# Patient Record
Sex: FEMALE | Race: WHITE | Employment: OTHER | URBAN - METROPOLITAN AREA
[De-identification: names, ages, dates, MRNs, and addresses within clinical notes are randomized per-mention and may not be internally consistent; named-entity substitution may affect disease eponyms.]

---

## 2017-04-21 ENCOUNTER — HOSPITAL ENCOUNTER (OUTPATIENT)
Dept: ENDOSCOPY | Age: 77
Discharge: HOME | End: 2017-04-21
Attending: INTERNAL MEDICINE | Admitting: INTERNAL MEDICINE

## 2017-11-27 ENCOUNTER — HOSPITAL ENCOUNTER (OUTPATIENT)
Dept: OTHER | Age: 77
Discharge: HOME | End: 2017-11-27
Attending: INTERNAL MEDICINE | Admitting: INTERNAL MEDICINE

## 2017-11-27 LAB
ALBUMIN SERPL-MCNC: 4 G/DL (ref 3.4–5)
ALP BLD-CCNC: 68 U/L (ref 40–129)
ALT SERPL-CCNC: 18 U/L (ref 10–40)
ANION GAP SERPL CALCULATED.3IONS-SCNC: 15 MMOL/L (ref 3–16)
AST SERPL-CCNC: 24 U/L (ref 15–37)
BILIRUB SERPL-MCNC: 0.4 MG/DL (ref 0–1)
BILIRUBIN DIRECT: <0.2 MG/DL (ref 0–0.3)
BILIRUBIN, INDIRECT: NORMAL MG/DL (ref 0–1)
BUN BLDV-MCNC: 14 MG/DL (ref 7–20)
CALCIUM SERPL-MCNC: 9.2 MG/DL (ref 8.3–10.6)
CHLORIDE BLD-SCNC: 103 MMOL/L (ref 99–110)
CHOLESTEROL, TOTAL: 212 MG/DL (ref 0–199)
CO2: 27 MMOL/L (ref 21–32)
CREAT SERPL-MCNC: 0.6 MG/DL (ref 0.6–1.2)
GFR AFRICAN AMERICAN: >60
GFR NON-AFRICAN AMERICAN: >60
GLUCOSE BLD-MCNC: 90 MG/DL (ref 70–99)
HCT VFR BLD CALC: 39.7 % (ref 36–48)
HDLC SERPL-MCNC: 51 MG/DL (ref 40–60)
HEMOGLOBIN: 13.4 G/DL (ref 12–16)
LDL CHOLESTEROL CALCULATED: 117 MG/DL
MCH RBC QN AUTO: 32 PG (ref 26–34)
MCHC RBC AUTO-ENTMCNC: 33.7 G/DL (ref 31–36)
MCV RBC AUTO: 94.8 FL (ref 80–100)
PDW BLD-RTO: 12.7 % (ref 12.4–15.4)
PLATELET # BLD: 190 K/UL (ref 135–450)
PMV BLD AUTO: 8.6 FL (ref 5–10.5)
POTASSIUM SERPL-SCNC: 4.1 MMOL/L (ref 3.5–5.1)
RBC # BLD: 4.19 M/UL (ref 4–5.2)
SODIUM BLD-SCNC: 145 MMOL/L (ref 136–145)
TOTAL PROTEIN: 7.1 G/DL (ref 6.4–8.2)
TRIGL SERPL-MCNC: 220 MG/DL (ref 0–150)
TSH SERPL DL<=0.05 MIU/L-ACNC: 3.63 UIU/ML (ref 0.27–4.2)
VLDLC SERPL CALC-MCNC: 44 MG/DL
WBC # BLD: 5.5 K/UL (ref 4–11)

## 2018-11-05 ENCOUNTER — OFFICE VISIT (OUTPATIENT)
Dept: ENDOCRINOLOGY | Age: 78
End: 2018-11-05
Payer: MEDICARE

## 2018-11-05 ENCOUNTER — TELEPHONE (OUTPATIENT)
Dept: ENDOCRINOLOGY | Age: 78
End: 2018-11-05

## 2018-11-05 VITALS
HEART RATE: 72 BPM | WEIGHT: 133 LBS | DIASTOLIC BLOOD PRESSURE: 82 MMHG | SYSTOLIC BLOOD PRESSURE: 130 MMHG | HEIGHT: 62 IN | BODY MASS INDEX: 24.48 KG/M2 | OXYGEN SATURATION: 98 %

## 2018-11-05 DIAGNOSIS — R79.89 ABNORMAL THYROID BLOOD TEST: ICD-10-CM

## 2018-11-05 DIAGNOSIS — M81.0 AGE-RELATED OSTEOPOROSIS WITHOUT CURRENT PATHOLOGICAL FRACTURE: Primary | ICD-10-CM

## 2018-11-05 DIAGNOSIS — R79.89 ABNORMAL TSH: ICD-10-CM

## 2018-11-05 DIAGNOSIS — E55.9 VITAMIN D DEFICIENCY: ICD-10-CM

## 2018-11-05 PROCEDURE — 99204 OFFICE O/P NEW MOD 45 MIN: CPT | Performed by: INTERNAL MEDICINE

## 2018-11-05 RX ORDER — ESTRADIOL 0.1 MG/G
2 CREAM VAGINAL
COMMUNITY

## 2018-11-05 NOTE — PATIENT INSTRUCTIONS
Patient Education        Preventing Osteoporosis: Care Instructions  Your Care Instructions    Osteoporosis means the bones are weak and thin enough that they can break easily. The older you are, the more likely you are to get osteoporosis. But with plenty of calcium, vitamin D, and exercise, you can help prevent osteoporosis. The preteen and teen years are a key time for bone building. With the help of calcium, vitamin D, and exercise in those early years and beyond, the bones reach their peak density and strength by age 27. After age 27, your bones naturally start to thin and weaken. The stronger your bones are at around age 27, the lower your risk for osteoporosis. But no matter what your age and risk are, your bones still need calcium, vitamin D, and exercise to stay strong. Also avoid smoking, and limit alcohol. Smoking and heavy alcohol use can make your bones thinner. Talk to your doctor about any special risks you might have, such as having a close relative with osteoporosis or taking a medicine that can weaken bones. Your doctor can tell you the best ways to protect your bones from thinning. Follow-up care is a key part of your treatment and safety. Be sure to make and go to all appointments, and call your doctor if you are having problems. It's also a good idea to know your test results and keep a list of the medicines you take. How can you care for yourself at home? · Get enough calcium and vitamin D. The [place] [employer] recommends adults younger than age 46 need 1,000 mg of calcium and 600 IU of vitamin D each day. Women ages 46 to 79 need 1,200 mg of calcium and 600 IU of vitamin D each day. Men ages 46 to 79 need 1,000 mg of calcium and 600 IU of vitamin D each day. Adults 71 and older need 1,200 mg of calcium and 800 IU of vitamin D each day. ¨ Eat foods rich in calcium, like yogurt, cheese, milk, and dark green vegetables.   ¨ Eat foods rich in vitamin D, like eggs, fatty fish, cereal, note the exact time of the final collection, even if it is not the same time as when collection began on day 1. STORAGE -- The bottle(s) may be kept at room temperature for a day or two, but should be kept cool or refrigerated for longer periods of time. WHERE TO GET MORE INFORMATION -- Your healthcare provider is the best source of information for questions and concerns related to your medical problem. Patient Education   Patient Education          denosumab (Prolia)  Pronunciation:  valente CHAVEZ natalya he  Brand:  Prolia  What is the most important information I should know about Prolia? This medication guide provides information about the Prolia brand of denosumab. [facility] is another brand of denosumab used to prevent bone fractures and other skeletal conditions in people with tumors that have spread to the bone. You should not receive denosumab if you have low levels of calcium in your blood (hypocalcemia). Prolia can harm an unborn baby or cause birth defects. Do not use if you are pregnant. What is denosumab (Prolia)? Denosumab is a monoclonal antibody. Monoclonal antibodies are made to target and destroy only certain cells in the body. This may help to protect healthy cells from damage. The Prolia brand of denosumab is used to treat osteoporosis in postmenopausal women who have high risk of bone fracture. Prolia is also used to increase bone mass in women and men with a high risk of bone fracture caused by receiving treatments for certain types of cancer. This medication guide provides information about the Prolia brand of denosumab. [facility] is another brand of denosumab used to prevent bone fractures and other skeletal conditions in people with tumors that have spread to the bone. Denosumab may also be used for purposes not listed in this medication guide. What should I discuss with my health care provider before receiving Prolia?   You should not receive Prolia if you are allergic to denosumab, or if you restrictions on food, beverages, or activity. What are the possible side effects of Prolia? Get emergency medical help if you have signs of an allergic reaction: hives, itching, rash; difficult breathing, feeling light-headed; swelling of your face, lips, tongue, or throat. Call your doctor at once if you have:  · new or unusual pain in your thigh, hip, or groin;  · severe pain in your joints, muscles, or bones;  · skin problems such as dryness, peeling, redness, itching, blisters, bumps, oozing, or crusting; or  · low levels of calcium in your blood (hypocalcemia) --numbness or tingly feeling around your mouth or in your fingers or toes, muscle tightness or contraction, overactive reflexes. Serious infections may occur during treatment with Prolia. Call your doctor right away if you have signs of infection such as:  · fever, chills, night sweats;  · swelling, pain, tenderness, warmth, or redness anywhere on your body;  · pain or burning when you urinate;  · increased or urgent need to urinate;  · severe stomach pain; or  · cough, feeling short of breath. Common side effects may include:  · bladder infection (painful or difficult urination);  · back pain, muscle pain; or  · pain in your arms or legs. This is not a complete list of side effects and others may occur. Call your doctor for medical advice about side effects. You may report side effects to FDA at 5-455-FDA-4706. What other drugs will affect Prolia? Other drugs may interact with denosumab, including prescription and over-the-counter medicines, vitamins, and herbal products. Tell each of your health care providers about all medicines you use now and any medicine you start or stop using. Where can I get more information? Your doctor or pharmacist can provide more information about denosumab (Prolia).     Remember, keep this and all other medicines out of the reach of children, never share your medicines with others, and use this medication only for begin taking alendronate);  · kidney disease; or  · any condition that makes it hard for your body to absorb nutrients from food (malabsorption). The effervescent tablet contains a lot of sodium. Tell your doctor if you are on a low-salt diet before using this form of alendronate. In rare cases, this medicine may cause bone loss (osteonecrosis) in the jaw. Symptoms include jaw pain or numbness, red or swollen gums, loose teeth, or slow healing after dental work. The longer you use alendronate, the more likely you are to develop this condition. Osteonecrosis of the jaw may be more likely if you have cancer or received chemotherapy, radiation, or steroids. Other risk factors include blood clotting disorders, anemia (low red blood cells), and a pre existing dental problem. Talk with your doctor about the risks and benefits of using this medication. It is not known whether this medicine will harm an unborn baby. Tell your doctor if you are pregnant or plan to become pregnant. It is not known whether alendronate passes into breast milk or if it could harm a nursing baby. Tell your doctor if you are breast-feeding a baby. How should I take alendronate? Alendronate is taken either once daily or once per week. Follow all directions on your prescription label. Do not take this medicine in larger or smaller amounts or for longer than recommended. Take alendronate first thing in the morning, at least 30 minutes before you eat or drink anything or take any other medicine. If you take alendronate only once per week, take it on the same day each week and always first thing in the morning. Take with a full glass (6 to 8 ounces) of plain water. Do not use coffee, tea, soda, juice, or mineral water. Do not eat or drink anything other than plain water. Measure liquid medicine  with the dosing syringe provided, or with a special dose-measuring spoon or medicine cup.  If you do not have a dose-measuring device, ask your

## 2018-11-12 ENCOUNTER — HOSPITAL ENCOUNTER (OUTPATIENT)
Age: 78
Discharge: HOME | End: 2018-11-12
Payer: MEDICARE

## 2018-11-12 DIAGNOSIS — M81.0 AGE-RELATED OSTEOPOROSIS WITHOUT CURRENT PATHOLOGICAL FRACTURE: ICD-10-CM

## 2018-11-12 DIAGNOSIS — E55.9 VITAMIN D DEFICIENCY: ICD-10-CM

## 2018-11-12 DIAGNOSIS — R79.89 ABNORMAL TSH: ICD-10-CM

## 2018-11-12 DIAGNOSIS — R79.89 ABNORMAL THYROID BLOOD TEST: ICD-10-CM

## 2018-11-12 LAB
24HR URINE VOLUME (ML): 1650 ML
A/G RATIO: 1.4 (ref 1.1–2.2)
ALBUMIN SERPL-MCNC: 4.3 G/DL (ref 3.4–5)
ALP BLD-CCNC: 71 U/L (ref 40–129)
ALT SERPL-CCNC: 18 U/L (ref 10–40)
ANION GAP SERPL CALCULATED.3IONS-SCNC: 12 MMOL/L (ref 3–16)
AST SERPL-CCNC: 25 U/L (ref 15–37)
BILIRUB SERPL-MCNC: <0.2 MG/DL (ref 0–1)
BUN BLDV-MCNC: 15 MG/DL (ref 7–20)
CALCIUM 24 HOUR URINE: 221 MG/24 HR (ref 42–353)
CALCIUM SERPL-MCNC: 9.7 MG/DL (ref 8.3–10.6)
CHLORIDE BLD-SCNC: 104 MMOL/L (ref 99–110)
CO2: 27 MMOL/L (ref 21–32)
CREAT SERPL-MCNC: 0.6 MG/DL (ref 0.6–1.2)
CREAT SERPL-MCNC: 0.6 MG/DL (ref 0.6–1.2)
CREATININE 24 HOUR URINE: 0.7 G/24HR (ref 0.6–1.5)
CREATININE CLEARANCE: 92 ML/MIN (ref 88–128)
GFR AFRICAN AMERICAN: >60
GFR NON-AFRICAN AMERICAN: >60
GLOBULIN: 3 G/DL
GLUCOSE BLD-MCNC: 97 MG/DL (ref 70–99)
Lab: 24 HR
PARATHYROID HORMONE INTACT: 28.5 PG/ML (ref 14–72)
POTASSIUM SERPL-SCNC: 4.1 MMOL/L (ref 3.5–5.1)
SODIUM BLD-SCNC: 143 MMOL/L (ref 136–145)
TOTAL PROTEIN: 7.3 G/DL (ref 6.4–8.2)
TSH SERPL DL<=0.05 MIU/L-ACNC: 2.31 UIU/ML (ref 0.27–4.2)
VITAMIN D 25-HYDROXY: 51.8 NG/ML

## 2018-11-12 PROCEDURE — 82523 COLLAGEN CROSSLINKS: CPT

## 2018-11-12 PROCEDURE — 83516 IMMUNOASSAY NONANTIBODY: CPT

## 2018-11-12 PROCEDURE — 82306 VITAMIN D 25 HYDROXY: CPT

## 2018-11-12 PROCEDURE — 84443 ASSAY THYROID STIM HORMONE: CPT

## 2018-11-12 PROCEDURE — 80053 COMPREHEN METABOLIC PANEL: CPT

## 2018-11-12 PROCEDURE — 82340 ASSAY OF CALCIUM IN URINE: CPT

## 2018-11-12 PROCEDURE — 82575 CREATININE CLEARANCE TEST: CPT

## 2018-11-12 PROCEDURE — 36415 COLL VENOUS BLD VENIPUNCTURE: CPT

## 2018-11-12 PROCEDURE — 83970 ASSAY OF PARATHORMONE: CPT

## 2018-11-13 ENCOUNTER — HOSPITAL ENCOUNTER (OUTPATIENT)
Age: 78
Discharge: HOME | End: 2018-11-13
Payer: MEDICARE

## 2018-11-13 PROCEDURE — 82523 COLLAGEN CROSSLINKS: CPT

## 2018-11-14 LAB
C TELOPEPTIDE: 168 PG/ML
CELIAC PANEL: 7 UNITS (ref 0–19)

## 2018-11-15 LAB
CREATININE URINE: 58 MG/DL
N-TELOPEPTIDE, CROSS-LINKED, URINE: 45

## 2018-12-26 ENCOUNTER — TELEPHONE (OUTPATIENT)
Dept: ENDOCRINOLOGY | Age: 78
End: 2018-12-26

## 2019-01-24 ENCOUNTER — TELEPHONE (OUTPATIENT)
Dept: ENDOCRINOLOGY | Age: 79
End: 2019-01-24

## 2019-02-05 ENCOUNTER — OFFICE VISIT (OUTPATIENT)
Dept: ENDOCRINOLOGY | Age: 79
End: 2019-02-05
Payer: MEDICARE

## 2019-02-05 VITALS
OXYGEN SATURATION: 99 % | HEART RATE: 78 BPM | HEIGHT: 62 IN | SYSTOLIC BLOOD PRESSURE: 146 MMHG | BODY MASS INDEX: 24.29 KG/M2 | DIASTOLIC BLOOD PRESSURE: 82 MMHG | WEIGHT: 132 LBS

## 2019-02-05 DIAGNOSIS — M81.0 AGE-RELATED OSTEOPOROSIS WITHOUT CURRENT PATHOLOGICAL FRACTURE: Primary | ICD-10-CM

## 2019-02-05 DIAGNOSIS — Z78.0 POSTMENOPAUSAL: ICD-10-CM

## 2019-02-05 PROCEDURE — 99214 OFFICE O/P EST MOD 30 MIN: CPT | Performed by: INTERNAL MEDICINE

## 2019-02-05 RX ORDER — ALENDRONATE SODIUM 70 MG/1
70 TABLET ORAL
Qty: 4 TABLET | Refills: 5 | Status: SHIPPED | OUTPATIENT
Start: 2019-02-05 | End: 2019-04-03 | Stop reason: ALTCHOICE

## 2019-03-19 ENCOUNTER — TELEPHONE (OUTPATIENT)
Dept: FAMILY MEDICINE CLINIC | Age: 79
End: 2019-03-19

## 2019-03-22 ENCOUNTER — TELEPHONE (OUTPATIENT)
Dept: FAMILY MEDICINE CLINIC | Age: 79
End: 2019-03-22

## 2019-04-03 ENCOUNTER — OFFICE VISIT (OUTPATIENT)
Dept: FAMILY MEDICINE CLINIC | Age: 79
End: 2019-04-03
Payer: MEDICARE

## 2019-04-03 VITALS
HEART RATE: 72 BPM | WEIGHT: 130 LBS | BODY MASS INDEX: 23.92 KG/M2 | OXYGEN SATURATION: 98 % | DIASTOLIC BLOOD PRESSURE: 88 MMHG | HEIGHT: 62 IN | SYSTOLIC BLOOD PRESSURE: 142 MMHG | TEMPERATURE: 98.4 F

## 2019-04-03 DIAGNOSIS — M81.0 AGE-RELATED OSTEOPOROSIS WITHOUT CURRENT PATHOLOGICAL FRACTURE: Primary | ICD-10-CM

## 2019-04-03 DIAGNOSIS — H61.22 IMPACTED CERUMEN, LEFT EAR: ICD-10-CM

## 2019-04-03 DIAGNOSIS — R03.0 ELEVATED BP WITHOUT DIAGNOSIS OF HYPERTENSION: ICD-10-CM

## 2019-04-03 PROCEDURE — 99202 OFFICE O/P NEW SF 15 MIN: CPT | Performed by: FAMILY MEDICINE

## 2019-04-03 RX ORDER — TURMERIC ROOT EXTRACT 500 MG
1 TABLET ORAL 2 TIMES DAILY
COMMUNITY
End: 2021-04-23

## 2019-04-03 ASSESSMENT — PATIENT HEALTH QUESTIONNAIRE - PHQ9
1. LITTLE INTEREST OR PLEASURE IN DOING THINGS: 0
SUM OF ALL RESPONSES TO PHQ QUESTIONS 1-9: 0
SUM OF ALL RESPONSES TO PHQ QUESTIONS 1-9: 0
2. FEELING DOWN, DEPRESSED OR HOPELESS: 0
SUM OF ALL RESPONSES TO PHQ9 QUESTIONS 1 & 2: 0

## 2019-04-03 NOTE — PATIENT INSTRUCTIONS
Patient Education        Deciding About Bisphosphonate Medicine for Osteoporosis  How can you decide about taking bisphosphonate medicine for osteoporosis? What is osteoporosis? Osteoporosis is a disease that affects your bones. It means you have bones that are thin and brittle and have lots of holes inside them like a sponge. This makes them easy to break. It also increases your risk for spine and hip fractures. These fractures may make it hard for you to live on your own. Bisphosphonates are the most common medicines used to prevent bone loss. Most of the time, you take them as pills. They slow the way bone dissolves and is absorbed by your body. They can increase bone thickness and strength. What are key points about this decision? · If you have osteoporosis, these medicines can increase bone thickness. And they can lower your risk of spine and hip fractures. You may also want to think about taking them if you have low bone density (sometimes called osteopenia) or risk factors for osteoporosis. · These medicines can have side effects, such as heartburn and belly pain. They also can cause headaches. Their long-term effects are not known. · Whether you take medicine or not, healthy habits can also help protect your bones. Talk to your doctor about taking calcium and vitamin D supplements. Get regular weight-bearing exercise. Cut back on alcohol. And if you smoke, quit. Why might you choose to take bisphosphonate medicines? · You have bone loss that is not normal for your age. · You have osteoporosis or low bone density. Or you have risk factors for osteoporosis. · You have been taking hormone therapy (HT) and stopped. Bone loss medicines can protect against rapid bone loss after you quit HT. · You do not mind taking pills. Or you do not mind getting medicines through a tube in your vein, called an IV. · You think the benefits of taking these medicines outweigh the side effects.   Why might you choose not to take these medicines? · You want to try other medicines that help prevent bone loss. These include calcitonin (Calcimar or Miacalcin), denosumab (Prolia or Xgeva), hormone therapy, raloxifene (Evista), and teriparatide (Forteo). · You want to try healthy habits to prevent bone fractures. · You do not like the idea of taking pills. Or you do not like getting medicines through a tube in your vein, called an IV. · You are worried about the side effects of these medicines. Your decision  Thinking about the facts and your feelings can help you make a decision that is right for you. Be sure you understand the benefits and risks of your options, and think about what else you need to do before you make the decision. Where can you learn more? Go to [email or web address] org and sign in to your LifeBook account. Enter Z205 in the Appear Here box to learn more about \"Deciding About Bisphosphonate Medicine for Osteoporosis. \"     If you do not have an account, please click on the \"Sign Up Now\" link. Current as of: March 15, 2018  Content Version: 11.9  © 8115-3026 [employer]. Care instructions adapted under license by [facility] ([facility]). If you have questions about a medical condition or this instruction, always ask your healthcare professional. [address] any warranty or liability for your use of this information.

## 2019-04-03 NOTE — PROGRESS NOTES
Maya Genao is a 77 y.o. female here to establish care. The patient has had a primary care physician in the recent past, Dr. Harini Leo .  is a patient here  Hx osteoporosis, did not tolerate fosamax, won't take prolia    HPI:  W[facility] to [facility]   comes here    postmenopausal osteoporosis- does not like treatment options, did not tolerate Fosamax and will not take Prolia      ROS:  Gen:  Denies fever, chills, unintentional weight loss. HEENT:  Denies cold symptoms, sore throat. CV:  Denies chest pain or tightness, palpitations. Pulm:  Denies shortness of breath, cough. Abd:  Denies abdominal pain, change in bowel habits, rectal bleeding, nausea and vomiting. I have reviewed the patient's medical history in detail and updated the computerized patient record. OBJECTIVE:  BP (!) 142/88 (Site: Left Upper Arm, Position: Sitting, Cuff Size: Medium Adult)   Pulse 72   Temp 98.4 °F (36.9 °C) (Tympanic)   Ht 5' 1.81\" (1.57 m)   Wt 130 lb (59 kg)   SpO2 98%   BMI 23.92 kg/m²   GEN:  in NAD, thin  white female  HEENT:  NCAT, TMs and THROAT: clear, PERRL, EOMI. NECK:  Supple without adenopathy. CV:  Regular rate and rhythm, S1 and S2 normal, no murmurs, clicks, gallops or rubs. No edema. PULM:  Chest is clear, no wheezing or rales. Normal symmetric air entry throughout both lung fields. ABD: Soft, NTND, normal bowel sounds, no masses. EXT: no rash or edema  NEURO: nonfocal    ASSESSMENT:  1. Age-related osteoporosis without current pathological fracture    2. Elevated BP without diagnosis of hypertension    3.  Impacted cerumen, left ear        PLAN:  Release of records from previous physician  Debrox drops and come back to irrigate left ear  Check  blood pressure at home and report back to us  Consider lower dose of Fosamax or Actonel perhaps she can tolerate that better   Reviewed office policies with the patient   Will review prior records when available

## 2019-04-10 ENCOUNTER — OFFICE VISIT (OUTPATIENT)
Dept: FAMILY MEDICINE CLINIC | Age: 79
End: 2019-04-10
Payer: MEDICARE

## 2019-04-10 VITALS
WEIGHT: 129 LBS | BODY MASS INDEX: 23.74 KG/M2 | DIASTOLIC BLOOD PRESSURE: 70 MMHG | HEART RATE: 75 BPM | OXYGEN SATURATION: 97 % | HEIGHT: 62 IN | SYSTOLIC BLOOD PRESSURE: 144 MMHG

## 2019-04-10 DIAGNOSIS — R03.0 ELEVATED SYSTOLIC BLOOD PRESSURE READING WITHOUT DIAGNOSIS OF HYPERTENSION: Primary | ICD-10-CM

## 2019-04-10 DIAGNOSIS — H61.22 IMPACTED CERUMEN OF LEFT EAR: ICD-10-CM

## 2019-04-10 PROCEDURE — 99213 OFFICE O/P EST LOW 20 MIN: CPT | Performed by: FAMILY MEDICINE

## 2019-04-10 RX ORDER — LOSARTAN POTASSIUM 25 MG/1
25 TABLET ORAL DAILY
Qty: 30 TABLET | Refills: 5 | Status: SHIPPED | OUTPATIENT
Start: 2019-04-10 | End: 2019-12-20

## 2019-04-10 NOTE — PATIENT INSTRUCTIONS
Patient Education        losartan  Pronunciation:  deion KAREN tan  Brand:  Cozaar  What is the most important information I should know about losartan? Do not use if you are pregnant. If you become pregnant, stop taking this medicine and tell your doctor right away. Losartan can cause injury or death to the unborn baby if you take the medicine during your second or third trimester. If you have diabetes, do not use losartan together with any medication that contains aliskiren (Amturnide, Tekturna, Tekamlo). What is losartan? Losartan is an angiotensin II receptor antagonist. Losartan keeps blood vessels from narrowing, which lowers blood pressure and improves blood flow. Losartan is used to treat high blood pressure (hypertension) in adults and children who are at least 10years old. It is also used to lower the risk of stroke in certain people with heart disease. Losartan is used to slow long-term kidney damage in people with type 2 diabetes who also have high blood pressure. Losartan may also be used for purposes not listed in this medication guide. What should I discuss with my healthcare provider before taking losartan? You should not use losartan if you are allergic to it. If you have diabetes, do not use losartan together with any medication that contains aliskiren (A[place]ide, Tekturna, Tekamlo). You may also need to avoid taking losartan with aliskiren if you have kidney disease. Do not use if you are pregnant. If you become pregnant, stop taking this medicine and tell your doctor right away. Losartan can cause injury or death to the unborn baby if you take the medicine during your second or third trimester. To make sure losartan is safe for you, tell your doctor if you have:  · kidney disease;  · liver disease;  · congestive heart failure;  · an electrolyte imbalance (such as high levels of potassium in your blood);  · if you are on a low-salt diet; or  · if you are dehydrated.   It is not known whether losartan passes into breast milk or if it could harm a nursing baby. You should not breast-feed while using this medicine. Losartan is not approved for use by anyone younger than 10years old. How should I take losartan? Follow all directions on your prescription label. Your doctor may occasionally change your dose to make sure you get the best results. Do not take this medicine in larger or smaller amounts or for longer than recommended. You may take losartan with or without food. Call your doctor if you have ongoing vomiting or diarrhea, or if you are sweating more than usual. You can easily become dehydrated while taking this medication, which can lead to severely low blood pressure or a serious electrolyte imbalance. Your blood pressure will need to be checked often. You may need other blood and urine tests at your doctor's office. It may take 3 to 6 weeks of using this medicine before your blood pressure is under control. For best results, keep using the medication as directed. Talk with your doctor if your condition does not improve after 3 weeks of treatment. If you are being treated for high blood pressure, keep using this medication even if you feel well. High blood pressure often has no symptoms. You may need to use blood pressure medication for the rest of your life. Store at room temperature away from moisture, heat, and light. What happens if I miss a dose? Take the missed dose as soon as you remember. Skip the missed dose if it is almost time for your next scheduled dose. Do not  take extra medicine to make up the missed dose. What happens if I overdose? Seek emergency medical attention or call the Poison Help line at 1-103.496.7965. What should I avoid while taking losartan? Drinking alcohol can further lower your blood pressure and may increase certain side effects of losartan.   Do not use potassium supplements or salt substitutes while you are taking losartan, unless your doctor has told you to. Avoid getting up too fast from a sitting or lying position, or you may feel dizzy. Get up slowly and steady yourself to prevent a fall. What are the possible side effects of losartan? Get emergency medical help if you have signs of an allergic reaction: hives; difficult breathing; swelling of your face, lips, tongue, or throat. Call your doctor at once if you have:  · a light-headed feeling, like you might pass out;  · pain or burning when you urinate;  · pale skin, feeling light-headed or short of breath, rapid heart rate, trouble concentrating;  · chest pain;  · high potassium --nausea, slow or unusual heart rate, weakness, loss of movement; or  · kidney problems --little or no urination, painful or difficult urination, swelling in your feet or ankles, feeling tired or short of breath. Common side effects may include:  · dizziness;  · dry cough;  · back pain; or  · cold symptoms such as stuffy nose, sneezing, sore throat. This is not a complete list of side effects and others may occur. Call your doctor for medical advice about side effects. You may report side effects to FDA at 3-734-FDA-5929. What other drugs will affect losartan? Tell your doctor about all your current medicines and any you start or stop using, especially:  · a diuretic or \"water pill\";  · other blood pressure medications;  · lithium;  · celecoxib; or  · aspirin or other NSAIDs (nonsteroidal anti-inflammatory drugs) such as ibuprofen (Advil, Motrin), naproxen (Aleve), celecoxib, diclofenac, indomethacin, meloxicam, and others. This list is not complete. Other drugs may interact with losartan, including prescription and over-the-counter medicines, vitamins, and herbal products. Not all possible interactions are listed in this medication guide. Where can I get more information? Your pharmacist can provide more information about losartan.   Remember, keep this and all other medicines out of the reach of children, never share your medicines with others, and use this medication only for the indication prescribed. Every effort has been made to ensure that the information provided by [address] is accurate, up-to-date, and complete, but no guarantee is made to that effect. Drug information contained herein may be time sensitive. [facility] information has been compiled for use by healthcare practitioners and consumers in the [place] and therefore [facility] does not warrant that uses outside of the [place] are appropriate, unless specifically indicated otherwise. [facility]'s drug information does not endorse drugs, diagnose patients or recommend therapy. [facility]'s drug information is an informational resource designed to assist licensed healthcare practitioners in caring for their patients and/or to serve consumers viewing this service as a supplement to, and not a substitute for, the expertise, skill, knowledge and judgment of healthcare practitioners. The absence of a warning for a given drug or drug combination in no way should be construed to indicate that the drug or drug combination is safe, effective or appropriate for any given patient. [facility] does not assume any responsibility for any aspect of healthcare administered with the aid of information [facility] provides. The information contained herein is not intended to cover all possible uses, directions, precautions, warnings, drug interactions, allergic reactions, or adverse effects. If you have questions about the drugs you are taking, check with your doctor, nurse or pharmacist.  Copyright 2028-3006 [address]. Version: 14.01. Revision date: 5/6/2016. Care instructions adapted under license by [facility] ([facility]). If you have questions about a medical condition or this instruction, always ask your healthcare professional. [place] [address] any warranty or liability for your use of this information.

## 2019-04-10 NOTE — PROGRESS NOTES
Xochitl Ramos is a 77 y.o. female. HPI:  For blood pressure check and left ear lavage/wax  Been using drip Debrox drops  Patient states blood pressure runs high at home occasionally, stomach runs in the 140s at times  Other times it is okay in the 130s, but never consistently normal  Discussed risk of hypertension and importance of controlling blood pressure  Meds, vitamins and allergies reviewed with pt  Wt Readings from Last 3 Encounters:   04/10/19 129 lb (58.5 kg)   04/03/19 130 lb (59 kg)   02/05/19 132 lb (59.9 kg)       REVIEW OF SYSTEMS:   CONSTITUTIONAL: See history of present illness,   Weight noted   HEENT: No new vision difficulties or ringing in the ears. RESPIRATORY: No new SOB, PND, orthopnea or cough. CARDIOVASCULAR: no CP, palpitations or SOB with exertion  GI: No nausea, vomiting, diarrhea, constipation, abdominal pain or changes in bowel habits. : No urinary frequency, urgency, incontinence hematuria or dysuria. SKIN: No cyanosis or skin lesions. MUSCULOSKELETAL: No new muscle or joint pain. NEUROLOGICAL: No syncope or TIA-like symptoms. PSYCHIATRIC: No anxiety, insomnia or depression     No Known Allergies    Prior to Visit Medications    Medication Sig Taking?  Authorizing Provider   losartan (COZAAR) 25 MG tablet Take 1 tablet by mouth daily Yes Michael Hester MD   Turmeric 500 MG TABS Take 1 tablet by mouth daily Yes Historical Provider, MD   carbamide peroxide (DEBROX) 6.5 % otic solution Place 5 drops into the left ear 2 times daily Yes Michael Hester MD   GLUCOSAMINE-CHONDROITIN PO Take 1 tablet by mouth 2 times daily  Yes Historical Provider, MD   Calcium Carb-Cholecalciferol (CALTRATE 600+D3 SOFT PO) Take by mouth 2 times daily  Yes Historical Provider, MD   estradiol (ESTRACE VAGINAL) 0.1 MG/GM vaginal cream Place 2 g vaginally Twice a Week Yes Historical Provider, MD       Past Medical History:   Diagnosis Date    Age-related osteoporosis without current pathological fracture 11/5/2018    Osteoporosis        Social History     Tobacco Use    Smoking status: Never Smoker    Smokeless tobacco: Never Used   Substance Use Topics    Alcohol use: No       Family History   Problem Relation Age of Onset    Cancer Mother     Lung Cancer Father        OBJECTIVE:  BP (!) 144/70   Pulse 75   Ht 5' 1.81\" (1.57 m)   Wt 129 lb (58.5 kg)   SpO2 97%   BMI 23.74 kg/m²   GEN:  in NAD  HEENT:  NCAT, TMs:normal and throat: clear  NECK:  Supple without adenopathy. CV:  Regular rate and rhythm, S1 and S2 normal, no murmurs, clicks  PULM:  Chest is clear, no wheezing ,  symmetric air entry throughout both lung fields. ABD: Soft, NT  EXT: No rash or edema  NEURO: Alert oriented ×3    ASSESSMENT/PLAN:  1. Elevated systolic blood pressure reading without diagnosis of hypertension  Recommend low-dose losartan, she will think about it, I have given her information about the medication to read  - losartan (COZAAR) 25 MG tablet; Take 1 tablet by mouth daily  Dispense: 30 tablet; Refill: 5    2.  Impacted cerumen of left ear  Lavaged easily by MA  - Ear wax removal    Healthy diet, regular exercise, recheck blood pressure one month if she decides to take the medication otherwise can follow-up every 4-6 months

## 2019-10-22 ENCOUNTER — APPOINTMENT (RX ONLY)
Dept: URBAN - METROPOLITAN AREA CLINIC 170 | Facility: CLINIC | Age: 79
Setting detail: DERMATOLOGY
End: 2019-10-22

## 2019-10-22 DIAGNOSIS — L81.4 OTHER MELANIN HYPERPIGMENTATION: ICD-10-CM

## 2019-10-22 DIAGNOSIS — D22 MELANOCYTIC NEVI: ICD-10-CM

## 2019-10-22 DIAGNOSIS — D18.0 HEMANGIOMA: ICD-10-CM

## 2019-10-22 DIAGNOSIS — L82.1 OTHER SEBORRHEIC KERATOSIS: ICD-10-CM

## 2019-10-22 DIAGNOSIS — Z85.820 PERSONAL HISTORY OF MALIGNANT MELANOMA OF SKIN: ICD-10-CM

## 2019-10-22 PROBLEM — D18.01 HEMANGIOMA OF SKIN AND SUBCUTANEOUS TISSUE: Status: ACTIVE | Noted: 2019-10-22

## 2019-10-22 PROBLEM — D22.5 MELANOCYTIC NEVI OF TRUNK: Status: ACTIVE | Noted: 2019-10-22

## 2019-10-22 PROBLEM — M12.9 ARTHROPATHY, UNSPECIFIED: Status: ACTIVE | Noted: 2019-10-22

## 2019-10-22 PROCEDURE — 99202 OFFICE O/P NEW SF 15 MIN: CPT

## 2019-10-22 PROCEDURE — ? COUNSELING

## 2019-10-22 PROCEDURE — ? SUNSCREEN RECOMMENDATIONS

## 2019-10-22 PROCEDURE — ? FULL BODY SKIN EXAM

## 2019-10-22 ASSESSMENT — LOCATION SIMPLE DESCRIPTION DERM
LOCATION SIMPLE: RIGHT LOWER BACK
LOCATION SIMPLE: ABDOMEN
LOCATION SIMPLE: RIGHT UPPER BACK

## 2019-10-22 ASSESSMENT — LOCATION ZONE DERM: LOCATION ZONE: TRUNK

## 2019-10-22 ASSESSMENT — LOCATION DETAILED DESCRIPTION DERM
LOCATION DETAILED: EPIGASTRIC SKIN
LOCATION DETAILED: RIGHT INFERIOR MEDIAL MIDBACK
LOCATION DETAILED: RIGHT INFERIOR MEDIAL UPPER BACK
LOCATION DETAILED: SUBXIPHOID

## 2019-10-22 NOTE — PROCEDURE: MIPS QUALITY
Quality 431: Preventive Care And Screening: Unhealthy Alcohol Use - Screening: Patient screened for unhealthy alcohol use using a single question and scores less than 2 times per year
Quality 110: Preventive Care And Screening: Influenza Immunization: Influenza Immunization Administered during Influenza season
Quality 474: Zoster Vaccination Status: Shingrix Vaccination Administered or Previously Received
Quality 111:Pneumonia Vaccination Status For Older Adults: Pneumococcal Vaccination Previously Received
Quality 226: Preventive Care And Screening: Tobacco Use: Screening And Cessation Intervention: Patient screened for tobacco use and is an ex/non-smoker
Detail Level: Detailed
Quality 130: Documentation Of Current Medications In The Medical Record: Current Medications with Name, Dosage, Frequency, or Route not Documented, Reason not Given

## 2019-10-22 NOTE — HPI: EVALUATION OF SKIN LESION(S)
What Type Of Note Output Would You Prefer (Optional)?: Bullet Format
Hpi Title: Evaluation of Skin Lesions
How Severe Are Your Spot(S)?: mild
Have Your Spot(S) Been Treated In The Past?: has not been treated
Family Member: Mom
Location: Right arm
Year Removed: 2006

## 2019-12-20 ENCOUNTER — OFFICE VISIT (OUTPATIENT)
Dept: FAMILY MEDICINE CLINIC | Age: 79
End: 2019-12-20
Payer: MEDICARE

## 2019-12-20 VITALS
DIASTOLIC BLOOD PRESSURE: 84 MMHG | HEART RATE: 81 BPM | BODY MASS INDEX: 23.41 KG/M2 | HEIGHT: 61 IN | SYSTOLIC BLOOD PRESSURE: 168 MMHG | OXYGEN SATURATION: 98 % | WEIGHT: 124 LBS

## 2019-12-20 DIAGNOSIS — R03.0 ELEVATED BP WITHOUT DIAGNOSIS OF HYPERTENSION: Primary | ICD-10-CM

## 2019-12-20 DIAGNOSIS — M81.0 AGE-RELATED OSTEOPOROSIS WITHOUT CURRENT PATHOLOGICAL FRACTURE: ICD-10-CM

## 2019-12-20 PROBLEM — I10 ESSENTIAL HYPERTENSION: Status: ACTIVE | Noted: 2019-12-20

## 2019-12-20 PROCEDURE — 99213 OFFICE O/P EST LOW 20 MIN: CPT | Performed by: FAMILY MEDICINE

## 2019-12-20 RX ORDER — ALENDRONATE SODIUM 10 MG/1
10 TABLET ORAL
COMMUNITY
Start: 2019-11-18 | End: 2021-10-26

## 2019-12-21 ENCOUNTER — TELEPHONE (OUTPATIENT)
Dept: FAMILY MEDICINE CLINIC | Age: 79
End: 2019-12-21

## 2019-12-30 ENCOUNTER — TELEPHONE (OUTPATIENT)
Dept: FAMILY MEDICINE CLINIC | Age: 79
End: 2019-12-30

## 2020-01-24 ENCOUNTER — TELEPHONE (OUTPATIENT)
Dept: ADMINISTRATIVE | Age: 80
End: 2020-01-24

## 2020-01-27 DIAGNOSIS — M81.0 AGE-RELATED OSTEOPOROSIS WITHOUT CURRENT PATHOLOGICAL FRACTURE: ICD-10-CM

## 2020-01-27 DIAGNOSIS — R03.0 ELEVATED BP WITHOUT DIAGNOSIS OF HYPERTENSION: ICD-10-CM

## 2020-01-27 DIAGNOSIS — R53.83 FATIGUE, UNSPECIFIED TYPE: ICD-10-CM

## 2020-01-27 LAB
A/G RATIO: 1.9 (ref 1.1–2.2)
ALBUMIN SERPL-MCNC: 4.2 G/DL (ref 3.4–5)
ALP BLD-CCNC: 67 U/L (ref 40–129)
ALT SERPL-CCNC: 14 U/L (ref 10–40)
ANION GAP SERPL CALCULATED.3IONS-SCNC: 14 MMOL/L (ref 3–16)
AST SERPL-CCNC: 24 U/L (ref 15–37)
BASOPHILS ABSOLUTE: 0 K/UL (ref 0–0.2)
BASOPHILS RELATIVE PERCENT: 0.8 %
BILIRUB SERPL-MCNC: 0.4 MG/DL (ref 0–1)
BUN BLDV-MCNC: 9 MG/DL (ref 7–20)
CALCIUM SERPL-MCNC: 9 MG/DL (ref 8.3–10.6)
CHLORIDE BLD-SCNC: 102 MMOL/L (ref 99–110)
CHOLESTEROL, TOTAL: 184 MG/DL (ref 0–199)
CO2: 24 MMOL/L (ref 21–32)
CREAT SERPL-MCNC: 0.6 MG/DL (ref 0.6–1.2)
EOSINOPHILS ABSOLUTE: 0.2 K/UL (ref 0–0.6)
EOSINOPHILS RELATIVE PERCENT: 2.6 %
ESTIMATED AVERAGE GLUCOSE: 102.5 MG/DL
GFR AFRICAN AMERICAN: >60
GFR NON-AFRICAN AMERICAN: >60
GLOBULIN: 2.2 G/DL
GLUCOSE BLD-MCNC: 86 MG/DL (ref 70–99)
HBA1C MFR BLD: 5.2 %
HCT VFR BLD CALC: 38.9 % (ref 36–48)
HDLC SERPL-MCNC: 51 MG/DL (ref 40–60)
HEMOGLOBIN: 13 G/DL (ref 12–16)
LDL CHOLESTEROL CALCULATED: 96 MG/DL
LYMPHOCYTES ABSOLUTE: 1.5 K/UL (ref 1–5.1)
LYMPHOCYTES RELATIVE PERCENT: 25.4 %
MCH RBC QN AUTO: 32.1 PG (ref 26–34)
MCHC RBC AUTO-ENTMCNC: 33.4 G/DL (ref 31–36)
MCV RBC AUTO: 96.2 FL (ref 80–100)
MONOCYTES ABSOLUTE: 0.4 K/UL (ref 0–1.3)
MONOCYTES RELATIVE PERCENT: 6.2 %
NEUTROPHILS ABSOLUTE: 3.9 K/UL (ref 1.7–7.7)
NEUTROPHILS RELATIVE PERCENT: 65 %
PDW BLD-RTO: 12.9 % (ref 12.4–15.4)
PLATELET # BLD: 222 K/UL (ref 135–450)
PMV BLD AUTO: 8.6 FL (ref 5–10.5)
POTASSIUM SERPL-SCNC: 4 MMOL/L (ref 3.5–5.1)
RBC # BLD: 4.05 M/UL (ref 4–5.2)
SODIUM BLD-SCNC: 140 MMOL/L (ref 136–145)
TOTAL PROTEIN: 6.4 G/DL (ref 6.4–8.2)
TRIGL SERPL-MCNC: 187 MG/DL (ref 0–150)
TSH REFLEX: 3.64 UIU/ML (ref 0.27–4.2)
VLDLC SERPL CALC-MCNC: 37 MG/DL
WBC # BLD: 6 K/UL (ref 4–11)

## 2020-01-28 ENCOUNTER — TELEPHONE (OUTPATIENT)
Dept: FAMILY MEDICINE CLINIC | Age: 80
End: 2020-01-28

## 2020-02-27 ENCOUNTER — TELEPHONE (OUTPATIENT)
Dept: FAMILY MEDICINE CLINIC | Age: 80
End: 2020-02-27

## 2020-03-13 ENCOUNTER — HOSPITAL ENCOUNTER (OUTPATIENT)
Dept: NON INVASIVE DIAGNOSTICS | Age: 80
Discharge: HOME | End: 2020-03-13
Payer: MEDICARE

## 2020-03-13 LAB
LV EF: 58 %
LVEF MODALITY: NORMAL

## 2020-03-13 PROCEDURE — 93306 TTE W/DOPPLER COMPLETE: CPT

## 2020-03-17 ENCOUNTER — TELEPHONE (OUTPATIENT)
Dept: FAMILY MEDICINE CLINIC | Age: 80
End: 2020-03-17

## 2020-03-17 NOTE — TELEPHONE ENCOUNTER
Called the patient with results of Doppler  Would a copy sent to her home  Printed copy given to [facility] to mail

## 2020-11-02 ENCOUNTER — TELEPHONE (OUTPATIENT)
Dept: FAMILY MEDICINE CLINIC | Age: 80
End: 2020-11-02

## 2020-11-02 ENCOUNTER — OFFICE VISIT (OUTPATIENT)
Dept: FAMILY MEDICINE CLINIC | Age: 80
End: 2020-11-02
Payer: MEDICARE

## 2020-11-02 ENCOUNTER — NURSE TRIAGE (OUTPATIENT)
Dept: OTHER | Facility: CLINIC | Age: 80
End: 2020-11-02

## 2020-11-02 VITALS
BODY MASS INDEX: 22.63 KG/M2 | OXYGEN SATURATION: 98 % | HEART RATE: 78 BPM | DIASTOLIC BLOOD PRESSURE: 76 MMHG | SYSTOLIC BLOOD PRESSURE: 116 MMHG | HEIGHT: 62 IN | WEIGHT: 123 LBS | TEMPERATURE: 98 F

## 2020-11-02 PROCEDURE — 69210 REMOVE IMPACTED EAR WAX UNI: CPT | Performed by: FAMILY MEDICINE

## 2020-11-02 PROCEDURE — 99213 OFFICE O/P EST LOW 20 MIN: CPT | Performed by: FAMILY MEDICINE

## 2020-11-02 PROCEDURE — 3288F FALL RISK ASSESSMENT DOCD: CPT | Performed by: FAMILY MEDICINE

## 2020-11-02 PROCEDURE — G8510 SCR DEP NEG, NO PLAN REQD: HCPCS | Performed by: FAMILY MEDICINE

## 2020-11-02 ASSESSMENT — PATIENT HEALTH QUESTIONNAIRE - PHQ9
2. FEELING DOWN, DEPRESSED OR HOPELESS: 0
SUM OF ALL RESPONSES TO PHQ9 QUESTIONS 1 & 2: 0
SUM OF ALL RESPONSES TO PHQ QUESTIONS 1-9: 0
1. LITTLE INTEREST OR PLEASURE IN DOING THINGS: 0
SUM OF ALL RESPONSES TO PHQ QUESTIONS 1-9: 0
SUM OF ALL RESPONSES TO PHQ QUESTIONS 1-9: 0

## 2020-11-02 NOTE — TELEPHONE ENCOUNTER
Pt was transferred from nurse triage. Muffled hearing in R ear. No openings with any provider for today.   Please find out when/where pt can be placed on today's schedule and call pt back

## 2020-11-02 NOTE — TELEPHONE ENCOUNTER
Reason for Disposition   Patient wants to be seen    Answer Assessment - Initial Assessment Questions  1. DESCRIPTION: \"What type of hearing problem are you having? Describe it for me. \" (e.g., complete hearing loss, partial loss)      Muffled hearing right ear    2. LOCATION: \"One or both ears? \" If one, ask: \"Which ear? \"      Right ear    3. SEVERITY: \"Can you hear anything? \" If so, ask: \"What can you hear? \" (e.g., ticking watch, whisper, talking)      Improving, slightly muffled    4. ONSET: \"When did this begin? \" \"Did it start suddenly or come on gradually? \"      Last week    5. PATTERN: \"Does this come and go, or has it been constant since it started? \"      Comes and goes    6. PAIN: \"Is there any pain in your ear(s)? \"  (Scale 1-10; or mild, moderate, severe)      Denies    7. CAUSE: \"What do you think is causing this hearing problem? \"      Wax buildup    8. OTHER SYMPTOMS: \"Do you have any other symptoms? \" (e.g., dizziness, ringing in ears)      Denies    9. PREGNANCY: \"Is there any chance you are pregnant? \" \"When was your last menstrual period? \"      n/a    Protocols used: HEARING LOSS OR CHANGE-ADULT-[place]

## 2020-11-02 NOTE — PROGRESS NOTES
Lukas Davey is a 79 y.o. female. HPI:  Here to have right ear checked/she tends to be a wax former  Has been using softening drops in her right ear canal only    Meds, vitamins and allergies reviewed with pt    Wt Readings from Last 3 Encounters:   11/02/20 123 lb (55.8 kg)   12/20/19 124 lb (56.2 kg)   04/10/19 129 lb (58.5 kg)       REVIEW OF SYSTEMS:   CONSTITUTIONAL: See history of present illness,   Weight noted   HEENT: No new vision difficulties , earwax right ear canal  RESPIRATORY: No new SOB, PND, orthopnea or cough. CARDIOVASCULAR: no CP, palpitations or SOB with exertion  GI: No nausea, vomiting, diarrhea, constipation, abdominal pain or changes in bowel habits. : No urinary frequency, urgency, incontinence hematuria or dysuria. SKIN: No cyanosis or skin lesions. MUSCULOSKELETAL: No new muscle or joint pain. NEUROLOGICAL: No syncope or TIA-like symptoms. PSYCHIATRIC: No anxiety, insomnia or depression     No Known Allergies    Prior to Visit Medications    Medication Sig Taking?  Authorizing Provider   alendronate (FOSAMAX) 10 MG tablet Take 10 mg by mouth Yes Historical Provider, MD   calcium citrate-vitamin D (CITRICAL + D) 315-250 MG-UNIT TABS per tablet Take 1 tablet by mouth 2 times daily (with meals) Yes Historical Provider, MD   Turmeric 500 MG TABS Take 1 tablet by mouth 2 times daily  Yes Historical Provider, MD   estradiol (ESTRACE VAGINAL) 0.1 MG/GM vaginal cream Place 2 g vaginally Twice a Week Yes Historical Provider, MD       Past Medical History:   Diagnosis Date    Age-related osteoporosis without current pathological fracture 11/5/2018    Osteoporosis        Social History     Tobacco Use    Smoking status: Never Smoker    Smokeless tobacco: Never Used   Substance Use Topics    Alcohol use: No       Family History   Problem Relation Age of Onset    Cancer Mother     Lung Cancer Father     Coronary Art Dis Brother        OBJECTIVE:  /76   Pulse 78   Temp 98 °F (36.7 °C)   Ht 5' 2\" (1.575 m)   Wt 123 lb (55.8 kg)   SpO2 98%   BMI 22.50 kg/m²   GEN:  in NAD  HEENT:  NCAT, TMs: Left TM is normal, right TM with wax obstructing and throat: Not examined due to Covid/mask on  Wax was easily removed with a curette Dr. Sanford Blank:  Supple without adenopathy. ABD: Soft, NT  EXT: No rash or edema  NEURO: Alert oriented ×3, nonfocal     ASSESSMENT/PLAN:  1. Impacted cerumen of right ear  Wax easily removed remove with curette  2.   Menopausal  Also due for DEXA scan/ordered    Pneumonia 23 in the future through her pharmacy

## 2020-11-04 ENCOUNTER — TELEPHONE (OUTPATIENT)
Dept: FAMILY MEDICINE CLINIC | Age: 80
End: 2020-11-04

## 2020-12-03 ENCOUNTER — TELEPHONE (OUTPATIENT)
Dept: FAMILY MEDICINE CLINIC | Age: 80
End: 2020-12-03

## 2020-12-03 NOTE — TELEPHONE ENCOUNTER
Have her call [address]     [phone] , they now have offices all over the [facility]

## 2020-12-29 ENCOUNTER — TELEPHONE (OUTPATIENT)
Dept: FAMILY MEDICINE CLINIC | Age: 80
End: 2020-12-29

## 2020-12-29 NOTE — TELEPHONE ENCOUNTER
Pt would like to know if she can take 3 400mg tablets of citracal per day instead of the prior plan    Please call pt back and advise

## 2020-12-29 NOTE — TELEPHONE ENCOUNTER
Called patient  She is taking 10 mg of Fosamax daily  Taking  2 of her Citracal daily, try to get calcium through her diet also    Repeat DEXA 2 years    Close this call

## 2021-01-12 ENCOUNTER — OFFICE VISIT (OUTPATIENT)
Dept: FAMILY MEDICINE CLINIC | Age: 81
End: 2021-01-12
Payer: COMMERCIAL

## 2021-01-12 VITALS
OXYGEN SATURATION: 98 % | HEIGHT: 61 IN | HEART RATE: 72 BPM | DIASTOLIC BLOOD PRESSURE: 76 MMHG | SYSTOLIC BLOOD PRESSURE: 118 MMHG | WEIGHT: 122 LBS | TEMPERATURE: 97.5 F | BODY MASS INDEX: 23.03 KG/M2

## 2021-01-12 DIAGNOSIS — M81.0 AGE-RELATED OSTEOPOROSIS WITHOUT CURRENT PATHOLOGICAL FRACTURE: ICD-10-CM

## 2021-01-12 DIAGNOSIS — R53.83 FATIGUE, UNSPECIFIED TYPE: ICD-10-CM

## 2021-01-12 DIAGNOSIS — R03.0 ELEVATED BP WITHOUT DIAGNOSIS OF HYPERTENSION: ICD-10-CM

## 2021-01-12 DIAGNOSIS — Z00.00 MEDICARE ANNUAL WELLNESS VISIT, SUBSEQUENT: Primary | ICD-10-CM

## 2021-01-12 DIAGNOSIS — L82.1 SEBORRHEIC KERATOSES: ICD-10-CM

## 2021-01-12 DIAGNOSIS — R03.0 ELEVATED BP WITHOUT DIAGNOSIS OF HYPERTENSION: Primary | ICD-10-CM

## 2021-01-12 PROCEDURE — G0439 PPPS, SUBSEQ VISIT: HCPCS | Performed by: FAMILY MEDICINE

## 2021-01-12 ASSESSMENT — PATIENT HEALTH QUESTIONNAIRE - PHQ9
SUM OF ALL RESPONSES TO PHQ QUESTIONS 1-9: 0
2. FEELING DOWN, DEPRESSED OR HOPELESS: 0
SUM OF ALL RESPONSES TO PHQ QUESTIONS 1-9: 0

## 2021-01-12 NOTE — PROGRESS NOTES
Medicare Annual Wellness Visit  Name: Wilfredo Rodriguez Date: 2021   MRN: [id number] Sex: Female   Age: 79 y.o. Ethnicity: Non-/Non    : 1940 Race: Rhoda Lai is here for Medicare AWV    Screenings for behavioral, psychosocial and functional/safety risks, and cognitive dysfunction are all negative except as indicated below. These results, as well as other patient data from the [address] form, are documented in Flowsheets linked to this Encounter. Taking 10 mg of Fosamax daily given to her by an endocrinologist  Sent DEXA scan reviewed with patient  Immunizations up-to-date  She  is contemplating whether to get coronavirus vaccine    No Known Allergies      Prior to Visit Medications    Medication Sig Taking?  Authorizing Provider   alendronate (FOSAMAX) 10 MG tablet Take 10 mg by mouth Yes Historical Provider, MD   calcium citrate-vitamin D (CITRICAL + D) 315-250 MG-UNIT TABS per tablet Take 1 tablet by mouth 2 times daily (with meals) Yes Historical Provider, MD   Turmeric 500 MG TABS Take 1 tablet by mouth 2 times daily  Yes Historical Provider, MD   estradiol (ESTRACE VAGINAL) 0.1 MG/GM vaginal cream Place 2 g vaginally Twice a Week Yes Historical Provider, MD         Past Medical History:   Diagnosis Date    Age-related osteoporosis without current pathological fracture 2018    Osteoporosis        Past Surgical History:   Procedure Laterality Date    HYSTERECTOMY      OTHER SURGICAL HISTORY  2006    melanoma removed    Samuel Dillon Right     Dr. Joao Hood         Family History   Problem Relation Age of Onset    Cancer Mother     Lung Cancer Father     Coronary Art Dis Brother        CareTeam (Including outside providers/suppliers regularly involved in providing care):   Patient Care Team:  Traci Patiño MD as PCP - General (Family Medicine)  Traci Patiño MD as PCP - REHABILITATION [facility] Empaneled Provider    Wt Readings from Last 3 Encounters: 01/12/21 122 lb (55.3 kg)   11/02/20 123 lb (55.8 kg)   12/20/19 124 lb (56.2 kg)     Vitals:    01/12/21 1130   BP: 118/76   Pulse: 72   Temp: 97.5 °F (36.4 °C)   SpO2: 98%   Weight: 122 lb (55.3 kg)   Height: 5' 1\" (1.549 m)     Body mass index is 23.05 kg/m². Based upon direct observation of the patient, evaluation of cognition reveals recent and remote memory intact. General Appearance: Thin white female, alert and oriented to person, place and time, well developed and well- nourished, in no acute distress  Skin: warm and dry, no rash or erythema  Head: normocephalic and atraumatic  Eyes: pupils equal, round, and reactive to light, extraocular eye movements intact, conjunctivae normal  ENT: tympanic membrane, external ear and ear canal normal bilaterally, nose without deformity, nasal mucosa and turbinates normal without polyps  Neck: supple and non-tender without mass, no thyromegaly or thyroid nodules, no cervical lymphadenopathy  Pulmonary/Chest: clear to auscultation bilaterally- no wheezes, rales or rhonchi, normal air movement, no respiratory distress  Cardiovascular: normal rate, regular rhythm, normal S1 and S2, no murmurs, rubs, clicks, or gallops, distal pulses intact, no carotid bruits  Abdomen: soft, non-tender, non-distended, normal bowel sounds, no masses or organomegaly  Breast: appear normal, no suspicious masses, no skin or nipple changes or axillary nodes  Extremities: no cyanosis, clubbing or edema  Musculoskeletal: normal range of motion, no joint swelling, deformity or tenderness  Neurologic: reflexes normal and symmetric, no cranial nerve deficit, gait, coordination and speech normal    Patient's complete Health Risk Assessment and screening values have been reviewed and are found in Flowsheets. The following problems were reviewed today and where indicated follow up appointments were made and/or referrals ordered.     Positive Risk Factor Screenings with Interventions: General Health and ACP:  General  In general, how would you say your health is?: Excellent  In the past 7 days, have you experienced any of the following?  New or Increased Pain, New or Increased Fatigue, Loneliness, Social Isolation, Stress or Anger?: None of These  Do you get the social and emotional support that you need?: Yes  Do you have a Living Will?: Yes  Advance Directives     Power of  Living Will ACP-Advance Directive ACP-Power of     Not on File Not on File Not on File Not on File      General Health Risk Interventions:  · recommend copy of living will     Health Habits/Nutrition:  Health Habits/Nutrition  Do you exercise for at least 20 minutes 2-3 times per week?: (!) No  Have you lost any weight without trying in the past 3 months?: No  Do you eat fewer than 2 meals per day?: No  Have you seen a dentist within the past year?: Yes  Body mass index: 23.05  Health Habits/Nutrition Interventions:  · eating healthy and exercise    · Sees dentist every 6 months     Hearing/Vision:  No exam data present  Hearing/Vision  Do you or your family notice any trouble with your hearing?: No  Do you have difficulty driving, watching TV, or doing any of your daily activities because of your eyesight?: No  Have you had an eye exam within the past year?: (!) No  Hearing/Vision Interventions:  · Sees eye specialist       Personalized Preventive Plan   Current Health Maintenance Status  Immunization History   Administered Date(s) Administered    Influenza Vaccine, unspecified formulation 11/06/2008, 09/16/2009, 09/22/2010, 10/04/2013, 10/06/2014, 10/23/2015, 09/19/2016, 10/12/2017    Influenza, High Dose (Fluzone 65 yrs and older) 10/09/2018    Influenza, High-dose, Quadv, 65 yrs +, IM (Fluzone) 09/08/2020    Influenza, Triv, inactivated, subunit, adjuvanted, IM (Fluad 65 yrs and older) 09/24/2019    Pneumococcal Conjugate 13-valent ([id number]) 11/03/2008, 10/23/2015    Pneumococcal Polysaccharide ([id number]) 11/03/2008    Td, unspecified formulation 01/01/2003, 06/28/2013    Tdap (Boostrix, Adacel) 01/08/2019    Zoster Recombinant (Shingrix) 02/27/2019, 07/08/2019        Health Maintenance   Topic Date Due    Pneumococcal 65+ years Vaccine (2 of 2 - PPSV23) 10/23/2016    Breast cancer screen  12/28/2022    DTaP/Tdap/Td vaccine (2 - Td) 01/08/2029    DEXA (modify frequency per FRAX score)  Completed    Flu vaccine  Completed    Shingles Vaccine  Completed    Hepatitis A vaccine  Aged Out    Hepatitis B vaccine  Aged Out    Hib vaccine  Aged Out    Meningococcal (ACWY) vaccine  Aged Out     Recommendations for AUM Cardiovascular Due: see orders and patient instructions/AVS.  . Recommended screening schedule for the next 5-10 years is provided to the patient in written form: see Patient Efra Perez was seen today for medicare awv.     Diagnoses and all orders for this visit:    Medicare annual wellness visit, subsequent    Healthy diet, stay active  Try to get as much calcium through her diet as she can  Taking Citracal supplement also    Age-related osteoporosis without current pathological fracture  Stable, continue Fosamax daily 10 mg    Elevated BP without diagnosis of hypertension  Monitor    Seborrheic keratoses  Reassured    Fasting labs in the future

## 2021-01-12 NOTE — PATIENT INSTRUCTIONS
Personalized Preventive Plan for Ricka Kaylee - 1/12/2021  Medicare offers a range of preventive health benefits. Some of the tests and screenings are paid in full while other may be subject to a deductible, co-insurance, and/or copay. Some of these benefits include a comprehensive review of your medical history including lifestyle, illnesses that may run in your family, and various assessments and screenings as appropriate. After reviewing your medical record and screening and assessments performed today your provider may have ordered immunizations, labs, imaging, and/or referrals for you. A list of these orders (if applicable) as well as your Preventive Care list are included within your After Visit Summary for your review. Other Preventive Recommendations:    · A preventive eye exam performed by an eye specialist is recommended every 1-2 years to screen for glaucoma; cataracts, macular degeneration, and other eye disorders. · A preventive dental visit is recommended every 6 months. · Try to get at least 150 minutes of exercise per week or 10,000 steps per day on a pedometer . · Order or download the FREE \"Exercise & Physical Activity: Your Everyday Guide\" from [employer]. Call 2-982.790.2169 or search [employer] online. · You need 9450-1600 mg of calcium and 9024-2400 IU of vitamin D per day. It is possible to meet your calcium requirement with diet alone, but a vitamin D supplement is usually necessary to meet this goal.  · When exposed to the sun, use a sunscreen that protects against both UVA and UVB radiation with an SPF of 30 or greater. Reapply every 2 to 3 hours or after sweating, drying off with a towel, or swimming. · Always wear a seat belt when traveling in a car. Always wear a helmet when riding a bicycle or motorcycle.

## 2021-02-01 NOTE — TELEPHONE ENCOUNTER
Patient wants to know what Dr. Ioana Paulino thinks about her discontinuing Tumeric and taking Naturemade D3 instead.

## 2021-02-01 NOTE — TELEPHONE ENCOUNTER
Yes ,agree with that change    No clear Evidence that turmeric is helpful for arthritis    Study showed that vitamin D3 is protective against Covid and other viruses, helps immune cells work better  Recommend 2000 IUs daily

## 2021-02-04 DIAGNOSIS — R53.83 FATIGUE, UNSPECIFIED TYPE: ICD-10-CM

## 2021-02-04 DIAGNOSIS — M81.0 AGE-RELATED OSTEOPOROSIS WITHOUT CURRENT PATHOLOGICAL FRACTURE: ICD-10-CM

## 2021-02-04 DIAGNOSIS — R03.0 ELEVATED BP WITHOUT DIAGNOSIS OF HYPERTENSION: ICD-10-CM

## 2021-02-04 LAB
A/G RATIO: 1.6 (ref 1.1–2.2)
ALBUMIN SERPL-MCNC: 4.2 G/DL (ref 3.4–5)
ALP BLD-CCNC: 73 U/L (ref 40–129)
ALT SERPL-CCNC: 16 U/L (ref 10–40)
ANION GAP SERPL CALCULATED.3IONS-SCNC: 9 MMOL/L (ref 3–16)
AST SERPL-CCNC: 27 U/L (ref 15–37)
BASOPHILS ABSOLUTE: 0 K/UL (ref 0–0.2)
BASOPHILS RELATIVE PERCENT: 0.7 %
BILIRUB SERPL-MCNC: 0.4 MG/DL (ref 0–1)
BUN BLDV-MCNC: 12 MG/DL (ref 7–20)
CALCIUM SERPL-MCNC: 9.6 MG/DL (ref 8.3–10.6)
CHLORIDE BLD-SCNC: 103 MMOL/L (ref 99–110)
CHOLESTEROL, TOTAL: 221 MG/DL (ref 0–199)
CO2: 28 MMOL/L (ref 21–32)
CREAT SERPL-MCNC: 0.6 MG/DL (ref 0.6–1.2)
EOSINOPHILS ABSOLUTE: 0.1 K/UL (ref 0–0.6)
EOSINOPHILS RELATIVE PERCENT: 2.4 %
GFR AFRICAN AMERICAN: >60
GFR NON-AFRICAN AMERICAN: >60
GLOBULIN: 2.7 G/DL
GLUCOSE FASTING: 89 MG/DL (ref 70–99)
HCT VFR BLD CALC: 39.5 % (ref 36–48)
HDLC SERPL-MCNC: 60 MG/DL (ref 40–60)
HEMOGLOBIN: 13 G/DL (ref 12–16)
LDL CHOLESTEROL CALCULATED: 134 MG/DL
LYMPHOCYTES ABSOLUTE: 1.7 K/UL (ref 1–5.1)
LYMPHOCYTES RELATIVE PERCENT: 30.3 %
MCH RBC QN AUTO: 31.5 PG (ref 26–34)
MCHC RBC AUTO-ENTMCNC: 32.9 G/DL (ref 31–36)
MCV RBC AUTO: 95.7 FL (ref 80–100)
MONOCYTES ABSOLUTE: 0.4 K/UL (ref 0–1.3)
MONOCYTES RELATIVE PERCENT: 7.6 %
NEUTROPHILS ABSOLUTE: 3.3 K/UL (ref 1.7–7.7)
NEUTROPHILS RELATIVE PERCENT: 59 %
PDW BLD-RTO: 12.9 % (ref 12.4–15.4)
PLATELET # BLD: 227 K/UL (ref 135–450)
PMV BLD AUTO: 8.4 FL (ref 5–10.5)
POTASSIUM SERPL-SCNC: 3.9 MMOL/L (ref 3.5–5.1)
RBC # BLD: 4.13 M/UL (ref 4–5.2)
SODIUM BLD-SCNC: 140 MMOL/L (ref 136–145)
TOTAL PROTEIN: 6.9 G/DL (ref 6.4–8.2)
TRIGL SERPL-MCNC: 137 MG/DL (ref 0–150)
TSH REFLEX: 3.46 UIU/ML (ref 0.27–4.2)
VITAMIN D 25-HYDROXY: 39.7 NG/ML
VLDLC SERPL CALC-MCNC: 27 MG/DL
WBC # BLD: 5.6 K/UL (ref 4–11)

## 2021-02-16 ENCOUNTER — TELEPHONE (OUTPATIENT)
Dept: FAMILY MEDICINE CLINIC | Age: 81
End: 2021-02-16

## 2021-02-16 NOTE — TELEPHONE ENCOUNTER
Called patient with results  Elevated  total and LDL cholesterol, torn about starting statin medication  Offered her CT calcium score, she will think about it      Close this call

## 2021-02-17 ENCOUNTER — TELEPHONE (OUTPATIENT)
Dept: FAMILY MEDICINE CLINIC | Age: 81
End: 2021-02-17

## 2021-04-23 ENCOUNTER — NURSE TRIAGE (OUTPATIENT)
Dept: OTHER | Facility: CLINIC | Age: 81
End: 2021-04-23

## 2021-04-23 ENCOUNTER — TELEPHONE (OUTPATIENT)
Dept: FAMILY MEDICINE CLINIC | Age: 81
End: 2021-04-23

## 2021-04-23 ENCOUNTER — OFFICE VISIT (OUTPATIENT)
Dept: FAMILY MEDICINE CLINIC | Age: 81
End: 2021-04-23
Payer: COMMERCIAL

## 2021-04-23 VITALS
WEIGHT: 120.8 LBS | OXYGEN SATURATION: 98 % | SYSTOLIC BLOOD PRESSURE: 143 MMHG | HEART RATE: 68 BPM | HEIGHT: 61 IN | DIASTOLIC BLOOD PRESSURE: 65 MMHG | TEMPERATURE: 98.2 F | BODY MASS INDEX: 22.81 KG/M2

## 2021-04-23 DIAGNOSIS — R22.0 SWELLING OF UPPER LIP: Primary | ICD-10-CM

## 2021-04-23 DIAGNOSIS — R03.0 ELEVATED BP WITHOUT DIAGNOSIS OF HYPERTENSION: ICD-10-CM

## 2021-04-23 DIAGNOSIS — M81.0 AGE-RELATED OSTEOPOROSIS WITHOUT CURRENT PATHOLOGICAL FRACTURE: ICD-10-CM

## 2021-04-23 PROCEDURE — 99213 OFFICE O/P EST LOW 20 MIN: CPT | Performed by: FAMILY MEDICINE

## 2021-04-23 NOTE — PROGRESS NOTES
Dacia Andino is a 79 y.o. female. HPI:  Here to discuss lip swelling, upper right lip, very minimal very localized    Discussed that I do not think this is from her Fosamax so she should not stop it    Pressure at home is normal she gets anxious when she comes to the doctor's office    Meds, vitamins and allergies reviewed with pt    Wt Readings from Last 3 Encounters:   04/23/21 120 lb 12.8 oz (54.8 kg)   01/12/21 122 lb (55.3 kg)   11/02/20 123 lb (55.8 kg)       REVIEW OF SYSTEMS:   CONSTITUTIONAL: See history of present illness,   Weight noted   HEENT: No new vision difficulties or ringing in the ears. RESPIRATORY: No new SOB, PND, orthopnea or cough. CARDIOVASCULAR: no CP, palpitations or SOB with exertion  GI: No nausea, vomiting, diarrhea, constipation, abdominal pain or changes in bowel habits. : No urinary frequency, urgency, incontinence hematuria or dysuria. SKIN: No cyanosis or skin lesions. MUSCULOSKELETAL: No new muscle or joint pain. NEUROLOGICAL: No syncope or TIA-like symptoms. PSYCHIATRIC: No anxiety, insomnia or depression     No Known Allergies    Prior to Visit Medications    Medication Sig Taking?  Authorizing Provider   Cholecalciferol (VITAMIN D3 PO) Take by mouth 2 times daily Yes Historical Provider, MD   alendronate (FOSAMAX) 10 MG tablet Take 10 mg by mouth Yes Historical Provider, MD   CALCIUM CITRATE-VITAMIN D PO Take 1 tablet by mouth 2 times daily (with meals)  Yes Historical Provider, MD   estradiol (ESTRACE VAGINAL) 0.1 MG/GM vaginal cream Place 2 g vaginally Twice a Week Yes Historical Provider, MD       Past Medical History:   Diagnosis Date    Age-related osteoporosis without current pathological fracture 11/5/2018    Osteoporosis        Social History     Tobacco Use    Smoking status: Never Smoker    Smokeless tobacco: Never Used   Substance Use Topics    Alcohol use: No       Family History   Problem Relation Age of Onset    Cancer Mother     Lung

## 2021-04-23 NOTE — TELEPHONE ENCOUNTER
Reason for Disposition   Patient wants to be seen    Answer Assessment - Initial Assessment Questions  1. ONSET: \"When did the swelling start? \" (e.g., minutes, hours, days)     One week ago    2. LOCATION: \"What part of the face is swollen? \"      Upper lip    3. SEVERITY: \"How swollen is it? \"      Upper lip just \"feels puffed up\"    4. ITCHING: \"Is there any itching? \" If so, ask: \"How much? \"   (Scale 1-10; mild, moderate or severe)      Denies    5. PAIN: \"Is the swelling painful to touch? \" If so, ask: \"How painful is it? \"   (Scale 1-10; mild, moderate or severe)      Denies    6. FEVER: \"Do you have a fever? \" If so, ask: \"What is it, how was it measured, and when did it start? \"       Denies    7. CAUSE: \"What do you think is causing the face swelling? \"      Patient spoke with endocrinologist.  Zoe Rowell that upper lip swelling is caused from medication [id number] mg. Patient did not take medication today. Endocrinologist recommended appointment with PCP due to symptoms. 8. RECURRENT SYMPTOM: \"Have you had face swelling before? \" If so, ask: \"When was the last time? \" \"What happened that time? \"      One year ago per patient    9. OTHER SYMPTOMS: \"Do you have any other symptoms? \" (e.g., toothache, leg swelling)      Denies other symptoms    10. PREGNANCY: \"Is there any chance you are pregnant? \" \"When was your last menstrual period? \"        N/A    Protocols used: FACE SWELLING-ADULT-[place]    Received call from Jaquelin Henderson at pre-service center [facility]) [place] with Red Flag Complaint. Brief description of triage: See above. Triage indicates for patient to be seen today, recommended by patient's endocrinologist.    Care advice provided, patient verbalizes understanding; denies any other questions or concerns; instructed to call back for any new or worsening symptoms. Writer provided warm transfer to Nona Hammans at [facility] for appointment scheduling. Attention Provider:   Thank you for allowing me to participate in the care of your patient. The patient was connected to triage in response to information provided to the ECC. Please do not respond through this encounter as the response is not directed to a shared pool.

## 2021-04-23 NOTE — TELEPHONE ENCOUNTER
Pt top lip swollen, started a week ago, off and on, happened last year, where can we work her in? Cant do VV.  Please advise

## 2021-08-31 ENCOUNTER — NURSE TRIAGE (OUTPATIENT)
Dept: OTHER | Facility: CLINIC | Age: 81
End: 2021-08-31

## 2021-08-31 NOTE — TELEPHONE ENCOUNTER
Offer patient appointment with Dr. Logan Poole on Wednesday okay to use same-day or video visit slot

## 2021-08-31 NOTE — TELEPHONE ENCOUNTER
Reason for Disposition   Numbness or tingling on both sides of body and is a new symptom lasting > 24 hours    Answer Assessment - Initial Assessment Questions  1. SYMPTOM: \"What is the main symptom you are concerned about? \" (e.g., weakness, numbness)      Numbness in feet at night    2. ONSET: \"When did this start? \" (minutes, hours, days; while sleeping)  About a month    3. LAST NORMAL: \"When was the last time you were normal (no symptoms)? \"  July 2021    4. PATTERN \"Does this come and go, or has it been constant since it started? \"  \"Is it present now? \"   comes on at night and stays all night and is there for a bit when she is up in the morning and then goes away. Has been roughly every night for the past month    5. CARDIAC SYMPTOMS: \"Have you had any of the following symptoms: chest pain, difficulty breathing, palpitations? \"      None    6. NEUROLOGIC SYMPTOMS: \"Have you had any of the following symptoms: headache, dizziness, vision loss, double vision, changes in speech, unsteady on your feet? \"  None    7. OTHER SYMPTOMS: \"Do you have any other symptoms? \"   none    8. PREGNANCY: \"Is there any chance you are pregnant? \" \"When was your last menstrual period? \"  no    Protocols used: NEUROLOGIC DEFICIT-ADULT-OH    Received call from [facility] with Red Flag Complaint. Brief description of triage: numbness in feet when she sleeps for the past month. Triage indicates for patient to be seen within 3 days. Care advice provided, patient verbalizes understanding; denies any other questions or concerns; instructed to call back for any new or worsening symptoms. Writer provided warm transfer to [facility] for appointment scheduling. Attention Provider: Thank you for allowing me to participate in the care of your patient. The patient was connected to triage in response to information provided to the [facility].   Please do not respond through this encounter as the response

## 2021-09-01 ENCOUNTER — OFFICE VISIT (OUTPATIENT)
Dept: FAMILY MEDICINE CLINIC | Age: 81
End: 2021-09-01
Payer: COMMERCIAL

## 2021-09-01 VITALS
HEART RATE: 65 BPM | HEIGHT: 61 IN | BODY MASS INDEX: 22.69 KG/M2 | SYSTOLIC BLOOD PRESSURE: 138 MMHG | DIASTOLIC BLOOD PRESSURE: 62 MMHG | OXYGEN SATURATION: 98 % | WEIGHT: 120.2 LBS

## 2021-09-01 DIAGNOSIS — R20.2 NUMBNESS AND TINGLING OF BOTH FEET: Primary | ICD-10-CM

## 2021-09-01 DIAGNOSIS — R20.0 NUMBNESS AND TINGLING OF BOTH FEET: Primary | ICD-10-CM

## 2021-09-01 PROCEDURE — 99213 OFFICE O/P EST LOW 20 MIN: CPT | Performed by: NURSE PRACTITIONER

## 2021-09-01 SDOH — ECONOMIC STABILITY: FOOD INSECURITY: WITHIN THE PAST 12 MONTHS, YOU WORRIED THAT YOUR FOOD WOULD RUN OUT BEFORE YOU GOT MONEY TO BUY MORE.: NEVER TRUE

## 2021-09-01 SDOH — ECONOMIC STABILITY: FOOD INSECURITY: WITHIN THE PAST 12 MONTHS, THE FOOD YOU BOUGHT JUST DIDN'T LAST AND YOU DIDN'T HAVE MONEY TO GET MORE.: NEVER TRUE

## 2021-09-01 ASSESSMENT — PATIENT HEALTH QUESTIONNAIRE - PHQ9
2. FEELING DOWN, DEPRESSED OR HOPELESS: 0
SUM OF ALL RESPONSES TO PHQ QUESTIONS 1-9: 0
SUM OF ALL RESPONSES TO PHQ9 QUESTIONS 1 & 2: 0
1. LITTLE INTEREST OR PLEASURE IN DOING THINGS: 0
SUM OF ALL RESPONSES TO PHQ QUESTIONS 1-9: 0
SUM OF ALL RESPONSES TO PHQ QUESTIONS 1-9: 0

## 2021-09-01 ASSESSMENT — ENCOUNTER SYMPTOMS
GASTROINTESTINAL NEGATIVE: 1
RESPIRATORY NEGATIVE: 1

## 2021-09-01 ASSESSMENT — SOCIAL DETERMINANTS OF HEALTH (SDOH): HOW HARD IS IT FOR YOU TO PAY FOR THE VERY BASICS LIKE FOOD, HOUSING, MEDICAL CARE, AND HEATING?: NOT HARD AT ALL

## 2021-09-01 NOTE — PATIENT INSTRUCTIONS
Patient Education        Numbness and Tingling: Care Instructions  Your Care Instructions     Many things can cause numbness or tingling. Swelling may put pressure on a nerve. This could cause you to lose feeling or have a pins-and-needles sensation on part of your body. Nerves may be damaged from trauma, toxins, or diseases, such as diabetes or multiple sclerosis (MS). Sometimes, though, the cause is not clear. If there is no clear reason for your symptoms, and you are not having any other symptoms, your doctor may suggest watching and waiting for a while to see if the numbness or tingling goes away on its own. Your doctor may want you to have blood or nerve tests to find the cause of your symptoms. Follow-up care is a key part of your treatment and safety. Be sure to make and go to all appointments, and call your doctor if you are having problems. It's also a good idea to know your test results and keep a list of the medicines you take. How can you care for yourself at home? · If your doctor prescribes medicine, take it exactly as directed. Call your doctor if you think you are having a problem with your medicine. · If you have any swelling, put ice or a cold pack on the area for 10 to 20 minutes at a time. Put a thin cloth between the ice and your skin. When should you call for help? Call 911 anytime you think you may need emergency care. For example, call if:    · You have weakness, numbness, or tingling in both legs.     · You lose bowel or bladder control.     · You have symptoms of a stroke. These may include:  ? Sudden numbness, tingling, weakness, or loss of movement in your face, arm, or leg, especially on only one side of your body. ? Sudden vision changes. ? Sudden trouble speaking. ? Sudden confusion or trouble understanding simple statements. ? Sudden problems with walking or balance. ? A sudden, severe headache that is different from past headaches.    Watch closely for changes in your [employer], and be sure to contact your doctor if you have any problems, or if:    · You do not get better as expected. Where can you learn more? Go to [email or web address] and sign in to your [employer] account. Enter E710 in the [employer] box to learn more about \"Numbness and Tingling: Care Instructions. \"     If you do not have an account, please click on the \"Sign Up Now\" link. Current as of: August 4, 2020               Content Version: 12.9  © 8721-7651 [employer]wise, Incorporated. Care instructions adapted under license by [facility] ([facility]). If you have questions about a medical condition or this instruction, always ask your healthcare professional. [address] any warranty or liability for your use of this information.

## 2021-09-01 NOTE — PROGRESS NOTES
2021     Lamont Robins (:  1940) is a 79 y.o. female, here for evaluation of the following medical concerns:    Chief Complaint   Patient presents with    Numbness     numbness and tingling in feet only at night in bed, x1 month     Numbness and tingling in bilateral feet at night when laying flat. Onset around 2021. Denies recent illness, fever, rash or injury. She is not diabetic. She describes the symptoms of \"pins and needles\". Happens nightly. Review of Systems   Constitutional: Negative. Negative for fatigue and fever. Respiratory: Negative. Cardiovascular: Negative. Gastrointestinal: Negative. Genitourinary: Negative. Musculoskeletal:        Numbness/tingling bilateral feet   Skin: Negative for rash. Neurological: Positive for numbness. Negative for weakness. Psychiatric/Behavioral: The patient is not nervous/anxious. Prior to Visit Medications    Medication Sig Taking? Authorizing Provider   Cholecalciferol (VITAMIN D3 PO) Take by mouth 2 times daily Yes Historical Provider, MD   alendronate (FOSAMAX) 10 MG tablet Take 10 mg by mouth Yes Historical Provider, MD   CALCIUM CITRATE-VITAMIN D PO Take 1 tablet by mouth 2 times daily (with meals)  Yes Historical Provider, MD   estradiol (ESTRACE VAGINAL) 0.1 MG/GM vaginal cream Place 2 g vaginally Twice a Week Yes Historical Provider, MD        Social History     Tobacco Use    Smoking status: Never Smoker    Smokeless tobacco: Never Used   Vaping Use    Vaping Use: Never used   Substance Use Topics    Alcohol use: No    Drug use: Not on file        Vitals:    21 1055 21 1100   BP: (!) 140/62 138/62   Pulse: 65    SpO2: 98%    Weight: 120 lb 3.2 oz (54.5 kg)    Height: 5' 1\" (1.549 m)      Estimated body mass index is 22.71 kg/m² as calculated from the following:    Height as of this encounter: 5' 1\" (1.549 m). Weight as of this encounter: 120 lb 3.2 oz (54.5 kg).     Physical Exam  Vitals and nursing note reviewed. Constitutional:       General: She is awake. She is not in acute distress. Appearance: Normal appearance. She is well-developed, well-groomed and normal weight. She is not ill-appearing. Cardiovascular:      Rate and Rhythm: Normal rate and regular rhythm. Pulses:           Carotid pulses are 2+ on the right side and 2+ on the left side. Radial pulses are 2+ on the right side and 2+ on the left side. Femoral pulses are 2+ on the right side and 2+ on the left side. Popliteal pulses are 1+ on the right side and 1+ on the left side. Dorsalis pedis pulses are 1+ on the right side and 1+ on the left side. Posterior tibial pulses are 1+ on the right side and 1+ on the left side. Heart sounds: Normal heart sounds, S1 normal and S2 normal. No murmur heard. Pulmonary:      Effort: Pulmonary effort is normal.      Breath sounds: Normal breath sounds and air entry. Musculoskeletal:      Right lower leg: No edema. Left lower leg: No edema. Feet:      Right foot:      Skin integrity: Skin integrity normal.      Toenail Condition: Right toenails are normal.      Left foot:      Skin integrity: Skin integrity normal.      Toenail Condition: Left toenails are normal.   Skin:     General: Skin is warm and dry. Capillary Refill: Capillary refill takes less than 2 seconds. Neurological:      General: No focal deficit present. Mental Status: She is alert. Deep Tendon Reflexes:      Reflex Scores:       Patellar reflexes are 2+ on the right side and 2+ on the left side. Achilles reflexes are 2+ on the right side and 2+ on the left side. Psychiatric:         Mood and Affect: Mood normal.         Behavior: Behavior is cooperative. ASSESSMENT/PLAN:  1. Numbness and tingling of both feet  ? ? Etiology  Discussed with Dr. Huang Patrick  - Diabetic Lanny Milton;  Future  - C-REACTIVE PROTEIN; Future    Return in about 2 weeks (around 9/15/2021) for with Dr. Beltran Wing.

## 2021-10-26 ENCOUNTER — OFFICE VISIT (OUTPATIENT)
Dept: FAMILY MEDICINE CLINIC | Age: 81
End: 2021-10-26
Payer: COMMERCIAL

## 2021-10-26 VITALS
BODY MASS INDEX: 22.86 KG/M2 | WEIGHT: 121 LBS | TEMPERATURE: 96.8 F | OXYGEN SATURATION: 98 % | HEART RATE: 70 BPM | SYSTOLIC BLOOD PRESSURE: 138 MMHG | DIASTOLIC BLOOD PRESSURE: 74 MMHG

## 2021-10-26 DIAGNOSIS — I73.00 RAYNAUD'S PHENOMENON WITHOUT GANGRENE: Primary | ICD-10-CM

## 2021-10-26 DIAGNOSIS — Z23 NEED FOR COVID-19 VACCINE: ICD-10-CM

## 2021-10-26 PROCEDURE — 99213 OFFICE O/P EST LOW 20 MIN: CPT | Performed by: FAMILY MEDICINE

## 2021-10-26 RX ORDER — ALENDRONATE SODIUM 10 MG/1
TABLET ORAL
COMMUNITY
Start: 2021-01-21

## 2021-10-26 NOTE — PROGRESS NOTES
Gloria Boudreaux is a 79 y.o. female. HPI:  Here for recheck blood pressure and med check, will offer flu vaccine  Recommend Covid booster in the near future    Main complaint today is her toes feel cold at night  No pain with walking or during the day just at night  Recent lab work was unremarkable    Meds, vitamins and allergies reviewed with pt    Wt Readings from Last 3 Encounters:   10/26/21 121 lb (54.9 kg)   09/01/21 120 lb 3.2 oz (54.5 kg)   04/23/21 120 lb 12.8 oz (54.8 kg)       REVIEW OF SYSTEMS:   CONSTITUTIONAL: See history of present illness,   Weight noted   HEENT: No new vision difficulties or ringing in the ears. RESPIRATORY: No new SOB, PND, orthopnea or cough. CARDIOVASCULAR: no CP, palpitations or SOB with exertion  GI: No nausea, vomiting, diarrhea, constipation, abdominal pain or changes in bowel habits. : No urinary frequency, urgency, incontinence hematuria or dysuria. SKIN: No cyanosis or skin lesions. MUSCULOSKELETAL: No new muscle or joint pain. NEUROLOGICAL: No syncope or TIA-like symptoms. PSYCHIATRIC: No anxiety, insomnia or depression     No Known Allergies    Prior to Visit Medications    Medication Sig Taking?  Authorizing Provider   alendronate (FOSAMAX) 10 MG tablet TAKE 1 TABLET BY MOUTH EVERY DAY Yes Historical Provider, MD   Cholecalciferol (VITAMIN D3 PO) Take by mouth 2 times daily Yes Historical Provider, MD   CALCIUM CITRATE-VITAMIN D PO Take 1 tablet by mouth 2 times daily (with meals)  Yes Historical Provider, MD   estradiol (ESTRACE VAGINAL) 0.1 MG/GM vaginal cream Place 2 g vaginally Twice a Week Yes Historical Provider, MD       Past Medical History:   Diagnosis Date    Age-related osteoporosis without current pathological fracture 11/5/2018    Osteoporosis        Social History     Tobacco Use    Smoking status: Never Smoker    Smokeless tobacco: Never Used   Substance Use Topics    Alcohol use: No       Family History   Problem Relation Age of Onset    Cancer Mother     Lung Cancer Father     Coronary Art Dis Brother        OBJECTIVE:  /74   Pulse 70   Temp 96.8 °F (36 °C)   Wt 121 lb (54.9 kg)   SpO2 98%   BMI 22.86 kg/m²   GEN:  in NAD  HEENT:  NCAT, TMs:normal and throat: Not examined due to Covid/mask  NECK:  Supple without adenopathy. CV:  Regular rate and rhythm, S1 and S2 normal, no murmurs, clicks  PULM:  Chest is clear, no wheezing ,  symmetric air entry throughout both lung fields. ABD: Soft, NT no masses appreciable  EXT: No rash or edema  Feet: Good pulses bilaterally, positive Raynaud's in her toes  NEURO: Alert oriented ×3, nonfocal     ASSESSMENT/PLAN:  1. Raynaud's phenomenon without gangrene  Trial of wearing socks at bedtime  Otherwise can live with it     2.   Recommend Covid vaccine  Booster in future    20 Total Minutes spent pre charting (reviewing problem list, meds, any test results, consultant and hospital notes, health maintenance reviewed with patient) and  obtaining present visit history, performing appropriate medical exam/evaluation, counseling and educating the patient (and family), ordering medications ,tests, and procedures as needed, refilling medication(s), placing referral(s) when needed in addition to coordinating care for this patient and documenting in electronic health record

## 2021-11-12 ENCOUNTER — TELEPHONE (OUTPATIENT)
Dept: FAMILY MEDICINE CLINIC | Age: 81
End: 2021-11-12

## 2021-11-12 DIAGNOSIS — L91.8 SKIN TAG: Primary | ICD-10-CM

## 2021-11-12 NOTE — TELEPHONE ENCOUNTER
----- Message from Evans Duggan sent at 11/12/2021  9:01 AM EST -----  Subject: Referral Request    QUESTIONS   Reason for referral request? Would like a referral to nearby dermatologist     Has the physician seen you for this condition before? No   Preferred Specialist (if applicable)? Do you already have an appointment scheduled? No  Additional Information for Provider?   ---------------------------------------------------------------------------  --------------  CALL BACK INFO  What is the best way for the office to contact you? OK to leave message on   voicemail  Preferred Call Back Phone Number?  [phone]

## 2021-11-22 ENCOUNTER — TELEPHONE (OUTPATIENT)
Dept: FAMILY MEDICINE CLINIC | Age: 81
End: 2021-11-22

## 2021-11-23 ENCOUNTER — OFFICE VISIT (OUTPATIENT)
Dept: FAMILY MEDICINE CLINIC | Age: 81
End: 2021-11-23
Payer: COMMERCIAL

## 2021-11-23 VITALS
TEMPERATURE: 97 F | HEART RATE: 67 BPM | OXYGEN SATURATION: 98 % | WEIGHT: 121 LBS | DIASTOLIC BLOOD PRESSURE: 70 MMHG | SYSTOLIC BLOOD PRESSURE: 130 MMHG | BODY MASS INDEX: 22.86 KG/M2

## 2021-11-23 DIAGNOSIS — H91.90 DECREASED HEARING, UNSPECIFIED LATERALITY: Primary | ICD-10-CM

## 2021-11-23 DIAGNOSIS — H61.23 EXCESSIVE EAR WAX, BILATERAL: ICD-10-CM

## 2021-11-23 PROCEDURE — 99213 OFFICE O/P EST LOW 20 MIN: CPT | Performed by: FAMILY MEDICINE

## 2021-11-23 PROCEDURE — 69210 REMOVE IMPACTED EAR WAX UNI: CPT | Performed by: FAMILY MEDICINE

## 2021-11-23 NOTE — TELEPHONE ENCOUNTER
Patient is requesting to come in today rather than tomorrow for her clogged ear. No appointments available today.

## 2021-11-23 NOTE — PROGRESS NOTES
Cayden Pendleton is a 79 y.o. female. HPI:  Here For ear check and possible lavage  Has been using Debrox drops, ears still feel clogged, feels like she cannot hear very well mainly out of her right    No illness, no discharge from her ear    Meds, vitamins and allergies reviewed with pt    Wt Readings from Last 3 Encounters:   11/23/21 121 lb (54.9 kg)   10/26/21 121 lb (54.9 kg)   09/01/21 120 lb 3.2 oz (54.5 kg)       REVIEW OF SYSTEMS:   CONSTITUTIONAL: See history of present illness,   Weight noted   HEENT: No new vision difficulties, see HPI  RESPIRATORY: No new SOB, PND, orthopnea or cough. CARDIOVASCULAR: no CP, palpitations or SOB with exertion  GI: No nausea, vomiting, diarrhea, constipation, abdominal pain or changes in bowel habits. : No urinary frequency, urgency, incontinence hematuria or dysuria. SKIN: No cyanosis or skin lesions. MUSCULOSKELETAL: No new muscle or joint pain. NEUROLOGICAL: No syncope or TIA-like symptoms. PSYCHIATRIC: No anxiety, insomnia or depression     No Known Allergies    Prior to Visit Medications    Medication Sig Taking?  Authorizing Provider   alendronate (FOSAMAX) 10 MG tablet TAKE 1 TABLET BY MOUTH EVERY DAY Yes Historical Provider, MD   Cholecalciferol (VITAMIN D3 PO) Take by mouth 2 times daily Yes Historical Provider, MD   CALCIUM CITRATE-VITAMIN D PO Take 1 tablet by mouth 2 times daily (with meals)  Yes Historical Provider, MD   estradiol (ESTRACE VAGINAL) 0.1 MG/GM vaginal cream Place 2 g vaginally Twice a Week Yes Historical Provider, MD       Past Medical History:   Diagnosis Date    Age-related osteoporosis without current pathological fracture 11/5/2018    Osteoporosis        Social History     Tobacco Use    Smoking status: Never Smoker    Smokeless tobacco: Never Used   Substance Use Topics    Alcohol use: No       Family History   Problem Relation Age of Onset    Cancer Mother     Lung Cancer Father     Coronary Art Dis Brother OBJECTIVE:  /70   Pulse 67   Temp 97 °F (36.1 °C)   Wt 121 lb (54.9 kg)   SpO2 98%   BMI 22.86 kg/m²   GEN:  in NAD  HEENT:  NCAT, TMs: Some wax in both canals, curettaged out, TMs look normal and throat: Not examined due to Covid/mask  NECK:  Supple without adenopathy. CV:  Regular rate and rhythm, S1 and S2 normal, no murmurs, clicks  PULM:  Chest is clear, no wheezing ,  symmetric air entry throughout both lung fields. ABD: Soft, NT, no masses  EXT: No rash or edema  NEURO: Alert oriented ×3, nonfocal no assistive device    ASSESSMENT/PLAN:  1. Decreased hearing, unspecified laterality  Refer  - [address], [facility] [place], Audiology, [facility]    2.  Excessive ear wax, bilateral  Excessive wax extracted with curett      20 Total Minutes spent pre charting (reviewing problem list, meds, any test results, consultant and hospital notes, health maintenance up-to-date) and  obtaining present visit history, performing appropriate medical exam/evaluation, counseling and educating the patient (and family), ordering medications ,tests, and procedures as needed, refilling medication(s), placing referral(s) when needed in addition to coordinating care for this patient and documenting in electronic health record

## 2021-11-26 ENCOUNTER — TELEPHONE (OUTPATIENT)
Dept: FAMILY MEDICINE CLINIC | Age: 81
End: 2021-11-26

## 2021-11-26 NOTE — TELEPHONE ENCOUNTER
You referred her to dr Darlene Louis for audiogram, she called their office and they said she should see an ENT , not the audiologist.    What should she do? Has apt 12-6 with audiologist  But should  she see ENT instead.

## 2021-11-29 ENCOUNTER — TELEPHONE (OUTPATIENT)
Dept: FAMILY MEDICINE CLINIC | Age: 81
End: 2021-11-29

## 2021-11-29 DIAGNOSIS — L91.8 SKIN TAG: Primary | ICD-10-CM

## 2021-11-29 NOTE — TELEPHONE ENCOUNTER
Patient tried to make appt. w/Derm. that Dr. Darcy Hargrove recommended, but they are practicing in [facility]  Patient found a Derm. close to her home,  Dr. Libia Arreguin, [place]  Patient is asking if this is ok w/  Contact patient

## 2021-11-30 DIAGNOSIS — R23.9 ABNORMAL APPEARANCE OF SKIN: Primary | ICD-10-CM

## 2021-11-30 NOTE — TELEPHONE ENCOUNTER
Placed referral to Dr. Dione Prader in [facility], please have her try the prescription    Call her with the number

## 2021-11-30 NOTE — TELEPHONE ENCOUNTER
Let patient know that is okay to see the [facility] dermatology  [address] dermatologist are not taking new patient

## 2021-11-30 NOTE — TELEPHONE ENCOUNTER
Patient called back and stated that the dermatologist that she was referred to  Is only affiliated with [address] [place] [address]. . She is asking for another referral to a different provider.

## 2021-11-30 NOTE — TELEPHONE ENCOUNTER
Pt says they wont see her at [facility]. Who else can she see?  Would prefer a female

## 2021-12-01 ENCOUNTER — TELEPHONE (OUTPATIENT)
Dept: FAMILY MEDICINE CLINIC | Age: 81
End: 2021-12-01

## 2021-12-01 NOTE — TELEPHONE ENCOUNTER
----- Message from Brianna Mobile Roadie sent at 12/1/2021  8:14 AM EST -----  Subject: Message to Provider    QUESTIONS  Information for Provider? Pt called to inform their pcp that they   scheduled an appt with Dermatology based upon a referral but was unable to   be scheduled until January. Please advise.   ---------------------------------------------------------------------------  --------------  CALL BACK INFO  What is the best way for the office to contact you? OK to leave message on   voicemail  Preferred Call Back Phone Number? [phone]  ---------------------------------------------------------------------------  --------------  SCRIPT ANSWERS  Relationship to Patient?  Self

## 2021-12-01 NOTE — TELEPHONE ENCOUNTER
Called the patient and recommend she asked to be put on cancellation list  Okay to wait    Close this call

## 2021-12-01 NOTE — TELEPHONE ENCOUNTER
----- Message from Tammie Cervantes sent at 12/1/2021  4:18 PM EST -----  Subject: Message to Provider    QUESTIONS  Information for Provider? patient calling regarding looking at her back (   not a rash-hard to explain ) to see if she needs to be referred to a   dermatologist. please advise   ---------------------------------------------------------------------------  --------------  [address]  What is the best way for the office to contact you? OK to leave message on   voicemail  Preferred Call Back Phone Number? [phone]  ---------------------------------------------------------------------------  --------------  SCRIPT ANSWERS  Relationship to Patient?  Self

## 2021-12-02 NOTE — PROGRESS NOTES
Joselin Tan is a 79 y.o. female. HPI:  Here to have her left ear looked at, concerned about wax  Also concerned about lesions on her chest under her breast and on her back/seborrheic keratoses itching and bothering her. .. Has Derm appointment to see if she can have these removed    Meds, vitamins and allergies reviewed with pt    Wt Readings from Last 3 Encounters:   12/03/21 119 lb (54 kg)   11/23/21 121 lb (54.9 kg)   10/26/21 121 lb (54.9 kg)       REVIEW OF SYSTEMS:   CONSTITUTIONAL: See history of present illness,   Weight noted   HEENT: No new vision difficulties or ringing in the ears. RESPIRATORY: No new SOB, PND, orthopnea or cough. CARDIOVASCULAR: no CP, palpitations or SOB with exertion  GI: No nausea, vomiting, diarrhea, constipation, abdominal pain or changes in bowel habits. : No urinary frequency, urgency, incontinence hematuria or dysuria. SKIN: No cyanosis or skin lesions. MUSCULOSKELETAL: No new muscle or joint pain. NEUROLOGICAL: No syncope or TIA-like symptoms. PSYCHIATRIC: No anxiety, insomnia or depression     No Known Allergies    Prior to Visit Medications    Medication Sig Taking?  Authorizing Provider   carbamide peroxide (DEBROX) 6.5 % otic solution Place 3 drops into the left ear 2 times daily Yes Lily Velazquez MD   alendronate (FOSAMAX) 10 MG tablet TAKE 1 TABLET BY MOUTH EVERY DAY Yes Historical Provider, MD   Cholecalciferol (VITAMIN D3 PO) Take by mouth 2 times daily Yes Historical Provider, MD   CALCIUM CITRATE-VITAMIN D PO Take 1 tablet by mouth 3 times daily  Yes Historical Provider, MD   estradiol (ESTRACE VAGINAL) 0.1 MG/GM vaginal cream Place 2 g vaginally Twice a Week Yes Historical Provider, MD       Past Medical History:   Diagnosis Date    Age-related osteoporosis without current pathological fracture 11/5/2018    Osteoporosis        Social History     Tobacco Use    Smoking status: Never Smoker    Smokeless tobacco: Never Used   Substance Use Topics  Alcohol use: No       Family History   Problem Relation Age of Onset    Cancer Mother     Lung Cancer Father     Coronary Art Dis Brother        OBJECTIVE:  /70   Pulse 67   Temp 96.8 °F (36 °C)   Wt 119 lb (54 kg)   SpO2 98%   BMI 22.48 kg/m²   GEN:  in NAD  HEENT:  NCAT, TMs: Right TM and canal clear, left TM clear, left canal with some wax  and throat: Not examined due to Covid/mask  NECK:  Supple without adenopathy. CV:  Regular rate and rhythm, S1 and S2 normal, no murmurs, clicks  PULM:  Chest is clear, no wheezing ,  symmetric air entry throughout both lung fields. ABD: Soft, NT, no masses appreciable  EXT: Multiple SKs chest under breast, abdomen and back, some of them are large and itchy  NEURO: Alert oriented ×3, nonfocal no assistive device    ASSESSMENT/PLAN:  1. Excessive ear wax, left  Debrox drops to left ear  Seeing audiologist as she is concerned about her hearing  - carbamide peroxide (DEBROX) 6.5 % otic solution; Place 3 drops into the left ear 2 times daily  Dispense: 15 mL; Refill: 0    2.  Seborrheic keratosis, inflamed  Reassured  Use hydrocortisone cream or A&E ointment to help lubricate these lesions      20 Total Minutes spent pre charting (reviewing problem list, meds, any test results, consultant and hospital notes, of maintenance up-to-date) and  obtaining present visit history, performing appropriate medical exam/evaluation, counseling and educating the patient (and family), ordering medications ,tests, and procedures as needed, refilling medication(s), placing referral(s) when needed in addition to coordinating care for this patient and documenting in electronic health record

## 2021-12-03 ENCOUNTER — OFFICE VISIT (OUTPATIENT)
Dept: FAMILY MEDICINE CLINIC | Age: 81
End: 2021-12-03
Payer: COMMERCIAL

## 2021-12-03 VITALS
BODY MASS INDEX: 22.48 KG/M2 | OXYGEN SATURATION: 98 % | TEMPERATURE: 96.8 F | DIASTOLIC BLOOD PRESSURE: 70 MMHG | WEIGHT: 119 LBS | SYSTOLIC BLOOD PRESSURE: 138 MMHG | HEART RATE: 67 BPM

## 2021-12-03 DIAGNOSIS — H61.22 EXCESSIVE EAR WAX, LEFT: Primary | ICD-10-CM

## 2021-12-03 DIAGNOSIS — L82.0 SEBORRHEIC KERATOSIS, INFLAMED: ICD-10-CM

## 2021-12-03 PROCEDURE — 99213 OFFICE O/P EST LOW 20 MIN: CPT | Performed by: FAMILY MEDICINE

## 2021-12-06 ENCOUNTER — PROCEDURE VISIT (OUTPATIENT)
Dept: AUDIOLOGY | Age: 81
End: 2021-12-06
Payer: COMMERCIAL

## 2021-12-06 DIAGNOSIS — H90.6 MIXED CONDUCTIVE AND SENSORINEURAL HEARING LOSS OF BOTH EARS: ICD-10-CM

## 2021-12-06 DIAGNOSIS — H69.93 TYPE C TYMPANOGRAM OF BOTH EARS: Primary | ICD-10-CM

## 2021-12-06 PROCEDURE — 92567 TYMPANOMETRY: CPT | Performed by: AUDIOLOGIST

## 2021-12-06 PROCEDURE — 92557 COMPREHENSIVE HEARING TEST: CPT | Performed by: AUDIOLOGIST

## 2021-12-06 NOTE — PROGRESS NOTES
[facility]  Division of Audiology/Otolaryngology    12/6/2021     PCP: Amy Cheng MD   MRN: [id number]     AUDIOLOGIC AND OTHER PERTINENT MEDICAL HISTORY:      Alexis De Los Santos was seen for hearing and middle ear evaluation. Anca Kinney MD is referral source of today's appointment. Patient presents with hearing loss which has progressed over time. Reports no debilitating effects or concerns with hearing status. Denies tinnitus. ASSESSMENT AND FINDINGS:     Otoscopy revealed: Visible tympanic membrane bilaterally    RIGHT EAR:  Hearing Sensitivity: Moderate mixed loss   Word Recognition: Excellent (%), based on NU-6   Tympanometry: Negative peak pressure with normal compliance, Type C tympanogram, consistent with ETD/history of otitis media. Acoustic Reflexes: Ipsilateral: Absent. LEFT EAR:  Hearing Sensitivity: Moderate mixed loss   Word Recognition: Excellent (%), based on NU-6   Tympanometry: Negative peak pressure with normal compliance, Type C tympanogram, consistent with ETD/history of otitis media. Acoustic Reflexes: Ipsilateral: Present at normal sensation levels and Present at elevated sensation levels. COUNSELING:        Reviewed purpose of testing completed today, general auditory system function, and results obtained today. Degree of   Hearing Sensitivity dB Range   Within Normal Limits (WNL) 0 - 20   Mild 20 - 40   Moderate 40 - 55   Moderately-Severe 55 - 70   Severe 70 - 90   Profound 90 +          RECOMMENDATIONS:          Type C tympanogram and hearing status warrants ENT referral. Patient informed this. She, however, denied pursuing ENT recommendation at this time. she is in agreement with monitoring hearing levels yearly.       Electronically signed by Annamaria Burger on 12/6/21 at 1:32 PM.

## 2022-03-07 ENCOUNTER — TELEPHONE (OUTPATIENT)
Dept: FAMILY MEDICINE CLINIC | Age: 82
End: 2022-03-07

## 2022-03-07 NOTE — TELEPHONE ENCOUNTER
Patient contacted office to try and get an appointment asap with Dr. Janice Badillo or another Physician. Patient is complaining of onset leg pain. Please contact Patient.

## 2022-03-08 NOTE — PROGRESS NOTES
Medicare Annual Wellness Visit    Rao Irvin is here for Leg Pain (right leg)    Assessment & Plan   Shawn Guillen was seen today for leg pain. Diagnoses and all orders for this visit:    Medicare annual wellness visit, subsequent  Healthy diet, stay active, updated with immunization    Elevated BP without diagnosis of hypertension  Pressure at home 120/70-80  Normal echo in recent past    Age-related osteoporosis without current pathological fracture  Continue Fosamax    Chronic right hip pain  Ice, Voltaren  X-ray  Alternate 650 mg tylenol with  400 mg advil  Every 6-8 hrs as needed for  pain  -     XR HIP RIGHT (2-3 VIEWS); Future         Recommendations for Preventive Services Due: see orders and patient instructions/AVS.  Recommended screening schedule for the next 5-10 years is provided to the patient in written form: see Patient Instructions/AVS.     Return for Medicare Annual Wellness Visit in 1 year. Subjective   The following acute and/or chronic problems were also addressed today:  Blood pressure in office    Patient's complete Health Risk Assessment and screening values have been reviewed and are found in Flowsheets. The following problems were reviewed today and where indicated follow up appointments were made and/or referrals ordered.     Positive Risk Factor Screenings with Interventions:               General Health and ACP:  General  In general, how would you say your health is?: Excellent  In the past 7 days, have you experienced any of the following: New or Increased Pain, New or Increased Fatigue, Loneliness, Social Isolation, Stress or Anger?: (!) Yes  Select all that apply: (!) New or Increased Pain  Do you get the social and emotional support that you need?: Yes  Do you have a Living Will?: Yes    Advance Directives     Power of  Living Will ACP-Advance Directive ACP-Power of     Not on File Not on File Not on File Not on [address] Interventions:  · copy Tal to us              Objective   Vitals:    03/09/22 1151 03/09/22 1210   BP: (!) 187/85 138/80   Pulse: 69    SpO2: 99%    Weight: 120 lb 6.4 oz (54.6 kg)       Body mass index is 22.75 kg/m². General Appearance: alert and oriented to person, place and time, well developed and well- nourished, in no acute distress, complaint of right perihip pain  Skin: warm and dry, no rash or erythema  Head: normocephalic and atraumatic  Eyes: pupils equal, round, and reactive to light, extraocular eye movements intact, conjunctivae normal  ENT: tympanic membrane, external ear and ear canal normal bilaterally, nose without deformity, nasal mucosa and turbinates normal without polyps  Neck: supple and non-tender without mass, no thyromegaly or thyroid nodules, no cervical lymphadenopathy  Pulmonary/Chest: clear to auscultation bilaterally- no wheezes, rales or rhonchi, normal air movement, no respiratory distress  Cardiovascular: normal rate, regular rhythm, normal S1 and S2, no murmurs, rubs, clicks, or gallops, distal pulses intact, no carotid bruits  Abdomen: soft, non-tender, non-distended, normal bowel sounds, no masses or organomegaly  Breast: appear normal, no suspicious masses, no skin or nipple changes or axillary nodes  Extremities: no cyanosis, clubbing or edema  Musculoskeletal:+ Right hip discomfort  Pain right hip with internal and external rotation and with straight leg raise  Neurologic: reflexes normal and symmetric, no cranial nerve deficit, gait, coordination and speech normal       No Known Allergies  Prior to Visit Medications    Medication Sig Taking?  Authorizing Provider   alendronate (FOSAMAX) 10 MG tablet TAKE 1 TABLET BY MOUTH EVERY DAY Yes Historical Provider, MD   Cholecalciferol (VITAMIN D3 PO) Take by mouth 2 times daily Yes Historical Provider, MD   estradiol (ESTRACE VAGINAL) 0.1 MG/GM vaginal cream Place 2 g vaginally Twice a Week Yes Historical Provider, MD CALCIUM CITRATE-VITAMIN D PO Take 2 capsules by mouth 2 times daily 800 mg a day  Historical Provider, MD Forrester (Including outside providers/suppliers regularly involved in providing care):   Patient Care Team:  Patricia Garces MD as PCP - General (Family Medicine)  Patricia Garces MD as PCP - [facility] Empaneled Provider    Reviewed and updated this visit:  Tobacco  Allergies  Meds  Problems  Med Hx  Surg Hx  Soc Hx  Fam Hx

## 2022-03-09 ENCOUNTER — HOSPITAL ENCOUNTER (OUTPATIENT)
Dept: GENERAL RADIOLOGY | Age: 82
Discharge: HOME | End: 2022-03-09
Payer: COMMERCIAL

## 2022-03-09 ENCOUNTER — OFFICE VISIT (OUTPATIENT)
Dept: FAMILY MEDICINE CLINIC | Age: 82
End: 2022-03-09
Payer: COMMERCIAL

## 2022-03-09 ENCOUNTER — HOSPITAL ENCOUNTER (OUTPATIENT)
Age: 82
Discharge: HOME | End: 2022-03-09
Payer: COMMERCIAL

## 2022-03-09 ENCOUNTER — TELEPHONE (OUTPATIENT)
Dept: FAMILY MEDICINE CLINIC | Age: 82
End: 2022-03-09

## 2022-03-09 VITALS
BODY MASS INDEX: 22.75 KG/M2 | HEART RATE: 69 BPM | SYSTOLIC BLOOD PRESSURE: 138 MMHG | OXYGEN SATURATION: 99 % | WEIGHT: 120.4 LBS | DIASTOLIC BLOOD PRESSURE: 80 MMHG

## 2022-03-09 DIAGNOSIS — Z00.00 MEDICARE ANNUAL WELLNESS VISIT, SUBSEQUENT: Primary | ICD-10-CM

## 2022-03-09 DIAGNOSIS — G89.29 CHRONIC RIGHT HIP PAIN: ICD-10-CM

## 2022-03-09 DIAGNOSIS — M25.551 CHRONIC RIGHT HIP PAIN: ICD-10-CM

## 2022-03-09 DIAGNOSIS — R03.0 ELEVATED BP WITHOUT DIAGNOSIS OF HYPERTENSION: ICD-10-CM

## 2022-03-09 DIAGNOSIS — M81.0 AGE-RELATED OSTEOPOROSIS WITHOUT CURRENT PATHOLOGICAL FRACTURE: ICD-10-CM

## 2022-03-09 PROCEDURE — 73502 X-RAY EXAM HIP UNI 2-3 VIEWS: CPT

## 2022-03-09 PROCEDURE — 99213 OFFICE O/P EST LOW 20 MIN: CPT | Performed by: FAMILY MEDICINE

## 2022-03-09 PROCEDURE — G0439 PPPS, SUBSEQ VISIT: HCPCS | Performed by: FAMILY MEDICINE

## 2022-03-09 ASSESSMENT — PATIENT HEALTH QUESTIONNAIRE - PHQ9
SUM OF ALL RESPONSES TO PHQ9 QUESTIONS 1 & 2: 0
1. LITTLE INTEREST OR PLEASURE IN DOING THINGS: 0
SUM OF ALL RESPONSES TO PHQ QUESTIONS 1-9: 0
SUM OF ALL RESPONSES TO PHQ QUESTIONS 1-9: 0
2. FEELING DOWN, DEPRESSED OR HOPELESS: 0
SUM OF ALL RESPONSES TO PHQ QUESTIONS 1-9: 0
SUM OF ALL RESPONSES TO PHQ QUESTIONS 1-9: 0

## 2022-03-09 ASSESSMENT — LIFESTYLE VARIABLES: HOW OFTEN DO YOU HAVE A DRINK CONTAINING ALCOHOL: NEVER

## 2022-03-09 NOTE — TELEPHONE ENCOUNTER
Patient informed.   Patient states that she was reading up on the medication and it said 3 to 5, patient states that she will take it for a full 3 years and see how she is feeling

## 2022-03-09 NOTE — PATIENT INSTRUCTIONS
Personalized Preventive Plan for [facility] - 3/9/2022  Medicare offers a range of preventive health benefits. Some of the tests and screenings are paid in full while other may be subject to a deductible, co-insurance, and/or copay. Some of these benefits include a comprehensive review of your medical history including lifestyle, illnesses that may run in your family, and various assessments and screenings as appropriate. After reviewing your medical record and screening and assessments performed today your provider may have ordered immunizations, labs, imaging, and/or referrals for you. A list of these orders (if applicable) as well as your Preventive Care list are included within your After Visit Summary for your review. Other Preventive Recommendations:    · A preventive eye exam performed by an eye specialist is recommended every 1-2 years to screen for glaucoma; cataracts, macular degeneration, and other eye disorders. · A preventive dental visit is recommended every 6 months. · Try to get at least 150 minutes of exercise per week or 10,000 steps per day on a pedometer . · Order or download the FREE \"Exercise & Physical Activity: Your Everyday Guide\" from [employer]. Call 8-789.205.8051 or search [employer] online. · You need 0774-9473 mg of calcium and 9818-7650 IU of vitamin D per day. It is possible to meet your calcium requirement with diet alone, but a vitamin D supplement is usually necessary to meet this goal.  · When exposed to the sun, use a sunscreen that protects against both UVA and UVB radiation with an SPF of 30 or greater. Reapply every 2 to 3 hours or after sweating, drying off with a towel, or swimming. · Always wear a seat belt when traveling in a car. Always wear a helmet when riding a bicycle or motorcycle.

## 2022-03-09 NOTE — TELEPHONE ENCOUNTER
The patient calls in and states she was seen in office today with Dr. Leigh Schwab. During the visit the patient forgot to ask   Dr Leigh Schwab a question. The patient wants to stop taking her Alendronate. The patient would like to have Dr. Flakito Thompson opinion on stopping Alendronate. Please advise.

## 2022-03-11 ENCOUNTER — TELEPHONE (OUTPATIENT)
Dept: FAMILY MEDICINE CLINIC | Age: 82
End: 2022-03-11

## 2022-03-11 DIAGNOSIS — G89.29 CHRONIC BILATERAL LOW BACK PAIN WITH RIGHT-SIDED SCIATICA: Primary | ICD-10-CM

## 2022-03-11 DIAGNOSIS — M54.41 CHRONIC BILATERAL LOW BACK PAIN WITH RIGHT-SIDED SCIATICA: Primary | ICD-10-CM

## 2022-03-11 NOTE — TELEPHONE ENCOUNTER
----- Message from Aviva Trent sent at 3/11/2022  2:47 PM EST -----  Subject: Message to Provider    QUESTIONS  Information for Provider? pt has a referral for PT but she is unsure of   which one to choose she is looking in the [facility] area and   would like a recommendation on who to make an appt with.   ---------------------------------------------------------------------------  --------------  CALL BACK INFO  What is the best way for the office to contact you? OK to leave message on   voicemail  Preferred Call Back Phone Number? [phone]  ---------------------------------------------------------------------------  --------------  SCRIPT ANSWERS  Relationship to Patient?  Self

## 2022-03-17 ENCOUNTER — TELEPHONE (OUTPATIENT)
Dept: FAMILY MEDICINE CLINIC | Age: 82
End: 2022-03-17

## 2022-03-17 NOTE — TELEPHONE ENCOUNTER
Referral placed to [facility] physical therapy on 3/11/2022    Call [phone]

## 2022-03-17 NOTE — TELEPHONE ENCOUNTER
Pt said at her recent appt with Dr Mira Kowalski they discussed PT for her right hip pain. Pt is asking if she needs a referral and where to go?

## 2022-03-22 ENCOUNTER — HOSPITAL ENCOUNTER (OUTPATIENT)
Dept: PHYSICAL THERAPY | Age: 82
Setting detail: THERAPIES SERIES
Discharge: HOME | End: 2022-03-22
Payer: COMMERCIAL

## 2022-03-22 PROCEDURE — 97140 MANUAL THERAPY 1/> REGIONS: CPT

## 2022-03-22 PROCEDURE — 97110 THERAPEUTIC EXERCISES: CPT

## 2022-03-22 PROCEDURE — 97161 PT EVAL LOW COMPLEX 20 MIN: CPT

## 2022-03-22 NOTE — FLOWSHEET NOTE
[facility]  Phone: [phone]   Fax: [phone]    Physical Therapy Daily Treatment Note  Date:  3/22/2022    Patient Name:  Dyana Cervantes    :  1940  MRN: [id number]  Referring Physician:  Dr. Lyric Patterson                                 Evaluation Date: 3/22/2022                                         Medical Diagnosis Information:        M54.41, G89.29 (ICD-10-CM) - Chronic bilateral low back pain with right-sided sciatica         Treatment diagnosis: pain, decreased lumbar spine AROM, decreased LE AROM, decreased flexibility, decreased endurance limiting functional mobility                               Insurance information:  [employer] $40 copay, visits based on [place], no auth required        Physician Information:   Dr. Zaina Velasco of care signed (Y/N): N    Date of Patient follow up with Physician: ? Functional scale:LEFS: raw score =  55; dysfunction = 31%    Progress Report: []  Yes  [x]  No     Date Range for reporting period:  Beginning 3/22/22  Ending    Progress report due (10 Rx/or 30 days whichever is less): visit #10 or 21     Recertification due (POC duration/ or 90 days whichever is less): visit #20 or 22     Visit # Insurance Allowable Auth required? Date Range    [place] []  Yes  [x]  No         Units approved Units used Date Range          Latex Allergy:  [x]NO      []YES  Preferred Language for Healthcare:   [x]English       []other:      Pain level:  2/10     SUBJECTIVE:  Patient stated complaint: Pt states that she went to her PCP a couple weeks ago for her annual visit and mentioned intermittent R hip/buttock pain. The pain comes and goes. When it is bad, it makes her limp. 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 Pt had some N/T R ankle, \"but that is practically gone away. \"       OBJECTIVE: See eval      RESTRICTIONS/PRECAUTIONS: h/o melanoma, osteoporosis    Exercises/Interventions:     Therapeutic Exercises (28139) Resistance / level Sets/sec Reps Notes   NuStep add      HSS on step  30sec 2xR/L    IB calf str/HR  30sec/1set 2x/10x           Mat ex: hip abd in hooklying  SLR lime 1  1 10x  10x           Piriformis str add      bridges add             Therapeutic Activities (39540)       Discussion of POC, HEP 5min                                  Neuromuscular Re-ed (19542)                                                 Manual Intervention (77090)              R ITB STM with roller 8min   Pt in L SLying, painful during STM but felt better immediately afterwards                                   Modalities: None this date     Pt. Education:  -patient educated on diagnosis, prognosis and expectations for rehab  -all patient questions were answered    HEP instruction: Written HEP instructions provided and reviewed   Access Code: [email or web address]  URL: [email or web address]  Date: 03/22/2022  Prepared by: Margarito Agustin     Exercises  Small Range Straight Leg Raise - 2 x daily - 7 x weekly - 1 sets - 10 reps  Hooklying Clamshell with Resistance - 2 x daily - 7 x weekly - 1 sets - 10 reps  Standing Hamstring Stretch with Step - 2 x daily - 7 x weekly - 1 sets - 2 reps - 30sec hold  Standing Gastroc Stretch on Step - 2 x daily - 7 x weekly - 1 sets - 2 reps - 30sec hold       Therapeutic Exercise and NMR EXR  [x] (50767) Provided verbal/tactile cueing for activities related to strengthening, flexibility, endurance, ROM for improvements in  [x] LE / Lumbar: LE, proximal hip, and core control with self care, mobility, lifting, ambulation. [] UE / Cervical: cervical, postural, scapular, scapulothoracic and UE control with self care, reaching, carrying, lifting, house/yardwork, driving, computer work.   [x] (94507) Provided verbal/tactile cueing for activities related to improving balance, coordination, kinesthetic sense, posture, motor skill, proprioception to assist with   [x] LE / lumbar: LE, proximal hip, and core control in self care, mobility, lifting, ambulation and eccentric single leg control. [] UE / cervical: cervical, scapular, scapulothoracic and UE control with self care, reaching, carrying, lifting, house/yardwork, driving, computer work.   [] (51353) Therapist is in constant attendance of 2 or more patients providing skilled therapy interventions, but not providing any significant amount of measurable one-on-one time to either patient, for improvements in  [] LE / lumbar: LE, proximal hip, and core control in self care, mobility, lifting, ambulation and eccentric single leg control. [] UE / cervical: cervical, scapular, scapulothoracic and UE control with self care, reaching, carrying, lifting, house/yardwork, driving, computer work. NMR and Therapeutic Activities:    [x] (24568 or 85795) Provided verbal/tactile cueing for activities related to improving balance, coordination, kinesthetic sense, posture, motor skill, proprioception and motor activation to allow for proper function of   [x] LE: / Lumbar core, proximal hip and LE with self care and ADLs  [] UE / Cervical: cervical, postural, scapular, scapulothoracic and UE control with self care, carrying, lifting, driving, computer work.   [] (64022) Gait Re-education- Provided training and instruction to the patient for proper LE, core and proximal hip recruitment and positioning and eccentric body weight control with ambulation re-education including up and down stairs     Home Management Training / Self Care:  [] (66224) Provided self-care/home management training related to activities of daily living and compensatory training, and/or use of adaptive equipment for improvement with: ADLs and compensatory training, meal preparation, safety procedures and instruction in use of adaptive equipment, including bathing, grooming, dressing, personal hygiene, basic household cleaning and chores.      Home Exercise Program:    [x] (34114) Reviewed/Progressed HEP activities related to strengthening, flexibility, endurance, ROM of   [x] LE / Lumbar: core, proximal hip and LE for functional self-care, mobility, lifting and ambulation/stair navigation   [] UE / Cervical: cervical, postural, scapular, scapulothoracic and UE control with self care, reaching, carrying, lifting, house/yardwork, driving, computer work  [] (98267)Reviewed/Progressed HEP activities related to improving balance, coordination, kinesthetic sense, posture, motor skill, proprioception of   [] LE: core, proximal hip and LE for self care, mobility, lifting, and ambulation/stair navigation    [] UE / Cervical: cervical, postural,  scapular, scapulothoracic and UE control with self care, reaching, carrying, lifting, house/yardwork, driving, computer work    Manual Treatments:  PROM / STM / Oscillations-Mobs:  G-I, II, III, IV (PA's, Inf., Post.)  [x] (73358) Provided manual therapy to mobilize LE, proximal hip and/or LS spine soft tissue/joints for the purpose of modulating pain, promoting relaxation,  increasing ROM, reducing/eliminating soft tissue swelling/inflammation/restriction, improving soft tissue extensibility and allowing for proper ROM for normal function with   [x] LE / lumbar: self care, mobility, lifting and ambulation. [] UE / Cervical: self care, reaching, carrying, lifting, house/yardwork, driving, computer work. Modalities:  [] (12647) Vasopneumatic compression: Utilized vasopneumatic compression to decrease edema / swelling for the purpose of improving mobility and quad tone / recruitment which will allow for increased overall function including but not limited to self-care, transfers, ambulation, and ascending / descending stairs.        Charges:  Timed Code Treatment Minutes: 30   Total Treatment Minutes: 50     [x] EVAL - LOW (76934)   [] EVAL - MOD (58981)  [] EVAL - HIGH (32057)  [] RE-EVAL (00699)  [x] EW(69573) x  1 [] Ionto  [] NMR (83697) x       [] Vaso  [x] Manual (57695) x  1     [] Ultrasound  [] TA x        [] Mech Traction (06613)  [] Aquatic Therapy x     [] ES (un) (04505):   [] Home Management Training x  [] ES(attended) (54027)   [] Dry Needling 1-2 muscles (55463):  [] Dry Needling 3+ muscles (300352  [] Group:      [] Other:     GOALS:  Patient stated goal: no pain  []? Progressing: []? Met: []? Not Met: []? Adjusted     Therapist goals for Patient:   Short Term Goals: To be achieved in: 2 weeks  1. Independent in HEP and progression per patient tolerance, in order to prevent re-injury. []? Progressing: []? Met: []? Not Met: []? Adjusted  2. Patient will have a decrease in pain to facilitate improvement in movement, function, and ADLs as indicated by Functional Deficits. []? Progressing: []? Met: []? Not Met: []? Adjusted     Long Term Goals: To be achieved in: 6 weeks  1. Disability index score of 20% or less for the SPIKE to assist with reaching prior level of function. []? Progressing: []? Met: []? Not Met: []? Adjusted  2. Patient will demonstrate increased AROM to WNL, good LS mobility, good hip ROM to allow for proper joint functioning as indicated by patients Functional Deficits. []? Progressing: []? Met: []? Not Met: []? Adjusted  3. Patient will demonstrate an increase in Strength to 4/5 B LE and good core activation to allow for proper functional mobility as indicated by patients Functional Deficits. []? Progressing: []? Met: []? Not Met: []? Adjusted  4. Patient will return to functional activities including going grocery shopping without increased symptoms or restriction. []? Progressing: []? Met: []? Not Met: []? Adjusted  5. Pt will be able to walk for exercise without significant LBP. []? Progressing: []? Met: []? Not Met: []?  Adjusted            Overall Progression Towards Functional goals/ Treatment Progress Update:  [] Patient is progressing as expected towards functional goals listed. [] Progression is slowed due to complexities/Impairments listed. [] Progression has been slowed due to co-morbidities. [x] Plan just implemented, too soon to assess goals progression <30days   [] Goals require adjustment due to lack of progress  [] Patient is not progressing as expected and requires additional follow up with physician  [] Other    Persisting Functional Limitations/Impairments:  [x]Sleeping []Sitting               []Standing []Transfers        [x]Walking []Kneeling               []Stairs []Squatting / bending   []ADLs []Reaching  []Lifting  []Housework  []Driving []Job related tasks  []Sports/Recreation []Other:        ASSESSMENT:  See eval  Treatment/Activity Tolerance:  [x] Patient able to complete tx [] Patient limited by fatigue  [x] Patient limited by pain  [] Patient limited by other medical complications  [] Other:     Prognosis: [x] Good [] Fair  [] Poor    Patient Requires Follow-up: [x] Yes  [] No    PLAN: Begin PT focusing on: proximal hip mobilizations, LB mobs, LB core activation, proximal hip activation, and HEP     Frequency/Duration:  2 days per week for 3-6 Weeks:  Interventions:  [x]? Therapeutic exercise including: strength training, ROM, for LE, Glutes and core   [x]? NMR activation and proprioception for glutes , LE and Core   [x]? Manual therapy as indicated for Hip complex, LE and spine to include: Dry Needling/IASTM, STM, PROM, Gr I-IV mobilizations, manipulation. [x]? Modalities as needed that may include: thermal agents, E-stim, Biofeedback, US, iontophoresis as indicated  [x]?   Patient education on joint protection, postural re-education, activity modification, progression of HEP.     [] Continue per plan of care [] Alter current plan (see comments)  [x] Plan of care initiated [] Hold pending MD visit [] Discharge    Electronically signed by: Sylvain Guevara, PT , MPT [id number]  Note: If patient does not return for scheduled/ recommended follow up visits, this note will serve as a discharge from care along with most recent update on progress.

## 2022-03-22 NOTE — PLAN OF CARE
[address] [address]  Phone: [phone]   Fax: [phone]     Shayne Myers    Dear Dr. Mona Elkins  ,    We had the pleasure of evaluating the following patient for physical therapy services at [address] [place] [address]. A summary of our findings can be found in the initial assessment below. This includes our plan of care. If you have any questions or concerns regarding these findings, please do not hesitate to contact me at the office phone number checked above. Thank you for the referral.       Physician Signature:_______________________________Date:__________________  By signing above (or electronic signature), therapists plan is approved by physician      Patient: Brenda Sotomayor   : 1940   MRN: [id number]  Referring Physician:  Dr. Mona Elkins     Evaluation Date: 3/22/2022      Medical Diagnosis Information:    M54.41, G89.29 (ICD-10-CM) - Chronic bilateral low back pain with right-sided sciatica         Treatment diagnosis: pain, decreased lumbar spine AROM, decreased LE AROM, decreased flexibility, decreased endurance limiting functional mobility                               Insurance information:  [employer] $40 copay, visits based on MN, no auth required     Precautions/ Contra-indications: h/o melanoma, osteoporosis  Latex Allergy:  [x]NO      []YES  Preferred Language for Healthcare:   [x]English       []Other:    C-SSRS Triggered by Intake questionnaire (Past 2 wk assessment ):   [x] No, Questionnaire did not trigger screening.   [] Yes, Patient intake triggered C-SSRS Screening     [] Completed, no further action required. [] Completed, PCP notified via Epic    SUBJECTIVE: Patient stated complaint: Pt states that she went to her PCP a couple weeks ago for her annual visit and mentioned intermittent R hip/buttock pain. The pain comes and goes. When it is bad, it makes her limp.   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 Pt had some N/T R ankle, \"but that is practically gone away. 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    Living Status/Prior Level of Function: Prior to this injury / incident, pt was independent with ADLs and IADLs, indep, no difficulty per pt    OBJECTIVE:   Palpation: TTP R greater trochanter    Functional Mobility/Transfers: indep    Posture: wfl, slightly forward flexed posture    Gait: (include devices/WB status) wfl    Bandages/Dressings/Incisions: NA    Dermatomes Normal Abnormal Comments   inguinal area (L1)  x     anterior mid-thigh (L2) x     distal ant thigh/med knee (L3) x     medial lower leg and foot (L4) x     lateral lower leg and foot (L5) x     posterior calf (S1) x     medial calcaneus (S2) x         ROM  Comments   Lumbar Flex 0-50    Lumbar Ext 0-5      ROM LEFT RIGHT Comments   Lumbar Side Bend 0 0    Lumbar Rotation 0-15 0-20    Hip Flexion 0-120* 0-120* *pain B   Hip Abd      Hip ER      Hip IR 0-40 0-40* *pain R   Hip Extension      Knee Ext      Knee Flex      Hamstring Flex -50 -40* *pain R   Piriformis                      Joint mobility: Lspine, hamstring flexibility   []Normal    [x]Hypo   []Hyper      Strength / Myotomes LEFT RIGHT Comments   Multifidus      Transverse Ab      Hip Flexors (L1-2) 5 3    Hip Abductors 5 3    Hip Extensors      Hip Internal Rotators      Hip External Rotators      Quads (L2-4) 3* 4 *pain   Hamstrings  5 3    Ankle Plantarflexion (S1-2)      Ankle Dorsiflexion (L4-5)      Ankle Inversion      Ankle Eversion (S1-2)      Great Toe Extension (L5)          Orthopedic Special Tests:    Normal Abnormal N/A Comments   Toe walk   x      Heel Walk x      Fwd Bend-aberrant or innominate mvmt) x      Trendelenburg x      Kemps/Quadrant       Stork       ARABELLA/Fabiano  x  B   Hip scour       SLR       Crossed SLR       Supine to sit       Hip thrust       SI distraction/compression       PA/Spring       Prone Instability test       Prone knee bend       Sacral Spring/thrust                  [x] Patient history, allergies, meds reviewed. Medical chart reviewed. See intake form. Review Of Systems (ROS):  [x]Performed Review of systems (Integumentary, CardioPulmonary, Neurological) by intake and observation. Intake form has been scanned into medical record. Patient has been instructed to contact their primary care physician regarding ROS issues if not already being addressed at this time.       Co-morbidities/Complexities (which will affect course of rehabilitation):   []None        []Hx of COVID   Arthritic conditions   []Rheumatoid arthritis (M05.9)  []Osteoarthritis (M19.91)  []Gout   Cardiovascular conditions   []Hypertension (I10)  []Hyperlipidemia (E78.5)  []Angina pectoris (I20)  []Atherosclerosis (I70)  []Pacemaker  []Hx of CABG/stent/  cardiac surgeries   Musculoskeletal conditions   []Disc pathology   []Congenital spine pathologies   [x]Osteoporosis (M81.8)  []Osteopenia (M85.8)  []Scoliosis       Endocrine conditions   []Hypothyroid (E03.9)  []Hyperthyroid Gastrointestinal conditions   []Constipation (N94.52)   Metabolic conditions   []Morbid obesity (E66.01)  []Diabetes type 1(E10.65) or 2 (E11.65)   []Neuropathy (G60.9)     Cardio/Pulmonary conditions   []Asthma (J45)  []Coughing   []COPD (J44.9)  []CHF  []A-fib   Psychological Disorders  []Anxiety (F41.9)  []Depression (F32.9)   []Other:   Developmental Disorders  []Autism (F84.0)  []CP (G80)  []Down Syndrome (Q90.9)  []Developmental delay     Neurological conditions  []Prior Stroke (I69.30)  []Parkinson's (G20)  []Encephalopathy (G93.40)  []MS (G35)  []Post-polio (G14)  []SCI  []TBI  []ALS Other conditions  []Fibromyalgia (M79.7)  []Vertigo  []Syncope  []Kidney Failure  []Cancer      []currently undergoing                treatment  []Pregnancy  []Incontinence   Prior surgeries  []involved limb  []previous spinal surgery  [] section birth  []hysterectomy  []bowel / bladder surgery  []other relevant surgeries   [x]Other: h/o melanoma             Barriers to/and or personal factors that will affect rehab potential:              [x]Age  []Sex    []Smoker              [x]Motivation/Lack of Motivation                        []Co-Morbidities              []Cognitive Function, education/learning barriers              []Environmental, home barriers              []profession/work barriers  []past PT/medical experience  []other:  Justification:     Falls Risk Assessment (30 days):   [x] Falls Risk assessed and no intervention required.   [] Falls Risk assessed and Patient requires intervention due to being higher risk   TUG score (>12s at risk):     [] Falls education provided, including         ASSESSMENT:   Functional Impairments:     [x]Noted lumbar/proximal hip hypomobility   []Noted lumbosacral and/or generalized hypermobility   [x]Decreased Lumbosacral/hip/LE functional ROM   [x]Decreased core/proximal hip strength and neuromuscular control    [x]Decreased LE functional strength    []Abnormal reflexes/sensation/myotomal/dermatomal deficits  [x]Reduced balance/proprioceptive control    []other:      Functional Activity Limitations (from functional questionnaire and intake)   [x]Reduced ability to tolerate prolonged functional positions   [x]Reduced ability or difficulty with changes of positions or transfers between positions   [x]Reduced ability to maintain good posture and demonstrate good body mechanics with sitting, bending, and lifting   [x]Reduced ability to sleep   [x] Reduced ability or tolerance with driving and/or computer work   [x]Reduced ability to perform lifting, reaching, carrying tasks   [x]Reduced ability to squat   [x]Reduced ability to forward bend   [x]Reduced ability to ambulate prolonged functional periods/distances/surfaces   []Reduced ability to ascend/descend stairs   []other:       Participation Restrictions   [x]Reduced participation in self care activities   [x]Reduced participation in home management activities   []Reduced participation in work activities   [x]Reduced participation in social activities. []Reduced participation in sport/recreational activities. Classification:   []Signs/symptoms consistent with Lumbar instability/stabilization subgroup. []Signs/symptoms consistent with Lumbar mobilization/manipulation subgroup, myotomes and dermatomes intact. Meets manipulation criteria. []Signs/symptoms consistent with Lumbar direction specific/centralization subgroup   []Signs/symptoms consistent with Lumbar traction subgroup     [x]Signs/symptoms consistent with lumbar facet dysfunction   [x]Signs/symptoms consistent with lumbar stenosis type dysfunction   []Signs/symptoms consistent with nerve root involvement including myotome & dermatome dysfunction   []Signs/symptoms consistent with post-surgical status including: decreased ROM, strength and function.    []signs/symptoms consistent with pathology which may benefit from Dry needling     [x]other:  signs/symptoms consistent with R greater trochanteric bursitis, hip impingement    Prognosis/Rehab Potential:      []Excellent   [x]Good    []Fair   []Poor    Tolerance of evaluation/treatment:    []Excellent   [x]Good    []Fair   []Poor     Physical Therapy Evaluation Complexity Justification  [x] A history of present problem with:  [] no personal factors and/or comorbidities that impact the plan of care;  [x]1-2 personal factors and/or comorbidities that impact the plan of care  []3 personal factors and/or comorbidities that impact the plan of care  [x] An examination of body systems using standardized tests and measures addressing any of the following: body structures and functions (impairments), activity limitations, and/or participation restrictions;:  [x] a total of 1-2 or more elements   [] a total of 3 or more elements   [] a total of 4 or more elements   [x] A clinical presentation with:  [x] stable and/or uncomplicated characteristics   [] evolving clinical presentation with changing characteristics  [] unstable and unpredictable characteristics;   [x] Clinical decision making of [x] low, [] moderate, [] high complexity using standardized patient assessment instrument and/or measurable assessment of functional outcome. [x] EVAL (LOW) 05617 (typically 15 minutes face-to-face)  [] EVAL (MOD) 24574 (typically 30 minutes face-to-face)  [] EVAL (HIGH) 26181 (typically 45 minutes face-to-face)  [] RE-EVAL     PLAN: Begin PT focusing on: proximal hip mobilizations, LB mobs, LB core activation, proximal hip activation, and HEP    Frequency/Duration:  2 days per week for 4-6 Weeks:  Interventions:  [x]  Therapeutic exercise including: strength training, ROM, for LE, Glutes and core   [x]  NMR activation and proprioception for glutes , LE and Core   [x]  Manual therapy as indicated for Hip complex, LE and spine to include: Dry Needling/IASTM, STM, PROM, Gr I-IV mobilizations, manipulation. [x]  Modalities as needed that may include: thermal agents, E-stim, Biofeedback, US, iontophoresis as indicated  [x]  Patient education on joint protection, postural re-education, activity modification, progression of HEP.     HEP instruction: Written HEP instructions provided and reviewed   Access Code: [facility]  URL:

## 2022-03-29 ENCOUNTER — HOSPITAL ENCOUNTER (OUTPATIENT)
Dept: PHYSICAL THERAPY | Age: 82
Setting detail: THERAPIES SERIES
Discharge: HOME | End: 2022-03-29
Payer: COMMERCIAL

## 2022-03-29 PROCEDURE — 97110 THERAPEUTIC EXERCISES: CPT

## 2022-03-29 PROCEDURE — 97140 MANUAL THERAPY 1/> REGIONS: CPT

## 2022-03-29 NOTE — FLOWSHEET NOTE
[facility] Physical Therapy  Phone: [phone]   Fax: [phone]    Physical Therapy Daily Treatment Note  Date:  3/29/2022    Patient Name:  Tahira Mckeon    :  1940  MRN: [id number]  Referring Physician:  Dr. Li Payment                                 Evaluation Date: 3/22/2022                                         Medical Diagnosis Information:        M54.41, G89.29 (ICD-10-CM) - Chronic bilateral low back pain with right-sided sciatica         Treatment diagnosis: pain, decreased lumbar spine AROM, decreased LE AROM, decreased flexibility, decreased endurance limiting functional mobility                               Insurance information:  [employer] $40 copay, visits based on [place], no auth required        Physician Information:   Dr. Ronel Bo of care signed (Y/N): N    Date of Patient follow up with Physician: ? Functional scale:LEFS: raw score =  55; dysfunction = 31%    Progress Report: []  Yes  [x]  No     Date Range for reporting period:  Beginning 3/22/22  Ending    Progress report due (10 Rx/or 30 days whichever is less): visit #10 or      Recertification due (POC duration/ or 90 days whichever is less): visit #20 or 22     Visit # Insurance Allowable Auth required? Date Range   2/6 [place] []  Yes  [x]  No         Units approved Units used Date Range          Latex Allergy:  [x]NO      []YES  Preferred Language for Healthcare:   [x]English       []other:      Pain level:  2/10     SUBJECTIVE:  \"During the night, when I'm in bed, my hip really hurts. I have to move to get it to stop. \" Pt states most of the time that she is up and about, it is pretty good. Pt states she was lying on the floor to do hip abd and SLR, but that it was difficult to get up from the floor. PT suggested pt do them on her bed.       HISTORY: Patient stated complaint: Pt states that she went to her PCP a couple weeks ago for her annual visit and mentioned intermittent R hip/buttock pain. The pain comes and goes. When it is bad, it makes her limp. 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 Pt had some N/T R ankle, \"but that is practically gone away. \"       OBJECTIVE: See eval      RESTRICTIONS/PRECAUTIONS: h/o melanoma, osteoporosis    Exercises/Interventions:     Therapeutic Exercises (66763) Resistance / level Sets/sec Reps Notes   NuStep 5min   For warm up, new 3/29   HSS on step  30sec 2xR/L    IB calf str/HR  30sec/1set 2x/10x           Mat ex: hip abd in sitting  SLR lime 1  1 10x  10x Sitting vs supine this date due to pt's c/o difficulty doing ex in supine 3/29   Standing at ballet bar:  hip ext,  Hip abd         Piriformis str in sitting  30sec 2x R/L New 3/29   bridges  1 10x New 3/29          Therapeutic Activities (57106)            Discussion of using pillows during the night to minimize pain 5min                           Neuromuscular Re-ed (28695)                                                 Manual Intervention (85359)              R ITB STM with roller 10min   Pt in L SLying, painful during STM but felt better immediately afterwards                                   Modalities: None this date     Pt. Education:  -patient educated on diagnosis, prognosis and expectations for rehab  -all patient questions were answered    HEP instruction: Written HEP instructions provided and reviewed   Access Code: [facility]  URL: [email or web address]  Date: 03/29/2022  Prepared by: Brandi Carrillo    Exercises  Standing Hamstring Stretch with Step - 2 x daily - 7 x weekly - 1 sets - 2 reps - 30sec hold  Standing Gastroc Stretch on Step - 2 x daily - 7 x weekly - 1 sets - 2 reps - 30sec hold  Seated Piriformis Stretch with Trunk Bend - 2 x daily - 7 x weekly - 1 sets - 2 reps - 30sec hold  Seated Hip Abduction with Resistance - 2 x daily - 7 x weekly - 1-2 sets - 10 reps  Standing Hip Abduction with Counter Support - 2 x daily - 7 x weekly - 1-2 sets - 10 reps  Standing Hip Extension with Counter Support - 2 x daily - 7 x weekly - 1-2 sets - 10 reps         Therapeutic Exercise and NMR EXR  [x] (37097) Provided verbal/tactile cueing for activities related to strengthening, flexibility, endurance, ROM for improvements in  [x] LE / Lumbar: LE, proximal hip, and core control with self care, mobility, lifting, ambulation. [] UE / Cervical: cervical, postural, scapular, scapulothoracic and UE control with self care, reaching, carrying, lifting, house/yardwork, driving, computer work. [x] (74838) Provided verbal/tactile cueing for activities related to improving balance, coordination, kinesthetic sense, posture, motor skill, proprioception to assist with   [x] LE / lumbar: LE, proximal hip, and core control in self care, mobility, lifting, ambulation and eccentric single leg control. [] UE / cervical: cervical, scapular, scapulothoracic and UE control with self care, reaching, carrying, lifting, house/yardwork, driving, computer work.   [] (74602) Therapist is in constant attendance of 2 or more patients providing skilled therapy interventions, but not providing any significant amount of measurable one-on-one time to either patient, for improvements in  [] LE / lumbar: LE, proximal hip, and core control in self care, mobility, lifting, ambulation and eccentric single leg control. [] UE / cervical: cervical, scapular, scapulothoracic and UE control with self care, reaching, carrying, lifting, house/yardwork, driving, computer work.      NMR and Therapeutic Activities:    [x] (42471 or 88129) Provided verbal/tactile cueing for activities related to improving balance, coordination, kinesthetic sense, posture, motor skill, proprioception and motor activation to allow for proper function of   [x] LE: / Lumbar core, proximal hip and LE with self care and ADLs  [] UE / Cervical: cervical, postural, scapular, scapulothoracic and UE control with self care, carrying, lifting, driving, computer work.   [] (00276) Gait Re-education- Provided training and instruction to the patient for proper LE, core and proximal hip recruitment and positioning and eccentric body weight control with ambulation re-education including up and down stairs     Home Management Training / Self Care:  [] (28490) Provided self-care/home management training related to activities of daily living and compensatory training, and/or use of adaptive equipment for improvement with: ADLs and compensatory training, meal preparation, safety procedures and instruction in use of adaptive equipment, including bathing, grooming, dressing, personal hygiene, basic household cleaning and chores.      Home Exercise Program:    [x] (99978) Reviewed/Progressed HEP activities related to strengthening, flexibility, endurance, ROM of   [x] LE / Lumbar: core, proximal hip and LE for functional self-care, mobility, lifting and ambulation/stair navigation   [] UE / Cervical: cervical, postural, scapular, scapulothoracic and UE control with self care, reaching, carrying, lifting, house/yardwork, driving, computer work  [] (18648)Reviewed/Progressed HEP activities related to improving balance, coordination, kinesthetic sense, posture, motor skill, proprioception of   [] LE: core, proximal hip and LE for self care, mobility, lifting, and ambulation/stair navigation    [] UE / Cervical: cervical, postural,  scapular, scapulothoracic and UE control with self care, reaching, carrying, lifting, house/yardwork, driving, computer work    Manual Treatments:  PROM / STM / Oscillations-Mobs:  G-I, II, III, IV (PA's, Inf., Post.)  [x] (18995) Provided manual therapy to mobilize LE, proximal hip and/or LS spine soft tissue/joints for the purpose of modulating pain, promoting relaxation,  increasing ROM, reducing/eliminating soft tissue swelling/inflammation/restriction, improving soft tissue extensibility and allowing for proper ROM for normal function with   [x] LE / lumbar: self care, mobility, lifting and ambulation. [] UE / Cervical: self care, reaching, carrying, lifting, house/yardwork, driving, computer work. Modalities:  [] (05615) Vasopneumatic compression: Utilized vasopneumatic compression to decrease edema / swelling for the purpose of improving mobility and quad tone / recruitment which will allow for increased overall function including but not limited to self-care, transfers, ambulation, and ascending / descending stairs. Charges:  Timed Code Treatment Minutes: 43   Total Treatment Minutes: 43     [x] EVAL - LOW (46931)   [] EVAL - MOD (44732)  [] EVAL - HIGH (68844)  [] RE-EVAL (94030)  [x] JI(86936) x  2    [] Ionto  [] NMR (38446) x       [] Vaso  [x] Manual (33462) x  1     [] Ultrasound  [] TA x        [] Mech Traction (26159)  [] Aquatic Therapy x      [] ES (un) (44092):   [] Home Management Training x  [] ES(attended) (57080)   [] Dry Needling 1-2 muscles (30185):  [] Dry Needling 3+ muscles (430055  [] Group:      [] Other:     GOALS:  Patient stated goal: no pain  []? Progressing: []? Met: []? Not Met: []? Adjusted     Therapist goals for Patient:   Short Term Goals: To be achieved in: 2 weeks  1. Independent in HEP and progression per patient tolerance, in order to prevent re-injury. []? Progressing: []? Met: []? Not Met: []? Adjusted  2. Patient will have a decrease in pain to facilitate improvement in movement, function, and ADLs as indicated by Functional Deficits. []? Progressing: []? Met: []? Not Met: []? Adjusted     Long Term Goals: To be achieved in: 6 weeks  1. Disability index score of 20% or less for the SPIKE to assist with reaching prior level of function. []? Progressing: []? Met: []? Not Met: []? Adjusted  2. Patient will demonstrate increased AROM to WNL, good LS mobility, good hip ROM to allow for proper joint functioning as indicated by patients Functional Deficits.    []? Progressing: []? Met: []? Not Met: []? Adjusted  3. Patient will demonstrate an increase in Strength to 4/5 B LE and good core activation to allow for proper functional mobility as indicated by patients Functional Deficits. []? Progressing: []? Met: []? Not Met: []? Adjusted  4. Patient will return to functional activities including going grocery shopping without increased symptoms or restriction. []? Progressing: []? Met: []? Not Met: []? Adjusted  5. Pt will be able to walk for exercise without significant LBP. []? Progressing: []? Met: []? Not Met: []? Adjusted            Overall Progression Towards Functional goals/ Treatment Progress Update:  [] Patient is progressing as expected towards functional goals listed. [] Progression is slowed due to complexities/Impairments listed. [] Progression has been slowed due to co-morbidities. [x] Plan just implemented, too soon to assess goals progression <30days   [] Goals require adjustment due to lack of progress  [] Patient is not progressing as expected and requires additional follow up with physician  [] Other    Persisting Functional Limitations/Impairments:  [x]Sleeping []Sitting               []Standing []Transfers        [x]Walking []Kneeling               []Stairs []Squatting / bending   []ADLs []Reaching  []Lifting  []Housework  []Driving []Job related tasks  []Sports/Recreation []Other:        ASSESSMENT:  Pt annie new ex well this date. Pt is feeling much better - scheduled one more visit to make sure she is okay. Will consider secheduling more if needed.     Treatment/Activity Tolerance:  [x] Patient able to complete tx [] Patient limited by fatigue  [] Patient limited by pain  [] Patient limited by other medical complications  [] Other:     Prognosis: [x] Good [] Fair  [] Poor    Patient Requires Follow-up: [x] Yes  [] No    PLAN: Begin PT focusing on: proximal hip mobilizations, LB mobs, LB core activation, proximal hip activation, and HEP     Frequency/Duration:  2 days per week for 3-6 Weeks:  Interventions:  [x]? Therapeutic exercise including: strength training, ROM, for LE, Glutes and core   [x]? NMR activation and proprioception for glutes , LE and Core   [x]? Manual therapy as indicated for Hip complex, LE and spine to include: Dry Needling/IASTM, STM, PROM, Gr I-IV mobilizations, manipulation. [x]? Modalities as needed that may include: thermal agents, E-stim, Biofeedback, US, iontophoresis as indicated  [x]? Patient education on joint protection, postural re-education, activity modification, progression of HEP.     [x] Continue per plan of care [] Alter current plan (see comments)  [] Plan of care initiated [] Hold pending MD visit [] Discharge    Electronically signed by: Hermes Chew PT , MPT [id number]  Note: If patient does not return for scheduled/ recommended follow up visits, this note will serve as a discharge from care along with most recent update on progress.

## 2022-04-05 ENCOUNTER — HOSPITAL ENCOUNTER (OUTPATIENT)
Dept: PHYSICAL THERAPY | Age: 82
Setting detail: THERAPIES SERIES
Discharge: HOME | End: 2022-04-05
Payer: COMMERCIAL

## 2022-04-05 NOTE — FLOWSHEET NOTE
Physical Therapy  Cancellation/No-show Note  Patient Name:  Christian Michel  :  1940   Date:  2022  Cancelled visits to date: 1  No-shows to date: 0    Patient status for today's appointment patient:  [x]  Cancelled  []  Rescheduled appointment  []  No-show     Reason given by patient:  [x]  Patient ill  []  Conflicting appointment  []  No transportation    []  Conflict with work  []  No reason given  []  Other:     Comments:      Phone call information:   [x]  Phone call made today to patient at 9:50am at number provided:      []  Patient answered, conversation as follows:    [x]  Patient did not answer, message left as follows:PT stated that I got the message that she is sick but wanted to check on her. She does not have to call back. []  Phone call not made today  []  Phone call not needed - pt contacted us to cancel and provided reason for cancellation.      Electronically signed by:  Tamra Bray, PT

## 2022-04-12 ENCOUNTER — HOSPITAL ENCOUNTER (OUTPATIENT)
Dept: PHYSICAL THERAPY | Age: 82
Setting detail: THERAPIES SERIES
Discharge: HOME | End: 2022-04-12
Payer: COMMERCIAL

## 2022-04-12 PROCEDURE — 97110 THERAPEUTIC EXERCISES: CPT

## 2022-04-12 PROCEDURE — 97140 MANUAL THERAPY 1/> REGIONS: CPT

## 2022-04-12 NOTE — FLOWSHEET NOTE
[address] [facility]  Phone: [phone]   Fax: [phone]    Physical Therapy Daily Treatment Note  Date:  2022    Patient Name:  Lauren Sandoval    :  1940  MRN: [id number]  Referring Physician:  Dr. Madhavi Box                                 Evaluation Date: 3/22/2022                                         Medical Diagnosis Information:        M54.41, G89.29 (ICD-10-CM) - Chronic bilateral low back pain with right-sided sciatica         Treatment diagnosis: pain, decreased lumbar spine AROM, decreased LE AROM, decreased flexibility, decreased endurance limiting functional mobility                               Insurance information:  [employer] $40 copay, visits based on [place], no auth required        Physician Information:   Dr. Peewee Mistry of care signed (Y/N): N    Date of Patient follow up with Physician: ? Functional scale:LEFS: raw score =  55; dysfunction = 31%    Progress Report: []  Yes  [x]  No     Date Range for reporting period:  Beginning 3/22/22  Ending    Progress report due (10 Rx/or 30 days whichever is less): visit #10 or      Recertification due (POC duration/ or 90 days whichever is less): visit #20 or 22     Visit # Insurance Allowable Auth required? Date Range   3/6 [place] []  Yes  [x]  No         Units approved Units used Date Range          Latex Allergy:  [x]NO      []YES  Preferred Language for Healthcare:   [x]English       []other:      Pain level:  0/10     SUBJECTIVE: Pt states that she really is not having pain anymore. Pt was sick but is recovered now. HISTORY: Patient stated complaint: Pt states that she went to her PCP a couple weeks ago for her annual visit and mentioned intermittent R hip/buttock pain. The pain comes and goes. When it is bad, it makes her limp. 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   Pt had some N/T R ankle, \"but that is practically gone away.\"       OBJECTIVE: See eval      RESTRICTIONS/PRECAUTIONS: h/o melanoma, osteoporosis    Exercises/Interventions:     Therapeutic Exercises (35012) Resistance / level Sets/sec Reps Notes   NuStep 5min   For warm up, new 3/29   HSS on step  30sec 2xR/L    IB calf str/HR  30sec/1set 2x/10x           Mat ex: hip abd in sitting  SLR lime 2  1 10x  10x 4/12: increased sets   Standing at ballet bar:  hip ext,  Hip abd     With glider  \"   1  1   10x  10x New 4/12   Piriformis str insupine  30sec 2x R/L New 3/29; supine 4/12   bridges  1 10x New 3/29          Therapeutic Activities (19846)                                      Neuromuscular Re-ed (02212)                                                 Manual Intervention (48209)              R ITB STM with roller 10min   Pt in L SLying, painful during STM but felt better immediately afterwards                                   Modalities: None this date     Pt. Education:  -patient educated on diagnosis, prognosis and expectations for rehab  -all patient questions were answered    HEP instruction: Written HEP instructions provided and reviewed   Access Code: [email or web address]  URL: [email or web address]  Date: 03/29/2022  Prepared by: Alexa Dawson    Exercises  Standing Hamstring Stretch with Step - 2 x daily - 7 x weekly - 1 sets - 2 reps - 30sec hold  Standing Gastroc Stretch on Step - 2 x daily - 7 x weekly - 1 sets - 2 reps - 30sec hold  Seated Piriformis Stretch with Trunk Bend - 2 x daily - 7 x weekly - 1 sets - 2 reps - 30sec hold  Seated Hip Abduction with Resistance - 2 x daily - 7 x weekly - 1-2 sets - 10 reps  Standing Hip Abduction with Counter Support - 2 x daily - 7 x weekly - 1-2 sets - 10 reps  Standing Hip Extension with Counter Support - 2 x daily - 7 x weekly - 1-2 sets - 10 reps         Therapeutic Exercise and NMR EXR  [x] (09764) Provided verbal/tactile cueing for activities related to strengthening, flexibility, endurance, ROM for improvements in  [x] LE / Lumbar: LE, proximal hip, and core control with self care, mobility, lifting, ambulation. [] UE / Cervical: cervical, postural, scapular, scapulothoracic and UE control with self care, reaching, carrying, lifting, house/yardwork, driving, computer work. [x] (97972) Provided verbal/tactile cueing for activities related to improving balance, coordination, kinesthetic sense, posture, motor skill, proprioception to assist with   [x] LE / lumbar: LE, proximal hip, and core control in self care, mobility, lifting, ambulation and eccentric single leg control. [] UE / cervical: cervical, scapular, scapulothoracic and UE control with self care, reaching, carrying, lifting, house/yardwork, driving, computer work.   [] (15159) Therapist is in constant attendance of 2 or more patients providing skilled therapy interventions, but not providing any significant amount of measurable one-on-one time to either patient, for improvements in  [] LE / lumbar: LE, proximal hip, and core control in self care, mobility, lifting, ambulation and eccentric single leg control. [] UE / cervical: cervical, scapular, scapulothoracic and UE control with self care, reaching, carrying, lifting, house/yardwork, driving, computer work.      NMR and Therapeutic Activities:    [x] (44209 or 41045) Provided verbal/tactile cueing for activities related to improving balance, coordination, kinesthetic sense, posture, motor skill, proprioception and motor activation to allow for proper function of   [x] LE: / Lumbar core, proximal hip and LE with self care and ADLs  [] UE / Cervical: cervical, postural, scapular, scapulothoracic and UE control with self care, carrying, lifting, driving, computer work.   [] (79324) Gait Re-education- Provided training and instruction to the patient for proper LE, core and proximal hip recruitment and positioning and eccentric body weight control with ambulation re-education including up and down stairs Home Management Training / Self Care:  [] (74483) Provided self-care/home management training related to activities of daily living and compensatory training, and/or use of adaptive equipment for improvement with: ADLs and compensatory training, meal preparation, safety procedures and instruction in use of adaptive equipment, including bathing, grooming, dressing, personal hygiene, basic household cleaning and chores. Home Exercise Program:    [x] (39720) Reviewed/Progressed HEP activities related to strengthening, flexibility, endurance, ROM of   [x] LE / Lumbar: core, proximal hip and LE for functional self-care, mobility, lifting and ambulation/stair navigation   [] UE / Cervical: cervical, postural, scapular, scapulothoracic and UE control with self care, reaching, carrying, lifting, house/yardwork, driving, computer work  [] (24174)Reviewed/Progressed HEP activities related to improving balance, coordination, kinesthetic sense, posture, motor skill, proprioception of   [] LE: core, proximal hip and LE for self care, mobility, lifting, and ambulation/stair navigation    [] UE / Cervical: cervical, postural,  scapular, scapulothoracic and UE control with self care, reaching, carrying, lifting, house/yardwork, driving, computer work    Manual Treatments:  PROM / STM / Oscillations-Mobs:  G-I, II, III, IV (PA's, Inf., Post.)  [x] (33656) Provided manual therapy to mobilize LE, proximal hip and/or LS spine soft tissue/joints for the purpose of modulating pain, promoting relaxation,  increasing ROM, reducing/eliminating soft tissue swelling/inflammation/restriction, improving soft tissue extensibility and allowing for proper ROM for normal function with   [x] LE / lumbar: self care, mobility, lifting and ambulation. [] UE / Cervical: self care, reaching, carrying, lifting, house/yardwork, driving, computer work.      Modalities:  [] (77129) Vasopneumatic compression: Utilized vasopneumatic compression to decrease edema / swelling for the purpose of improving mobility and quad tone / recruitment which will allow for increased overall function including but not limited to self-care, transfers, ambulation, and ascending / descending stairs. Charges:  Timed Code Treatment Minutes: 39   Total Treatment Minutes: 39     [] EVAL - LOW (83941)   [] EVAL - MOD (10960)  [] EVAL - HIGH (65997)  [] RE-EVAL (27495)  [x] CH(19628) x  2    [] Ionto  [] NMR (13669) x       [] Vaso  [x] Manual (38346) x  1     [] Ultrasound  [] TA x        [] Mech Traction (41616)  [] Aquatic Therapy x      [] ES (un) (45520):   [] Home Management Training x  [] ES(attended) (65515)   [] Dry Needling 1-2 muscles (06468):  [] Dry Needling 3+ muscles (989161  [] Group:      [] Other:     GOALS:  Patient stated goal: no pain  []? Progressing: []? Met: []? Not Met: []? Adjusted     Therapist goals for Patient:   Short Term Goals: To be achieved in: 2 weeks  1. Independent in HEP and progression per patient tolerance, in order to prevent re-injury. []? Progressing: []? Met: []? Not Met: []? Adjusted  2. Patient will have a decrease in pain to facilitate improvement in movement, function, and ADLs as indicated by Functional Deficits. []? Progressing: []? Met: []? Not Met: []? Adjusted     Long Term Goals: To be achieved in: 6 weeks  1. Disability index score of 20% or less for the SPIKE to assist with reaching prior level of function. []? Progressing: []? Met: []? Not Met: []? Adjusted  2. Patient will demonstrate increased AROM to WNL, good LS mobility, good hip ROM to allow for proper joint functioning as indicated by patients Functional Deficits. []? Progressing: []? Met: []? Not Met: []? Adjusted  3. Patient will demonstrate an increase in Strength to 4/5 B LE and good core activation to allow for proper functional mobility as indicated by patients Functional Deficits. []? Progressing: []? Met: []? Not Met: []? Adjusted  4.  Patient will return to functional activities including going grocery shopping without increased symptoms or restriction. []? Progressing: []? Met: []? Not Met: []? Adjusted  5. Pt will be able to walk for exercise without significant LBP. []? Progressing: []? Met: []? Not Met: []? Adjusted            Overall Progression Towards Functional goals/ Treatment Progress Update:  [] Patient is progressing as expected towards functional goals listed. [] Progression is slowed due to complexities/Impairments listed. [] Progression has been slowed due to co-morbidities. [x] Plan just implemented, too soon to assess goals progression <30days   [] Goals require adjustment due to lack of progress  [] Patient is not progressing as expected and requires additional follow up with physician  [] Other    Persisting Functional Limitations/Impairments:  [x]Sleeping []Sitting               []Standing []Transfers        [x]Walking []Kneeling               []Stairs []Squatting / bending   []ADLs []Reaching  []Lifting  []Housework  []Driving []Job related tasks  []Sports/Recreation []Other:        ASSESSMENT:  Pt annie new ex of hip ext/abd with glider well this date. Pt cont to feel - did not schedule any more appt at this time. PT will touch base via phone to see how she is doing. If she wants to schedule one-two more visits at that time, we will do that. Otherwise, will d/c with HEP. Treatment/Activity Tolerance:  [x] Patient able to complete tx [] Patient limited by fatigue  [] Patient limited by pain  [] Patient limited by other medical complications  [] Other:     Prognosis: [x] Good [] Fair  [] Poor    Patient Requires Follow-up: [x] Yes  [] No    PLAN: Begin PT focusing on: proximal hip mobilizations, LB mobs, LB core activation, proximal hip activation, and HEP     Frequency/Duration:  2 days per week for 3-6 Weeks:  Interventions:  [x]? Therapeutic exercise including: strength training, ROM, for LE, Glutes and core   [x]? NMR activation and proprioception for glutes , LE and Core   [x]? Manual therapy as indicated for Hip complex, LE and spine to include: Dry Needling/IASTM, STM, PROM, Gr I-IV mobilizations, manipulation. [x]? Modalities as needed that may include: thermal agents, E-stim, Biofeedback, US, iontophoresis as indicated  [x]? Patient education on joint protection, postural re-education, activity modification, progression of HEP.     [x] Continue per plan of care [] Alter current plan (see comments)  [] Plan of care initiated [] Hold pending MD visit [] Discharge    Electronically signed by: Juana Salcedo, PT , MPT [id number]  Note: If patient does not return for scheduled/ recommended follow up visits, this note will serve as a discharge from care along with most recent update on progress.

## 2022-04-26 ENCOUNTER — HOSPITAL ENCOUNTER (OUTPATIENT)
Dept: PHYSICAL THERAPY | Age: 82
Setting detail: THERAPIES SERIES
Discharge: HOME | End: 2022-04-26
Payer: COMMERCIAL

## 2022-04-26 PROCEDURE — 97530 THERAPEUTIC ACTIVITIES: CPT

## 2022-04-26 PROCEDURE — 97110 THERAPEUTIC EXERCISES: CPT

## 2022-04-26 PROCEDURE — 97140 MANUAL THERAPY 1/> REGIONS: CPT

## 2022-04-26 NOTE — DISCHARGE SUMMARY
spine.  Pt had some N/T R ankle, \"but that is practically gone away. \"       OBJECTIVE: See eval      RESTRICTIONS/PRECAUTIONS: h/o melanoma, osteoporosis    Exercises/Interventions:     Therapeutic Exercises (19859) Resistance / level Sets/sec Reps Notes   NuStep 5min   For warm up, new 3/29   HSS on step  30sec 2xR/L    IB calf str/HR  30sec/1set 2x/10x           Mat ex: hip abd in sitting  SLR lime 2  1 10x  10x 4/12: increased sets   Standing at ballet bar:  hip ext,  Hip abd     With glider  \"   1  1   10x R/L  10x R/L New 4/12 4/29:Pt reports this caused a lot of pain, pt educ not to do this if it causes painbridges  1 10x New 3/29          Therapeutic Activities (88914)                        Review of HEP with HO 10min   Pt is without questions. PT gave pt contact info if questions arise. Pt was educ to lie on bed for SLR and bridges. She can use a washcloth or small towel under foot on kitchen floor for hip abd/ext to mimic glider          Neuromuscular Re-ed (67488)                                                 Manual Intervention ([id number])              R ITB STM with roller 15min   Pt in L SLying, painful during STM but felt better immediately afterwards                                   Modalities: None this date     Pt. Education:  -patient educated on diagnosis, prognosis and expectations for rehab  -all patient questions were answered    HEP instruction: Written HEP instructions provided and reviewed   Access Code: [facility] [employer]  URL: [email or web address]  Date: 04/26/2022  Prepared by: Carolyn Avitia    Exercises  Standing Hamstring Stretch with Step - 1-2 x daily - 7 x weekly - 1 sets - 2 reps - 30sec hold  Standing Gastroc Stretch on Step - 1-2 x daily - 7 x weekly - 1 sets - 2 reps - 30sec hold  Seated Hip Abduction with Resistance - 1-2 x daily - 7 x weekly - 2 sets - 10 reps  Standing Hip Abduction with Counter Support - 1-2 x daily - 7 x weekly - 2 sets - 10 reps  Standing Hip Extension with Counter Support - 1-2 x daily - 7 x weekly - 2 sets - 10 reps  Supine Bridge - 1-2 x daily - 7 x weekly - 2 sets - 10 reps  Small Range Straight Leg Raise - 1-2 x daily - 7 x weekly - 2 sets - 10 reps           Therapeutic Exercise and NMR EXR  [x] (30951) Provided verbal/tactile cueing for activities related to strengthening, flexibility, endurance, ROM for improvements in  [x] LE / Lumbar: LE, proximal hip, and core control with self care, mobility, lifting, ambulation. [] UE / Cervical: cervical, postural, scapular, scapulothoracic and UE control with self care, reaching, carrying, lifting, house/yardwork, driving, computer work. [x] (16317) Provided verbal/tactile cueing for activities related to improving balance, coordination, kinesthetic sense, posture, motor skill, proprioception to assist with   [x] LE / lumbar: LE, proximal hip, and core control in self care, mobility, lifting, ambulation and eccentric single leg control. [] UE / cervical: cervical, scapular, scapulothoracic and UE control with self care, reaching, carrying, lifting, house/yardwork, driving, computer work.   [] (48951) Therapist is in constant attendance of 2 or more patients providing skilled therapy interventions, but not providing any significant amount of measurable one-on-one time to either patient, for improvements in  [] LE / lumbar: LE, proximal hip, and core control in self care, mobility, lifting, ambulation and eccentric single leg control. [] UE / cervical: cervical, scapular, scapulothoracic and UE control with self care, reaching, carrying, lifting, house/yardwork, driving, computer work.      NMR and Therapeutic Activities:    [x] (72298 or 59744) Provided verbal/tactile cueing for activities related to improving balance, coordination, kinesthetic sense, posture, motor skill, proprioception and motor activation to allow for proper function of   [x] LE: / Lumbar core, proximal hip and LE with self care and ADLs  [] UE / Cervical: cervical, postural, scapular, scapulothoracic and UE control with self care, carrying, lifting, driving, computer work.   [] (17679) Gait Re-education- Provided training and instruction to the patient for proper LE, core and proximal hip recruitment and positioning and eccentric body weight control with ambulation re-education including up and down stairs     Home Management Training / Self Care:  [] (97108) Provided self-care/home management training related to activities of daily living and compensatory training, and/or use of adaptive equipment for improvement with: ADLs and compensatory training, meal preparation, safety procedures and instruction in use of adaptive equipment, including bathing, grooming, dressing, personal hygiene, basic household cleaning and chores.      Home Exercise Program:    [x] (60980) Reviewed/Progressed HEP activities related to strengthening, flexibility, endurance, ROM of   [x] LE / Lumbar: core, proximal hip and LE for functional self-care, mobility, lifting and ambulation/stair navigation   [] UE / Cervical: cervical, postural, scapular, scapulothoracic and UE control with self care, reaching, carrying, lifting, house/yardwork, driving, computer work  [] (31497)Reviewed/Progressed HEP activities related to improving balance, coordination, kinesthetic sense, posture, motor skill, proprioception of   [] LE: core, proximal hip and LE for self care, mobility, lifting, and ambulation/stair navigation    [] UE / Cervical: cervical, postural,  scapular, scapulothoracic and UE control with self care, reaching, carrying, lifting, house/yardwork, driving, computer work    Manual Treatments:  PROM / STM / Oscillations-Mobs:  G-I, II, III, IV (PA's, Inf., Post.)  [x] (20823) Provided manual therapy to mobilize LE, proximal hip and/or LS spine soft tissue/joints for the purpose of modulating pain, promoting relaxation,  increasing ROM, reducing/eliminating soft tissue swelling/inflammation/restriction, improving soft tissue extensibility and allowing for proper ROM for normal function with   [x] LE / lumbar: self care, mobility, lifting and ambulation. [] UE / Cervical: self care, reaching, carrying, lifting, house/yardwork, driving, computer work. Modalities:  [] (88683) Vasopneumatic compression: Utilized vasopneumatic compression to decrease edema / swelling for the purpose of improving mobility and quad tone / recruitment which will allow for increased overall function including but not limited to self-care, transfers, ambulation, and ascending / descending stairs. Charges:  Timed Code Treatment Minutes: 55   Total Treatment Minutes: 55     [] EVAL - LOW (00782)   [] EVAL - MOD (00697)  [] EVAL - HIGH (49616)  [] RE-EVAL (56842)  [x] PV(01013) x  2    [] Ionto  [] NMR (64357) x       [] Vaso  [x] Manual (76944) x  1     [] Ultrasound  [x] TA x 1       [] Mech Traction (58641)  [] Aquatic Therapy x      [] ES (un) (82842):   [] Home Management Training x  [] ES(attended) (42322)   [] Dry Needling 1-2 muscles (10488):  [] Dry Needling 3+ muscles (540055  [] Group:      [] Other:     GOALS:  Patient stated goal: no pain  []? Progressing: []? Met: []? Not Met: []? Adjusted     Therapist goals for Patient:   Short Term Goals: To be achieved in: 2 weeks  1. Independent in HEP and progression per patient tolerance, in order to prevent re-injury. []? Progressing: [x]? Met: []? Not Met: []? Adjusted  2. Patient will have a decrease in pain to facilitate improvement in movement, function, and ADLs as indicated by Functional Deficits. []? Progressing: [x]? Met: []? Not Met: []? Adjusted     Long Term Goals: To be achieved in: 6 weeks  1. Disability index score of 20% or less for the SPIKE to assist with reaching prior level of function. 4/26: SPIKE 10% disability  []? Progressing: [x]? Met: []? Not Met: []? Adjusted  2.  Patient will demonstrate increased AROM to WNL, good LS mobility, good hip ROM to allow for proper joint functioning as indicated by patients Functional Deficits. 4/26: Pt demo good AROM LE and Lspine throughout functional mobility  []? Progressing: [x]? Met: []? Not Met: []? Adjusted  3. Patient will demonstrate an increase in Strength to 4/5 B LE and good core activation to allow for proper functional mobility as indicated by patients Functional Deficits. 4/26: 4+/5 MMT throughout B hip  []? Progressing: [x]? Met: []? Not Met: []? Adjusted  4. Patient will return to functional activities including going grocery shopping without increased symptoms or restriction. 4/29: Pt reports she was able to grocery shop without pain. []? Progressing: [x]? Met: []? Not Met: []? Adjusted  5. Pt will be able to walk for exercise without significant LBP.     4/29: Pt states she has not gone outside for a walk - just when shopping  [x]? Progressing: []? Met: []? Not Met: []? Adjusted            Overall Progression Towards Functional goals/ Treatment Progress Update:  [] Patient is progressing as expected towards functional goals listed. [] Progression is slowed due to complexities/Impairments listed. [] Progression has been slowed due to co-morbidities. [x] Plan just implemented, too soon to assess goals progression <30days   [] Goals require adjustment due to lack of progress  [] Patient is not progressing as expected and requires additional follow up with physician  [] Other    Persisting Functional Limitations/Impairments:  [x]Sleeping []Sitting               []Standing []Transfers        [x]Walking []Kneeling               []Stairs []Squatting / bending   []ADLs []Reaching  []Lifting  []Housework  []Driving []Job related tasks  []Sports/Recreation []Other:        ASSESSMENT:  Pt met 4/5 LTG's. Will d/c from PT with HEP at this time.     Treatment/Activity Tolerance:  [x] Patient able to complete tx [] Patient limited by fatigue  [] Patient limited by pain  [] Patient limited by other medical complications  [] Other:     Prognosis: [x] Good [] Fair  [] Poor    Patient Requires Follow-up: [] Yes  [x] No    PLAN: D/C with HEP     Frequency/Duration:  2 days per week for 3-6 Weeks:  Interventions:  [x]? Therapeutic exercise including: strength training, ROM, for LE, Glutes and core   [x]? NMR activation and proprioception for glutes , LE and Core   [x]? Manual therapy as indicated for Hip complex, LE and spine to include: Dry Needling/IASTM, STM, PROM, Gr I-IV mobilizations, manipulation. [x]? Modalities as needed that may include: thermal agents, E-stim, Biofeedback, US, iontophoresis as indicated  [x]? Patient education on joint protection, postural re-education, activity modification, progression of HEP.     [] Continue per plan of care [] Alter current plan (see comments)  [] Plan of care initiated [] Hold pending MD visit [x] Discharge     Electronically signed by: Lizzeth Amaya PT , MPT [id number]  Note: If patient does not return for scheduled/ recommended follow up visits, this note will serve as a discharge from care along with most recent update on progress.

## 2022-06-08 ENCOUNTER — OFFICE VISIT (OUTPATIENT)
Dept: FAMILY MEDICINE CLINIC | Age: 82
End: 2022-06-08
Payer: COMMERCIAL

## 2022-06-08 VITALS
WEIGHT: 116.6 LBS | HEIGHT: 61 IN | OXYGEN SATURATION: 98 % | BODY MASS INDEX: 22.01 KG/M2 | DIASTOLIC BLOOD PRESSURE: 72 MMHG | SYSTOLIC BLOOD PRESSURE: 150 MMHG | HEART RATE: 67 BPM

## 2022-06-08 DIAGNOSIS — L82.1 SEBORRHEIC KERATOSIS: Primary | ICD-10-CM

## 2022-06-08 DIAGNOSIS — R03.0 ELEVATED BLOOD PRESSURE READING: ICD-10-CM

## 2022-06-08 PROCEDURE — 1123F ACP DISCUSS/DSCN MKR DOCD: CPT | Performed by: STUDENT IN AN ORGANIZED HEALTH CARE EDUCATION/TRAINING PROGRAM

## 2022-06-08 PROCEDURE — 99213 OFFICE O/P EST LOW 20 MIN: CPT | Performed by: STUDENT IN AN ORGANIZED HEALTH CARE EDUCATION/TRAINING PROGRAM

## 2022-06-08 ASSESSMENT — PATIENT HEALTH QUESTIONNAIRE - PHQ9
SUM OF ALL RESPONSES TO PHQ9 QUESTIONS 1 & 2: 0
1. LITTLE INTEREST OR PLEASURE IN DOING THINGS: 0
SUM OF ALL RESPONSES TO PHQ QUESTIONS 1-9: 0
2. FEELING DOWN, DEPRESSED OR HOPELESS: 0
SUM OF ALL RESPONSES TO PHQ QUESTIONS 1-9: 0

## 2022-06-08 NOTE — PROGRESS NOTES
[address] [facility] Note    Date: 6/8/2022      Assessment/Plan:     1. Seborrheic keratosis  Spots on back and chest concerning for SKs  See media tab  Reassurance provided, does not need to continue HC cream, can moisturize w/ cerave moisturizer cream  Refer to derm for regular yearly skin check  If getting worse or more popping up to let us know  - External Referral To Dermatology  - Nancy Delgado MD, Dermatology, [place]   Michael External Referral To Dermatology    2. Elevated blood pressure reading  Monitor w/ home BP checks  Borderline today but fine at home      Orders Placed This Encounter   Procedures    External Referral To Dermatology   Nancy Delgado MD, Dermatology [facility]    Michael External Referral To Dermatology     No orders of the defined types were placed in this encounter. Return if symptoms worsen or fail to improve. Discussed medication(s) risks, benefits, side effects, adverse reactions and interactions with patient. Patient voiced understanding. Subjective/Objective:     Chief Complaint   Patient presents with    Rash     on back area skin, used the cream for the back        HPI  See Assessment/Plan for further HPI info    Since the first of the year had spots on her skin that sometimes are better/worse  Hydrocortisone cream maybe helps? Not been putting it on as much bc didn't think was making a difference  They don't itch  On back and upper chest, they started popping up. No fevers or night sweats, etc. No weight loss. No bowel changes    Blood pressure is fine at home  Blood pressure 827 systolic at home  When comes to doctor BP goes up  sometimes    Wt Readings from Last 3 Encounters:   06/08/22 116 lb 9.6 oz (52.9 kg)   03/09/22 120 lb 6.4 oz (54.6 kg)   12/03/21 119 lb (54 kg)     Body mass index is 22.03 kg/m².     BP Readings from Last 3 Encounters:   06/08/22 (!) 150/72   03/09/22 138/80   12/03/21 138/70     The ASCVD Risk score (Naomi Bianchi et al., 2013) failed to calculate for the following reasons: The 2013 ASCVD risk score is only valid for ages 36 to 78    ROS: denies nausea/vomiting, fevers, chills, chest pain, shortness of breath, diarrhea, constipation, blood in the urine or stool         Patient Active Problem List   Diagnosis    Age-related osteoporosis without current pathological fracture    Elevated BP without diagnosis of hypertension     Past Medical History:   Diagnosis Date    Age-related osteoporosis without current pathological fracture 11/5/2018    Osteoporosis        Past Surgical History:   Procedure Laterality Date    HYSTERECTOMY (CERVIX STATUS UNKNOWN)  1984    OTHER SURGICAL HISTORY  2006    melanoma removed    Piter Davidson Right     Dr. Cecilia Mario       Current Outpatient Medications   Medication Sig Dispense Refill    alendronate (FOSAMAX) 10 MG tablet TAKE 1 TABLET BY MOUTH EVERY DAY      Cholecalciferol (VITAMIN D3 PO) Take by mouth 2 times daily      CALCIUM CITRATE-VITAMIN D PO Take 2 capsules by mouth 2 times daily 800 mg a day      estradiol (ESTRACE VAGINAL) 0.1 MG/GM vaginal cream Place 2 g vaginally Twice a Week       No current facility-administered medications for this visit.      No Known Allergies    Social History     Socioeconomic History    Marital status:      Spouse name: None    Number of children: None    Years of education: None    Highest education level: None   Occupational History    None   Tobacco Use    Smoking status: Never Smoker    Smokeless tobacco: Never Used   Vaping Use    Vaping Use: Never used   Substance and Sexual Activity    Alcohol use: No    Drug use: None    Sexual activity: None   Other Topics Concern    None   Social History Narrative    None     Social Determinants of Health     Financial Resource Strain: Low Risk     Difficulty of Paying Living Expenses: Not hard at all   Food Insecurity: No Food Insecurity    Worried About Running Out of Food in the Last Year: Never true    Brian of Food in the Last Year: Never true   Transportation Needs:     Lack of Transportation (Medical): Not on file    Lack of Transportation (Non-Medical):  Not on file   Physical Activity: Insufficiently Active    Days of Exercise per Week: 2 days    Minutes of Exercise per Session: 20 min   Stress:     Feeling of Stress : Not on file   Social Connections:     Frequency of Communication with Friends and Family: Not on file    Frequency of Social Gatherings with Friends and Family: Not on file    Attends Spiritism Services: Not on file    Active Member of Clubs or Organizations: Not on file    Attends Club or Organization Meetings: Not on file    Marital Status: Not on file   Intimate Partner Violence:     Fear of Current or Ex-Partner: Not on file    Emotionally Abused: Not on file    Physically Abused: Not on file    Sexually Abused: Not on file   Housing Stability:     Unable to Pay for Housing in the Last Year: Not on file    Number of [place] in the Last Year: Not on file    Unstable Housing in the Last Year: Not on file     Family History   Problem Relation Age of Onset    Cancer Mother     Lung Cancer Father     Coronary Art Dis Brother          Vitals:  BP (!) 150/72   Pulse 67   Ht 5' 1\" (1.549 m)   Wt 116 lb 9.6 oz (52.9 kg)   SpO2 98%   BMI 22.03 kg/m²     Physical Exam   General:  Well-appearing, no acute distress, alert, non-toxic  HEENT:  Normocephalic, atraumatic, without lymphadenopathy, EOMI, neck supple  Cardiovascular: normal heart rate, normal rhythm, no murmurs, rubs or gallops  Respiratory: normal breath sounds, good air movement, no respiratory distress, no wheezing, rales or rhonchi  GI: bowel sounds normal, soft, non-distended, no tenderness, no masses or peritoneal signs  Extremities: intact distal pulses, warm, dry, well perfused, without clubbing, cyanosis or edema, normal movement of all extremities. No joint swelling, deformity or tenderness. Skin:  No rash other than seborrheic keratosis on upper back and chest, warm and dry  PSYCH:  alert and oriented x 3; normal affect  NEURO:  cranial nerves intact/exam non focal, normal motor function, normal sensory function, normal speech, no gross focal deficits noted, gait within normal    Valerie Lima MD    6/8/2022 11:22 AM    Documentation was done using voice recognition dragon software. Every effort was made to ensure accuracy; however, inadvertent, unintentional computerized transcription errors may be present.

## 2022-06-08 NOTE — PATIENT INSTRUCTIONS
[facility]  [address] [facility], [address] [facility], 727 [facility]  100 [facility]  400 [facility] [address] [place] [address], [address] [place] [address]  75 [facility]  (multiple locations)  [phone]    Dermatology [facility].  [place] [address]  [phone]

## 2022-07-14 ENCOUNTER — TELEPHONE (OUTPATIENT)
Dept: FAMILY MEDICINE CLINIC | Age: 82
End: 2022-07-14

## 2022-07-14 NOTE — TELEPHONE ENCOUNTER
----- Message from [facility] SANDY Urbina sent at 7/14/2022 12:00 PM EDT -----  Subject: Appointment Request    Reason for Call: Established Patient Appointment needed: Routine (Patient   Request) No Script    QUESTIONS    Reason for appointment request? Available appointments did not meet   patient need     Additional Information for Provider? The pt called and stated that she has   been having a reoccurring issue with puffiness on the right corner of her   lip. The pt did see a dentist about this and was referred to see her pcp   or a dermatologist. The pt is open to seeing a dermatologist at this time   if its not a long time from now.  The pt stated that its not painful or red   just puffy.  ---------------------------------------------------------------------------  --------------  Deanne Daniels INFO  [phone]; OK to leave message on voicemail  ---------------------------------------------------------------------------  --------------  SCRIPT ANSWERS  DAVIDID Screen: Francine Ferrera

## 2022-07-20 NOTE — TELEPHONE ENCOUNTER
The patient calls in and states that her right upper lip puffiness has resolved. The patient is asking if she should continue to ice it and use vitamin E on the lip. The patient would also like to know if   Dr. Kaz Vasquez would like to see her in the office. Please advise.

## 2022-07-28 NOTE — PROGRESS NOTES
Magaly London is a 79 y.o. female. HPI:  Here for BP check , BP at home = good   Tolerating low dose alendronate for osteoporosis  Mouth lesion is healing, saw her dentist, still using some vitamin E oil      Meds, vitamins and allergies reviewed with pt    Recommend she get copy [employer] to us    Wt Readings from Last 3 Encounters:   07/29/22 119 lb 3.2 oz (54.1 kg)   06/08/22 116 lb 9.6 oz (52.9 kg)   03/09/22 120 lb 6.4 oz (54.6 kg)       REVIEW OF SYSTEMS:   CONSTITUTIONAL: See history of present illness,   Weight noted   HEENT: No new vision difficulties or ringing in the ears. RESPIRATORY: No new SOB, PND, orthopnea or cough. CARDIOVASCULAR: no CP, palpitations or SOB with exertion  GI: No nausea, vomiting, diarrhea, constipation, abdominal pain or changes in bowel habits. : No urinary frequency, urgency, incontinence hematuria or dysuria. SKIN: No cyanosis or skin lesions. MUSCULOSKELETAL: No new muscle or joint pain. NEUROLOGICAL: No syncope or TIA-like symptoms. PSYCHIATRIC: No anxiety, insomnia or depression     No Known Allergies    Prior to Visit Medications    Medication Sig Taking?  Authorizing Provider   alendronate (FOSAMAX) 10 MG tablet TAKE 1 TABLET BY MOUTH EVERY DAY Yes Historical Provider, MD   Cholecalciferol (VITAMIN D3 PO) Take by mouth 2 times daily Yes Historical Provider, MD   CALCIUM CITRATE-VITAMIN D PO Take 2 capsules by mouth 2 times daily 800 mg a day Yes Historical Provider, MD   estradiol (ESTRACE) 0.1 MG/GM vaginal cream Place 2 g vaginally Twice a Week Yes Historical Provider, MD       Past Medical History:   Diagnosis Date    Age-related osteoporosis without current pathological fracture 11/5/2018    Osteoporosis        Social History     Tobacco Use    Smoking status: Never    Smokeless tobacco: Never   Substance Use Topics    Alcohol use: No       Family History   Problem Relation Age of Onset    Cancer Mother     Lung Cancer Father     Coronary Art Dis Brother        OBJECTIVE:  /70   Pulse 72   Resp 16   Ht 5' 1\" (1.549 m)   Wt 119 lb 3.2 oz (54.1 kg)   SpO2 98%   BMI 22.52 kg/m²   GEN:  in NAD, thin white female  HEENT:  NCAT  NECK:  Supple without adenopathy. CV:  Regular rate and rhythm, S1 and S2 normal, no murmurs appreciated  PULM:  Chest is clear, no wheezing ,  symmetric air entry throughout both lung fields. ABD: Soft, NT  EXT: No rash or edema  NEURO: Alert oriented ×3, nonfocal, no assistive device, likely some early memory loss    ASSESSMENT/PLAN:  1. Age-related osteoporosis without current pathological fracture  Tolerating low-dose Fosamax daily, continue    2.  White coat syndrome without hypertension  Good blood pressure reading today  Reassure    Follow-up every 6 months with Dr. Kaz Vasquez or as needed    20 Total Minutes spent pre charting (reviewing problem list, meds, any test results, consultant and hospital notes ) and  obtaining present visit history, performing appropriate medical exam/evaluation, counseling and educating the patient (and family), ordering medications ,tests, and procedures as needed, refilling medication(s), placing referral(s) when needed in addition to coordinating care for this patient and documenting in electronic health record

## 2022-07-29 ENCOUNTER — OFFICE VISIT (OUTPATIENT)
Dept: FAMILY MEDICINE CLINIC | Age: 82
End: 2022-07-29
Payer: COMMERCIAL

## 2022-07-29 VITALS
BODY MASS INDEX: 22.51 KG/M2 | HEART RATE: 72 BPM | HEIGHT: 61 IN | WEIGHT: 119.2 LBS | RESPIRATION RATE: 16 BRPM | DIASTOLIC BLOOD PRESSURE: 70 MMHG | SYSTOLIC BLOOD PRESSURE: 115 MMHG | OXYGEN SATURATION: 98 %

## 2022-07-29 DIAGNOSIS — R03.0 WHITE COAT SYNDROME WITHOUT HYPERTENSION: Primary | ICD-10-CM

## 2022-07-29 DIAGNOSIS — M81.0 AGE-RELATED OSTEOPOROSIS WITHOUT CURRENT PATHOLOGICAL FRACTURE: ICD-10-CM

## 2022-07-29 PROCEDURE — 1123F ACP DISCUSS/DSCN MKR DOCD: CPT | Performed by: FAMILY MEDICINE

## 2022-07-29 PROCEDURE — 99213 OFFICE O/P EST LOW 20 MIN: CPT | Performed by: FAMILY MEDICINE

## 2022-08-02 ENCOUNTER — TELEPHONE (OUTPATIENT)
Dept: FAMILY MEDICINE CLINIC | Age: 82
End: 2022-08-02

## 2022-08-02 NOTE — TELEPHONE ENCOUNTER
----- Message from Rhoda Gitelman sent at 8/2/2022  9:24 AM EDT -----  Subject: Message to Provider    QUESTIONS  Information for Provider?  Pt would like to stop by the office to  a   paper copy of her recent office visit?  ---------------------------------------------------------------------------  --------------  [facility]  [phone]; OK to leave message on voicemail  ---------------------------------------------------------------------------  --------------  SCRIPT ANSWERS  undefined

## 2022-09-26 ENCOUNTER — TELEPHONE (OUTPATIENT)
Dept: FAMILY MEDICINE CLINIC | Age: 82
End: 2022-09-26

## 2022-09-26 NOTE — TELEPHONE ENCOUNTER
----- Message from MANDY SANDY Urbina sent at 9/26/2022  1:23 PM EDT -----  Subject: Message to Provider    QUESTIONS  Information for Provider? The pt called and stated that she is changing   Dentist and would like to know if there is someone that her provider would   recommend and someone in the same area. ---------------------------------------------------------------------------  --------------  Edward Ramirez CT  [phone]; OK to leave message on voicemail  ---------------------------------------------------------------------------  --------------  SCRIPT ANSWERS  Relationship to Patient?  Self

## 2022-11-14 ENCOUNTER — APPOINTMENT (OUTPATIENT)
Dept: CT IMAGING | Age: 82
DRG: 329 | End: 2022-11-14
Payer: COMMERCIAL

## 2022-11-14 ENCOUNTER — HOSPITAL ENCOUNTER (INPATIENT)
Age: 82
LOS: 14 days | Discharge: NURSING, REHAB OR LONG-TERM CARE | DRG: 329 | End: 2022-11-28
Attending: PEDIATRICS | Admitting: PEDIATRICS
Payer: COMMERCIAL

## 2022-11-14 ENCOUNTER — APPOINTMENT (OUTPATIENT)
Dept: GENERAL RADIOLOGY | Age: 82
DRG: 329 | End: 2022-11-14
Payer: COMMERCIAL

## 2022-11-14 ENCOUNTER — TELEPHONE (OUTPATIENT)
Dept: FAMILY MEDICINE CLINIC | Age: 82
End: 2022-11-14

## 2022-11-14 DIAGNOSIS — K56.609 SBO (SMALL BOWEL OBSTRUCTION) (HCC): Primary | ICD-10-CM

## 2022-11-14 DIAGNOSIS — K56.609 INTESTINAL OBSTRUCTION, UNSPECIFIED CAUSE, UNSPECIFIED WHETHER PARTIAL OR COMPLETE (HCC): ICD-10-CM

## 2022-11-14 LAB
A/G RATIO: 1.6 (ref 1.1–2.2)
ALBUMIN SERPL-MCNC: 4.4 G/DL (ref 3.4–5)
ALP BLD-CCNC: 63 U/L (ref 40–129)
ALT SERPL-CCNC: 11 U/L (ref 10–40)
ANION GAP SERPL CALCULATED.3IONS-SCNC: 13 MMOL/L (ref 3–16)
AST SERPL-CCNC: 29 U/L (ref 15–37)
BASOPHILS ABSOLUTE: 0.1 K/UL (ref 0–0.2)
BASOPHILS RELATIVE PERCENT: 0.5 %
BILIRUB SERPL-MCNC: 0.3 MG/DL (ref 0–1)
BUN BLDV-MCNC: 12 MG/DL (ref 7–20)
CALCIUM SERPL-MCNC: 10.1 MG/DL (ref 8.3–10.6)
CHLORIDE BLD-SCNC: 101 MMOL/L (ref 99–110)
CO2: 24 MMOL/L (ref 21–32)
CREAT SERPL-MCNC: 0.7 MG/DL (ref 0.6–1.2)
EOSINOPHILS ABSOLUTE: 0 K/UL (ref 0–0.6)
EOSINOPHILS RELATIVE PERCENT: 0.1 %
GFR SERPL CREATININE-BSD FRML MDRD: >60 ML/MIN/{1.73_M2}
GLUCOSE BLD-MCNC: 154 MG/DL (ref 70–99)
HCT VFR BLD CALC: 40.4 % (ref 36–48)
HEMOGLOBIN: 13.3 G/DL (ref 12–16)
LACTIC ACID, SEPSIS: 2.1 MMOL/L (ref 0.4–1.9)
LACTIC ACID, SEPSIS: 2.5 MMOL/L (ref 0.4–1.9)
LIPASE: 24 U/L (ref 13–60)
LYMPHOCYTES ABSOLUTE: 1.5 K/UL (ref 1–5.1)
LYMPHOCYTES RELATIVE PERCENT: 14.4 %
MCH RBC QN AUTO: 30.8 PG (ref 26–34)
MCHC RBC AUTO-ENTMCNC: 33 G/DL (ref 31–36)
MCV RBC AUTO: 93.4 FL (ref 80–100)
MONOCYTES ABSOLUTE: 0.4 K/UL (ref 0–1.3)
MONOCYTES RELATIVE PERCENT: 3.8 %
NEUTROPHILS ABSOLUTE: 8.5 K/UL (ref 1.7–7.7)
NEUTROPHILS RELATIVE PERCENT: 81.2 %
PDW BLD-RTO: 12.6 % (ref 12.4–15.4)
PLATELET # BLD: 267 K/UL (ref 135–450)
PMV BLD AUTO: 7.2 FL (ref 5–10.5)
POTASSIUM SERPL-SCNC: 3.8 MMOL/L (ref 3.5–5.1)
RBC # BLD: 4.32 M/UL (ref 4–5.2)
SODIUM BLD-SCNC: 138 MMOL/L (ref 136–145)
TOTAL PROTEIN: 7.2 G/DL (ref 6.4–8.2)
WBC # BLD: 10.5 K/UL (ref 4–11)

## 2022-11-14 PROCEDURE — 71045 X-RAY EXAM CHEST 1 VIEW: CPT

## 2022-11-14 PROCEDURE — 85025 COMPLETE CBC W/AUTO DIFF WBC: CPT

## 2022-11-14 PROCEDURE — 99285 EMERGENCY DEPT VISIT HI MDM: CPT

## 2022-11-14 PROCEDURE — 6370000000 HC RX 637 (ALT 250 FOR IP): Performed by: PHYSICIAN ASSISTANT

## 2022-11-14 PROCEDURE — 1200000000 HC SEMI PRIVATE

## 2022-11-14 PROCEDURE — 96374 THER/PROPH/DIAG INJ IV PUSH: CPT

## 2022-11-14 PROCEDURE — 96375 TX/PRO/DX INJ NEW DRUG ADDON: CPT

## 2022-11-14 PROCEDURE — 6360000002 HC RX W HCPCS: Performed by: PHYSICIAN ASSISTANT

## 2022-11-14 PROCEDURE — 93005 ELECTROCARDIOGRAM TRACING: CPT | Performed by: PHYSICIAN ASSISTANT

## 2022-11-14 PROCEDURE — 83605 ASSAY OF LACTIC ACID: CPT

## 2022-11-14 PROCEDURE — 6360000004 HC RX CONTRAST MEDICATION: Performed by: PHYSICIAN ASSISTANT

## 2022-11-14 PROCEDURE — 2580000003 HC RX 258: Performed by: PHYSICIAN ASSISTANT

## 2022-11-14 PROCEDURE — 80053 COMPREHEN METABOLIC PANEL: CPT

## 2022-11-14 PROCEDURE — 83690 ASSAY OF LIPASE: CPT

## 2022-11-14 PROCEDURE — 74177 CT ABD & PELVIS W/CONTRAST: CPT

## 2022-11-14 RX ORDER — HYDROCODONE BITARTRATE AND ACETAMINOPHEN 5; 325 MG/1; MG/1
1 TABLET ORAL ONCE
Status: DISCONTINUED | OUTPATIENT
Start: 2022-11-14 | End: 2022-11-14

## 2022-11-14 RX ORDER — HALOPERIDOL 5 MG/ML
1 INJECTION INTRAMUSCULAR ONCE
Status: COMPLETED | OUTPATIENT
Start: 2022-11-14 | End: 2022-11-14

## 2022-11-14 RX ORDER — MORPHINE SULFATE 4 MG/ML
4 INJECTION, SOLUTION INTRAMUSCULAR; INTRAVENOUS ONCE
Status: COMPLETED | OUTPATIENT
Start: 2022-11-14 | End: 2022-11-14

## 2022-11-14 RX ORDER — 0.9 % SODIUM CHLORIDE 0.9 %
1000 INTRAVENOUS SOLUTION INTRAVENOUS ONCE
Status: COMPLETED | OUTPATIENT
Start: 2022-11-14 | End: 2022-11-14

## 2022-11-14 RX ORDER — ONDANSETRON 4 MG/1
8 TABLET, ORALLY DISINTEGRATING ORAL ONCE
Status: COMPLETED | OUTPATIENT
Start: 2022-11-14 | End: 2022-11-14

## 2022-11-14 RX ORDER — ACETAMINOPHEN 325 MG/1
650 TABLET ORAL ONCE
Status: DISCONTINUED | OUTPATIENT
Start: 2022-11-14 | End: 2022-11-14

## 2022-11-14 RX ADMIN — MORPHINE SULFATE 4 MG: 4 INJECTION, SOLUTION INTRAMUSCULAR; INTRAVENOUS at 17:02

## 2022-11-14 RX ADMIN — IOPAMIDOL 75 ML: 755 INJECTION, SOLUTION INTRAVENOUS at 17:12

## 2022-11-14 RX ADMIN — HALOPERIDOL LACTATE 1 MG: 5 INJECTION, SOLUTION INTRAMUSCULAR at 17:03

## 2022-11-14 RX ADMIN — ONDANSETRON 8 MG: 4 TABLET, ORALLY DISINTEGRATING ORAL at 15:57

## 2022-11-14 RX ADMIN — SODIUM CHLORIDE 1000 ML: 9 INJECTION, SOLUTION INTRAVENOUS at 21:35

## 2022-11-14 ASSESSMENT — PAIN DESCRIPTION - PAIN TYPE: TYPE: ACUTE PAIN

## 2022-11-14 ASSESSMENT — PAIN DESCRIPTION - LOCATION
LOCATION: BACK
LOCATION: ABDOMEN
LOCATION: ABDOMEN

## 2022-11-14 ASSESSMENT — PAIN SCALES - GENERAL
PAINLEVEL_OUTOF10: 10
PAINLEVEL_OUTOF10: 2
PAINLEVEL_OUTOF10: 10

## 2022-11-14 ASSESSMENT — PAIN DESCRIPTION - DESCRIPTORS: DESCRIPTORS: DISCOMFORT

## 2022-11-14 ASSESSMENT — PAIN - FUNCTIONAL ASSESSMENT: PAIN_FUNCTIONAL_ASSESSMENT: 0-10

## 2022-11-14 ASSESSMENT — LIFESTYLE VARIABLES
HOW MANY STANDARD DRINKS CONTAINING ALCOHOL DO YOU HAVE ON A TYPICAL DAY: PATIENT DOES NOT DRINK
HOW OFTEN DO YOU HAVE A DRINK CONTAINING ALCOHOL: NEVER

## 2022-11-15 ENCOUNTER — APPOINTMENT (OUTPATIENT)
Dept: GENERAL RADIOLOGY | Age: 82
DRG: 329 | End: 2022-11-15
Payer: COMMERCIAL

## 2022-11-15 LAB
A/G RATIO: 1.6 (ref 1.1–2.2)
ALBUMIN SERPL-MCNC: 3.8 G/DL (ref 3.4–5)
ALP BLD-CCNC: 56 U/L (ref 40–129)
ALT SERPL-CCNC: 12 U/L (ref 10–40)
ANION GAP SERPL CALCULATED.3IONS-SCNC: 8 MMOL/L (ref 3–16)
APTT: 26.9 SEC (ref 23–34.3)
AST SERPL-CCNC: 22 U/L (ref 15–37)
BASOPHILS ABSOLUTE: 0.1 K/UL (ref 0–0.2)
BASOPHILS RELATIVE PERCENT: 0.5 %
BILIRUB SERPL-MCNC: 0.4 MG/DL (ref 0–1)
BUN BLDV-MCNC: 13 MG/DL (ref 7–20)
CALCIUM SERPL-MCNC: 9 MG/DL (ref 8.3–10.6)
CHLORIDE BLD-SCNC: 107 MMOL/L (ref 99–110)
CO2: 26 MMOL/L (ref 21–32)
CREAT SERPL-MCNC: 0.6 MG/DL (ref 0.6–1.2)
EKG ATRIAL RATE: 61 BPM
EKG DIAGNOSIS: NORMAL
EKG P AXIS: 58 DEGREES
EKG P-R INTERVAL: 170 MS
EKG Q-T INTERVAL: 456 MS
EKG QRS DURATION: 76 MS
EKG QTC CALCULATION (BAZETT): 459 MS
EKG R AXIS: 61 DEGREES
EKG T AXIS: 77 DEGREES
EKG VENTRICULAR RATE: 61 BPM
EOSINOPHILS ABSOLUTE: 0 K/UL (ref 0–0.6)
EOSINOPHILS RELATIVE PERCENT: 0 %
GFR SERPL CREATININE-BSD FRML MDRD: >60 ML/MIN/{1.73_M2}
GLUCOSE BLD-MCNC: 135 MG/DL (ref 70–99)
HCT VFR BLD CALC: 37 % (ref 36–48)
HEMOGLOBIN: 12.3 G/DL (ref 12–16)
INR BLD: 1.04 (ref 0.87–1.14)
LACTIC ACID: 1.6 MMOL/L (ref 0.4–2)
LYMPHOCYTES ABSOLUTE: 0.8 K/UL (ref 1–5.1)
LYMPHOCYTES RELATIVE PERCENT: 5.3 %
MAGNESIUM: 2 MG/DL (ref 1.8–2.4)
MCH RBC QN AUTO: 31.1 PG (ref 26–34)
MCHC RBC AUTO-ENTMCNC: 33.3 G/DL (ref 31–36)
MCV RBC AUTO: 93.4 FL (ref 80–100)
MONOCYTES ABSOLUTE: 0.8 K/UL (ref 0–1.3)
MONOCYTES RELATIVE PERCENT: 5.1 %
NEUTROPHILS ABSOLUTE: 13.5 K/UL (ref 1.7–7.7)
NEUTROPHILS RELATIVE PERCENT: 89.1 %
PDW BLD-RTO: 12.8 % (ref 12.4–15.4)
PHOSPHORUS: 3.3 MG/DL (ref 2.5–4.9)
PLATELET # BLD: 228 K/UL (ref 135–450)
PMV BLD AUTO: 7.3 FL (ref 5–10.5)
POTASSIUM SERPL-SCNC: 4.5 MMOL/L (ref 3.5–5.1)
PREALBUMIN: 21.7 MG/DL (ref 20–40)
PROTHROMBIN TIME: 13.5 SEC (ref 11.7–14.5)
RBC # BLD: 3.96 M/UL (ref 4–5.2)
SODIUM BLD-SCNC: 141 MMOL/L (ref 136–145)
TOTAL PROTEIN: 6.2 G/DL (ref 6.4–8.2)
TRANSFERRIN: 229 MG/DL (ref 200–360)
WBC # BLD: 15.2 K/UL (ref 4–11)

## 2022-11-15 PROCEDURE — 99222 1ST HOSP IP/OBS MODERATE 55: CPT | Performed by: SURGERY

## 2022-11-15 PROCEDURE — 6360000002 HC RX W HCPCS: Performed by: PHYSICIAN ASSISTANT

## 2022-11-15 PROCEDURE — 93010 ELECTROCARDIOGRAM REPORT: CPT | Performed by: INTERNAL MEDICINE

## 2022-11-15 PROCEDURE — 84134 ASSAY OF PREALBUMIN: CPT

## 2022-11-15 PROCEDURE — 6360000002 HC RX W HCPCS: Performed by: INTERNAL MEDICINE

## 2022-11-15 PROCEDURE — 74018 RADEX ABDOMEN 1 VIEW: CPT

## 2022-11-15 PROCEDURE — 83735 ASSAY OF MAGNESIUM: CPT

## 2022-11-15 PROCEDURE — 80053 COMPREHEN METABOLIC PANEL: CPT

## 2022-11-15 PROCEDURE — 2500000003 HC RX 250 WO HCPCS: Performed by: INTERNAL MEDICINE

## 2022-11-15 PROCEDURE — 36415 COLL VENOUS BLD VENIPUNCTURE: CPT

## 2022-11-15 PROCEDURE — 84100 ASSAY OF PHOSPHORUS: CPT

## 2022-11-15 PROCEDURE — 84466 ASSAY OF TRANSFERRIN: CPT

## 2022-11-15 PROCEDURE — 1200000000 HC SEMI PRIVATE

## 2022-11-15 PROCEDURE — 94760 N-INVAS EAR/PLS OXIMETRY 1: CPT

## 2022-11-15 PROCEDURE — 83605 ASSAY OF LACTIC ACID: CPT

## 2022-11-15 PROCEDURE — 2580000003 HC RX 258: Performed by: PHYSICIAN ASSISTANT

## 2022-11-15 PROCEDURE — 85730 THROMBOPLASTIN TIME PARTIAL: CPT

## 2022-11-15 PROCEDURE — 85610 PROTHROMBIN TIME: CPT

## 2022-11-15 PROCEDURE — 85025 COMPLETE CBC W/AUTO DIFF WBC: CPT

## 2022-11-15 RX ORDER — DIAZEPAM 5 MG/ML
5 INJECTION, SOLUTION INTRAMUSCULAR; INTRAVENOUS
Status: COMPLETED | OUTPATIENT
Start: 2022-11-15 | End: 2022-11-15

## 2022-11-15 RX ORDER — DIAZEPAM 5 MG/ML
5 INJECTION, SOLUTION INTRAMUSCULAR; INTRAVENOUS ONCE
Status: COMPLETED | OUTPATIENT
Start: 2022-11-15 | End: 2022-11-15

## 2022-11-15 RX ORDER — SODIUM CHLORIDE 9 MG/ML
INJECTION, SOLUTION INTRAVENOUS CONTINUOUS
Status: DISCONTINUED | OUTPATIENT
Start: 2022-11-15 | End: 2022-11-15

## 2022-11-15 RX ORDER — DEXTROSE, SODIUM CHLORIDE, AND POTASSIUM CHLORIDE 5; .45; .15 G/100ML; G/100ML; G/100ML
INJECTION INTRAVENOUS CONTINUOUS
Status: DISCONTINUED | OUTPATIENT
Start: 2022-11-15 | End: 2022-11-19

## 2022-11-15 RX ORDER — MORPHINE SULFATE 2 MG/ML
2 INJECTION, SOLUTION INTRAMUSCULAR; INTRAVENOUS
Status: DISCONTINUED | OUTPATIENT
Start: 2022-11-15 | End: 2022-11-19

## 2022-11-15 RX ORDER — ACETAMINOPHEN 650 MG/1
650 SUPPOSITORY RECTAL EVERY 6 HOURS PRN
Status: DISCONTINUED | OUTPATIENT
Start: 2022-11-15 | End: 2022-11-28 | Stop reason: HOSPADM

## 2022-11-15 RX ORDER — MORPHINE SULFATE 4 MG/ML
4 INJECTION, SOLUTION INTRAMUSCULAR; INTRAVENOUS
Status: DISCONTINUED | OUTPATIENT
Start: 2022-11-15 | End: 2022-11-28 | Stop reason: HOSPADM

## 2022-11-15 RX ORDER — ONDANSETRON 2 MG/ML
4 INJECTION INTRAMUSCULAR; INTRAVENOUS EVERY 6 HOURS PRN
Status: DISCONTINUED | OUTPATIENT
Start: 2022-11-15 | End: 2022-11-19

## 2022-11-15 RX ORDER — SODIUM CHLORIDE 0.9 % (FLUSH) 0.9 %
5-40 SYRINGE (ML) INJECTION EVERY 12 HOURS SCHEDULED
Status: DISCONTINUED | OUTPATIENT
Start: 2022-11-15 | End: 2022-11-28 | Stop reason: HOSPADM

## 2022-11-15 RX ORDER — ACETAMINOPHEN 325 MG/1
650 TABLET ORAL EVERY 6 HOURS PRN
Status: DISCONTINUED | OUTPATIENT
Start: 2022-11-15 | End: 2022-11-19

## 2022-11-15 RX ORDER — SODIUM CHLORIDE 9 MG/ML
INJECTION, SOLUTION INTRAVENOUS PRN
Status: DISCONTINUED | OUTPATIENT
Start: 2022-11-15 | End: 2022-11-19

## 2022-11-15 RX ORDER — SODIUM CHLORIDE 0.9 % (FLUSH) 0.9 %
5-40 SYRINGE (ML) INJECTION PRN
Status: DISCONTINUED | OUTPATIENT
Start: 2022-11-15 | End: 2022-11-28 | Stop reason: HOSPADM

## 2022-11-15 RX ORDER — ENOXAPARIN SODIUM 100 MG/ML
40 INJECTION SUBCUTANEOUS DAILY
Status: DISCONTINUED | OUTPATIENT
Start: 2022-11-16 | End: 2022-11-19

## 2022-11-15 RX ADMIN — MORPHINE SULFATE 4 MG: 4 INJECTION, SOLUTION INTRAMUSCULAR; INTRAVENOUS at 06:44

## 2022-11-15 RX ADMIN — DIAZEPAM 5 MG: 5 INJECTION, SOLUTION INTRAMUSCULAR; INTRAVENOUS at 23:12

## 2022-11-15 RX ADMIN — MORPHINE SULFATE 2 MG: 2 INJECTION, SOLUTION INTRAMUSCULAR; INTRAVENOUS at 03:31

## 2022-11-15 RX ADMIN — POTASSIUM CHLORIDE, DEXTROSE MONOHYDRATE AND SODIUM CHLORIDE: 150; 5; 450 INJECTION, SOLUTION INTRAVENOUS at 12:36

## 2022-11-15 RX ADMIN — DIAZEPAM 5 MG: 5 INJECTION, SOLUTION INTRAMUSCULAR; INTRAVENOUS at 08:34

## 2022-11-15 RX ADMIN — SODIUM CHLORIDE, PRESERVATIVE FREE 10 ML: 5 INJECTION INTRAVENOUS at 06:45

## 2022-11-15 RX ADMIN — Medication 10 ML: at 10:56

## 2022-11-15 RX ADMIN — SODIUM CHLORIDE, PRESERVATIVE FREE 10 ML: 5 INJECTION INTRAVENOUS at 03:32

## 2022-11-15 RX ADMIN — SODIUM CHLORIDE: 9 INJECTION, SOLUTION INTRAVENOUS at 02:25

## 2022-11-15 ASSESSMENT — PAIN SCALES - GENERAL
PAINLEVEL_OUTOF10: 10
PAINLEVEL_OUTOF10: 0
PAINLEVEL_OUTOF10: 3
PAINLEVEL_OUTOF10: 2
PAINLEVEL_OUTOF10: 0

## 2022-11-15 ASSESSMENT — PAIN DESCRIPTION - LOCATION
LOCATION: ABDOMEN

## 2022-11-15 ASSESSMENT — PAIN DESCRIPTION - PAIN TYPE: TYPE: ACUTE PAIN

## 2022-11-15 ASSESSMENT — LIFESTYLE VARIABLES
HOW OFTEN DO YOU HAVE A DRINK CONTAINING ALCOHOL: NEVER
HOW MANY STANDARD DRINKS CONTAINING ALCOHOL DO YOU HAVE ON A TYPICAL DAY: PATIENT DOES NOT DRINK

## 2022-11-15 ASSESSMENT — PAIN DESCRIPTION - DESCRIPTORS: DESCRIPTORS: ACHING

## 2022-11-15 NOTE — H&P
[facility] Medicine History & Physical      Patient Name: Hillis Mortimer    : 1940    PCP: Gwendolyn Croft MD    Date of Service:  Patient seen and examined on 2022     Chief Complaint:  Abdominal pain    History Of Present Illness:    Hillis Mortimer is a 79 y.o. female who presented to ED for evaluation of left sided abdominal pain which began around 11 am.  She reports associated nausea. She denies vomiting, diarrhea, constipation. She denies fever, chest pain, shortness of breath, cough, edema, urinary symptoms. She has a history of total hysterectomy. She denies history of any other abdominal surgeries. Reviewed labs and imaging performed in ED. CT concerning for partial SBO. General surgery consulted in ED and recommended pain control and NGT and will see patient tomorrow. Lactic acid slightly elevated but improved with IVF 2.5 > 2.1. Otherwise labs unremarkable. Past Medical History:    Patient has a past medical history of Age-related osteoporosis without current pathological fracture and Osteoporosis. Past Surgical History:    Patient has a past surgical history that includes other surgical history (); Hysterectomy (); and Vein Surgery (Right). Medications Prior to Admission:      Prior to Admission medications    Medication Sig Start Date End Date Taking? Authorizing Provider   alendronate (FOSAMAX) 10 MG tablet TAKE 1 TABLET BY MOUTH EVERY DAY 21   Historical Provider, MD   Cholecalciferol (VITAMIN D3 PO) Take by mouth 2 times daily    Historical Provider, MD   CALCIUM CITRATE-VITAMIN D PO Take 2 capsules by mouth 2 times daily 800 mg a day    Historical Provider, MD   estradiol (ESTRACE) 0.1 MG/GM vaginal cream Place 2 g vaginally Twice a Week    Historical Provider, MD       Allergies:  Patient has no known allergies. Social History:      TOBACCO:   reports that she has never smoked.  She has never used smokeless tobacco.  ETOH:   reports no history of alcohol use. DRUGS:  reports no history of drug use. Family History:      Reviewed in detail positive as follows:        Problem Relation Age of Onset    Cancer Mother     Lung Cancer Father     Coronary Art Dis Brother        REVIEW OF SYSTEMS:   Pertinent positives as noted in the HPI. All other systems reviewed and negative. PHYSICAL EXAM PERFORMED:    BP (!) 143/59   Pulse 79   Temp 97.5 °F (36.4 °C) (Oral)   Resp 15   Ht 5' 3\" (1.6 m)   Wt 115 lb (52.2 kg)   SpO2 100%   BMI 20.37 kg/m²     General appearance:  Awake, alert, no apparent distress  HEENT:  Normocephalic, atraumatic without obvious deformity. PERRL. EOM intact. Conjunctivae/corneas clear. Neck: Supple, with full range of motion. No JVD. Trachea midline. Respiratory:  Clear to auscultation bilaterally without rales, wheezes, or rhonchi. Normal respiratory effort. Cardiovascular:  Regular rate and rhythm without murmurs, rubs or gallops. Abdomen: +LLQ tenderness to palpation. Soft, ND, without rebound or guarding. Normal bowel sounds. Extremities:  No clubbing, cyanosis, or edema bilaterally. Full range of motion without deformity. +2 palpable pulses, equal bilaterally. Capillary refill brisk,< 3 seconds   Skin: No rashes or lesions. Warm/dry. Neurologic:  Neurovascularly intact without any focal sensory/motor deficits. Cranial nerves: II-XII intact, grossly non-focal. Alert and oriented x 3. Normal speech. Psychiatric:  Thought content appropriate, normal insight. Labs:   CBC   Recent Labs     11/14/22  1608   WBC 10.5   HGB 13.3   HCT 40.4           RENAL  Recent Labs     11/14/22  1608      K 3.8      CO2 24   BUN 12   CREATININE 0.7       LFTS  Recent Labs     11/14/22  1608   AST 29   ALT 11   BILITOT 0.3   ALKPHOS 63       COAG  No results for input(s): INR in the last 72 hours. CARDIAC ENZYMES  No results for input(s): TROPONINI in the last 72 hours.     LIPIDS  Cholesterol, Total Date/Time Value Ref Range Status   02/04/2021 07:37  (H) 0 - 199 mg/dL Final     Triglycerides   Date/Time Value Ref Range Status   02/04/2021 07:37  0 - 150 mg/dL Final     HDL   Date/Time Value Ref Range Status   02/04/2021 07:37 AM 60 40 - 60 mg/dL Final   09/19/2011 07:57 AM 48 40 - 60 mg/dl Final     LDL Calculated   Date/Time Value Ref Range Status   02/04/2021 07:37  (H) <100 mg/dL Final         Radiology:     CT ABDOMEN PELVIS W IV CONTRAST Additional Contrast? None   Final Result   1. Loops of mildly dilated fluid-filled small bowel in the pelvis which   appear to represent ileum. No clear transition point is seen. Air is still   seen distally in the colon and the proximal duodenal and jejunal loops do not   appear dilated. This likely represents a partial small bowel obstruction. 2.  Colonic diverticulosis without evidence of diverticulitis. XR CHEST PORTABLE   Final Result   No acute cardiopulmonary disease               ASSESSMENT/PLAN:    Small bowel obstruction   NG tube placed  NPO  General surgery consulted   IVF, pain control, antiemetics      DVT prophylaxis: Lovenox  Probiotic if on abx: N/A    Diet: Diet NPO  Code Status: Full Code    Consults:  IP CONSULT TO GENERAL SURGERY    Disposition: Admit to Inpatient   ELOS: Greater than two midnights due to medical therapy     Kimber Vazquez PA-C    Thank you Jorge Alexander MD for the opportunity to be involved in this patient's care. If you have any questions or concerns please feel free to contact me at [phone].

## 2022-11-15 NOTE — PROGRESS NOTES
Patient assessed in AM. Pt confused and oriented to self only. Continuously pulling at lines. Spoke with MD and pt medicated with valium. Patient calm and relaxing in bed. NG placed with no complications. Verified with KUB. NG to ILWS.  at bedside and POC discussed with Dr. Duarte Edgar and family.

## 2022-11-15 NOTE — PROGRESS NOTES
RN responded to bed alarm. Pt found standing at side of bed with her NG tube laying on the floor. MD notified.

## 2022-11-15 NOTE — ED NOTES
Attempted to place NG tube, and patient refused procedure at this time. Teaching about benefits and risk was provided, patient still refused and stated \"I can just take it out if I want to\". Patient A&O at this time.      Juma Richardson RN  11/14/22 2125       Juma Richardson RN  11/14/22 2126

## 2022-11-15 NOTE — PROGRESS NOTES
Patient seen in ED, room 24. Admission completed except for: 4 Eyes Assessment, Rights and Responsibilities, orientation to room, Plan of Care, education, white board, height and weight, pain assessment and head to toe assessment. Patient is alert and oriented X 4. Patient lives at home with her  and is being admitted for small bowel obstruction. Home Medication Reconciliation has been verbally reviewed with patient and updated as appropriate. All questions answered.

## 2022-11-15 NOTE — CONSULTS
[facility] and Laparoscopic [facility]     Consult                                                     Patient Name: Heydi Maria  MRN: [id number]  [place]: 1940  Admission date: 11/14/2022  3:43 PM   Date of evaluation: 11/15/2022  Primary Care Physician: Steph King MD  Reason for consult: Abdominal pain  History of Present Illness:    Ms. Heidy Perez is a 79 y.o. female who presents with abdominal pain nausea but without vomiting that began yesterday afternoon. The patient has some dementia, poorly able to communicate and is accompanied with the  who was able to provide some history. Additional information has been supplemented with chart review and discussion with staff. Last bowel movement yesterday morning and normal.  Denies fevers chills chest pain dyspnea dysuria change in appetite weight or other complaints. Unclear if anything made pain better or worse or radiates as patient is unable to communicate this. Surgical history includes total hysterectomy.   Work-up in emergency department concerning for partial small bowel obstruction for which NG has been placed just prior to this encounter    Past Medical History:        Diagnosis Date    Age-related osteoporosis without current pathological fracture 11/05/2018    Arthritis     Osteoporosis        Past Surgical History:        Procedure Laterality Date    COLONOSCOPY      EYE SURGERY      HYSTERECTOMY (CERVIX STATUS UNKNOWN)  1984    OTHER SURGICAL HISTORY  2006    melanoma removed     SKIN Zoya Prima Right     Dr. Ravi Long       Scheduled Meds:   sodium chloride flush  5-40 mL IntraVENous 2 times per day    [START ON 11/16/2022] enoxaparin  40 mg SubCUTAneous Daily     Continuous Infusions:   sodium chloride      sodium chloride 75 mL/hr at 11/15/22 0225     PRN Meds:.sodium chloride flush, sodium chloride, ondansetron, acetaminophen **OR** acetaminophen, morphine **OR** morphine    Prior to Admission medications Medication Sig Start Date End Date Taking? Authorizing Provider   alendronate (FOSAMAX) 10 MG tablet TAKE 1 TABLET BY MOUTH EVERY DAY  Patient not taking: Reported on 11/15/2022 1/21/21   Historical Provider, MD   Cholecalciferol (VITAMIN D3 PO) Take by mouth 2 times daily    Historical Provider, MD   CALCIUM CITRATE-VITAMIN D PO Take 2 capsules by mouth 2 times daily 800 mg a day    Historical Provider, MD   estradiol (ESTRACE) 0.1 MG/GM vaginal cream Place 2 g vaginally Twice a Week    Historical Provider, MD        Allergies:  Patient has no known allergies.     Social History:   Social History     Socioeconomic History    Marital status:      Spouse name: None    Number of children: None    Years of education: None    Highest education level: None   Tobacco Use    Smoking status: Never    Smokeless tobacco: Never   Vaping Use    Vaping Use: Never used   Substance and Sexual Activity    Alcohol use: No    Drug use: Never     Social Determinants of Health     Physical Activity: Insufficiently Active    Days of Exercise per Week: 2 days    Minutes of Exercise per Session: 20 min       Family History:    Family History   Problem Relation Age of Onset    Cancer Mother     Lung Cancer Father     Coronary Art Dis Brother        Review of Systems: unable to review, patient confused    Vital Signs:  Patient Vitals for the past 24 hrs:   BP Temp Temp src Pulse Resp SpO2 Height Weight   11/15/22 0644 -- -- -- -- 16 -- -- --   11/15/22 0600 116/62 -- -- 76 16 94 % -- --   11/15/22 0554 (!) 131/57 -- -- 80 20 93 % -- --   11/15/22 0415 (!) 112/52 -- -- 72 13 95 % -- --   11/15/22 0400 (!) 110/54 99.3 °F (37.4 °C) Oral 75 14 94 % -- --   11/15/22 0345 (!) 116/54 -- -- 73 15 93 % -- --   11/15/22 0331 -- -- -- -- 20 -- -- --   11/15/22 0330 138/64 -- -- 75 17 94 % -- --   11/15/22 0315 (!) 144/73 -- -- 95 25 100 % -- --   11/15/22 0300 (!) 129/56 -- -- 87 18 96 % -- --   11/15/22 0245 122/68 -- -- 88 18 91 % -- -- 11/15/22 0230 129/72 -- -- 96 11 98 % -- --   11/15/22 0215 (!) 122/58 -- -- 89 (!) 0 -- -- --   11/15/22 0200 (!) 140/64 -- -- 89 20 -- -- --   11/15/22 0145 (!) 121/54 -- -- 88 12 -- -- --   11/15/22 0130 (!) 139/100 -- -- 93 (!) 8 -- -- --   11/15/22 0115 126/74 -- -- 85 14 99 % -- --   11/15/22 0100 (!) 136/56 -- -- 84 19 97 % -- --   11/15/22 0045 (!) 135/59 -- -- 78 17 95 % -- --   11/15/22 0033 (!) 151/68 99.2 °F (37.3 °C) Oral 87 19 95 % -- --   22 2015 (!) 143/59 -- -- 79 15 100 % -- --   22 1900 (!) 127/94 -- -- 72 16 100 % -- --   22 1830 108/85 -- -- 82 19 100 % -- --   22 1800 (!) 141/65 -- -- 67 15 100 % -- --   22 1702 (!) 136/92 -- -- 74 18 100 % -- --   22 1540 130/63 97.5 °F (36.4 °C) Oral 59 18 100 % 5' 3\" (1.6 m) 115 lb (52.2 kg)      TEMPERATURE HISTORY 24H: Temp (24hrs), Av.7 °F (37.1 °C), Min:97.5 °F (36.4 °C), Max:99.3 °F (37.4 °C)    BLOOD PRESSURE HISTORY: Systolic (08SFV), ZQC:720 , Min:108 , EAS:917    Diastolic (31TOP), YBQ:19, Min:52, Max:100    Admission Weight: 115 lb (52.2 kg)       Intake/Output Summary (Last 24 hours) at 11/15/2022 4419  Last data filed at 11/15/2022 0416  Gross per 24 hour   Intake 1010 ml   Output --   Net 1010 ml     Drain/tube Output:         Physical Exam:  Constitutional:  well-developed, well-nourished,   Neurologic: lethargic, not oriented, minimally conversant  Psychiatric: otherwise normal affect, no hallucinations  Eyes:  sclera clear, no visible discharge  Head, ears, nose, mouth, throat: normocephalic, without obvious abnormality, supple, symmetrical, trachea midline, no JVD, mucous membranes moist  Cardiovascular: regular rate and rhythm   Respiratory: clear to auscultation  GI: soft, non-distended, mild lower abdominal tenderness without peritonitis  Lymph: no palpable lymphadenopathy  Skin: no bruising or bleeding, normal skin color, texture, turgor, and no redness, warmth, or swelling    Labs:    CBC: Recent Labs     11/14/22  1608 11/15/22  0426   WBC 10.5 15.2*   HGB 13.3 12.3   HCT 40.4 37.0    228     BMP:    Recent Labs     11/14/22  1608 11/15/22  0426    141   K 3.8 4.5    107   CO2 24 26   BUN 12 13   CREATININE 0.7 0.6   GLUCOSE 154* 135*     Hepatic:   Recent Labs     11/14/22  1608 11/15/22  0426   AST 29 22   ALT 11 12   BILITOT 0.3 0.4   ALKPHOS 63 56     Amylase: No results for input(s): AMYLASE in the last 72 hours. Lipase:   Recent Labs     11/14/22 1608   LIPASE 24.0     Mag:      Recent Labs     11/15/22  0426   MG 2.00      Phos:     Recent Labs     11/15/22  0426   PHOS 3.3      Coags:   Recent Labs     11/15/22  0426   INR 1.04   APTT 26.9       Imaging:  I have personally reviewed the following films:  CT ABDOMEN PELVIS W IV CONTRAST Additional Contrast? None    Result Date: 11/14/2022  EXAMINATION: CT OF THE ABDOMEN AND PELVIS WITH CONTRAST 11/14/2022 4:54 pm TECHNIQUE: CT of the abdomen and pelvis was performed with the administration of intravenous contrast. Multiplanar reformatted images are provided for review. Automated exposure control, iterative reconstruction, and/or weight based adjustment of the mA/kV was utilized to reduce the radiation dose to as low as reasonably achievable. COMPARISON: None. HISTORY: ORDERING SYSTEM PROVIDED HISTORY: Left sided abd pain x 5 hours. TECHNOLOGIST PROVIDED HISTORY: Reason for exam:->Left sided abd pain x 5 hours. Additional Contrast?->None Decision Support Exception - unselect if not a suspected or confirmed emergency medical condition->Emergency Medical Condition (MA) Reason for Exam: Left sided abd pain x 5 hours FINDINGS: Lower Chest: Bibasilar atelectasis. No pleural effusion or consolidation. Organs: The liver is normal.  The gallbladder is normal.  The spleen and pancreas are normal.  Adrenal glands are normal.  Simple cyst within the right kidney. No follow-up is recommended. No nephrolithiasis or hydronephrosis. GI/Bowel: The distal esophagus and stomach are normal.  There are multiple loops of mildly dilated, fluid-filled small bowel in the pelvis which appear to represent ileum. The loops measure up to 3 cm in diameter. No definite transition point is seen. There are scattered colonic diverticula without evidence of diverticulitis. No colonic wall thickening. No evidence of appendicitis. Pelvis: The urinary bladder is normal.  Status post hysterectomy. Peritoneum/Retroperitoneum: The aorta is normal caliber. No lymphadenopathy. Bones/Soft Tissues: No suspicious osseous lesions     1. Loops of mildly dilated fluid-filled small bowel in the pelvis which appear to represent ileum. No clear transition point is seen. Air is still seen distally in the colon and the proximal duodenal and jejunal loops do not appear dilated. This likely represents a partial small bowel obstruction. 2.  Colonic diverticulosis without evidence of diverticulitis. XR CHEST PORTABLE    Result Date: 11/14/2022  EXAMINATION: ONE XRAY VIEW OF THE CHEST 11/14/2022 4:27 pm COMPARISON: None. HISTORY: ORDERING SYSTEM PROVIDED HISTORY: L abd pain radiating to chest TECHNOLOGIST PROVIDED HISTORY: Reason for exam:->L abd pain radiating to chest Reason for Exam: L abd pain radiating to chest FINDINGS: No acute airspace infiltrate. No pneumothorax or pleural effusion. Normal cardiomediastinal silhouette     No acute cardiopulmonary disease       Cultures:  Relevant cultures documented under results section     Assessment:  Patient Active Problem List   Diagnosis    Age-related osteoporosis without current pathological fracture    White coat syndrome without hypertension    SBO (small bowel obstruction) (HCC)     Small bowel obstruction    Plan:  1. The patient has a small bowel obstruction that is likely from adhesive disease. Most will respond to maximal conservative measures within 2-3 days.  If she does not respond to conservative management or clinically deteriorates, may need surgical exploration  2. If not significantly better by tomorrow, will order small bowel follow through which may be both diagnostic and therapeutic  3. NG decompression, bowel rest  4. Monitor bowel function, no flatus or stool since admission  5. IVF resuscitation, monitor and replace electrolytes as needed  6. Monitor leukocytosis, does not need antibiotics from surgical standpoint  7. Pain medication and antiemetics as needed with caution as they may mask a worsening exam  8. Defer management of remainder of medical comorbidities to primary and consulting teams    This plan was discussed at length with the patient and . Thank you for the consult and allowing me to participate in the care of this patient. I look forward to following her this admission    Ki Juares MD, FACS  11/15/2022  9:23 AM

## 2022-11-15 NOTE — PROGRESS NOTES
Patient found to be off the monitor. Writer went into room and found that the patient had taken her tele leads off, pulse ox, and had removed her IV. Patient is confused and writer asked patient where she was she stated \" I'm home. \"  She was easily redirected. Patient then needed to use the restroom. Writer assisted patient to the restroom. At the beginning of the shift patient was independent with a steady gate. Patient is not steady on her feet and is now a contact guard to get to the restroom.

## 2022-11-15 NOTE — PLAN OF CARE
Problem: ABCDS Injury Assessment  Goal: Absence of physical injury  11/15/2022 0447 by Cody Lomax RN  Outcome: Progressing  11/15/2022 0358 by Cody Lomax RN  Outcome: Progressing

## 2022-11-15 NOTE — PROGRESS NOTES
Hospitalist Progress Note      PCP: Alverto Lr MD    Date of Admission: 11/14/2022    Chief Complaint: Abd pain and nausea    Hospital Course: admitted with SBO    Subjective: NG placed. Resting in bed. Feeling better. Medications:  Reviewed    Infusion Medications    sodium chloride      dextrose 5% and 0.45% NaCl with KCl 20 mEq 75 mL/hr at 11/15/22 1236     Scheduled Medications    sodium chloride flush  5-40 mL IntraVENous 2 times per day    [START ON 11/16/2022] enoxaparin  40 mg SubCUTAneous Daily     PRN Meds: sodium chloride flush, sodium chloride, ondansetron, acetaminophen **OR** acetaminophen, morphine **OR** morphine      Intake/Output Summary (Last 24 hours) at 11/15/2022 1330  Last data filed at 11/15/2022 0416  Gross per 24 hour   Intake 1010 ml   Output --   Net 1010 ml       Physical Exam Performed:    BP (!) 96/52   Pulse 77   Temp 98.7 °F (37.1 °C) (Oral)   Resp 16   Ht 5' 3\" (1.6 m)   Wt 115 lb (52.2 kg)   SpO2 95%   BMI 20.37 kg/m²     General appearance: No apparent distress, appears stated age and cooperative. NG place   HEENT: Pupils equal, round, and reactive to light. Conjunctivae/corneas clear. Neck: Supple, with full range of motion. No jugular venous distention. Trachea midline. Respiratory:  Normal respiratory effort. Clear to auscultation, bilaterally without Rales/Wheezes/Rhonchi. Cardiovascular: Regular rate and rhythm with normal S1/S2 without murmurs, rubs or gallops. Abdomen: Soft, mild diffuse tenderness, non-distended with normal bowel sounds. Musculoskeletal: No clubbing, cyanosis or edema bilaterally. Full range of motion without deformity. Skin: Skin color, texture, turgor normal.  No rashes or lesions. Neurologic:  Neurovascularly intact without any focal sensory/motor deficits.  Cranial nerves: II-XII intact, grossly non-focal.  Psychiatric: Alert and oriented, thought content appropriate, normal insight  Capillary Refill: Brisk, 3 seconds, normal   Peripheral Pulses: +2 palpable, equal bilaterally       Labs:   Recent Labs     11/14/22  1608 11/15/22  0426   WBC 10.5 15.2*   HGB 13.3 12.3   HCT 40.4 37.0    228     Recent Labs     11/14/22  1608 11/15/22  0426    141   K 3.8 4.5    107   CO2 24 26   BUN 12 13   CREATININE 0.7 0.6   CALCIUM 10.1 9.0   PHOS  --  3.3     Recent Labs     11/14/22  1608 11/15/22  0426   AST 29 22   ALT 11 12   BILITOT 0.3 0.4   ALKPHOS 63 56     Recent Labs     11/15/22  0426   INR 1.04     No results for input(s): Margarettelong Mcguire in the last 72 hours. Urinalysis:    No results found for: Jose Mathias, BACTERIA, RBCUA, BLOODU, SPECGRAV, [place] [address]    Radiology:  XR ABDOMEN FOR NG/OG/NE TUBE PLACEMENT   Final Result   Nasogastric tube projects in normal position. CT ABDOMEN PELVIS W IV CONTRAST Additional Contrast? None   Final Result   1. Loops of mildly dilated fluid-filled small bowel in the pelvis which   appear to represent ileum. No clear transition point is seen. Air is still   seen distally in the colon and the proximal duodenal and jejunal loops do not   appear dilated. This likely represents a partial small bowel obstruction. 2.  Colonic diverticulosis without evidence of diverticulitis. XR CHEST PORTABLE   Final Result   No acute cardiopulmonary disease         FL SMALL BOWEL FOLLOW THROUGH ONLY    (Results Pending)       Assessment/Plan:    Active Hospital Problems    Diagnosis     SBO (small bowel obstruction) ([facility] [address] [K56.609]      Priority: Medium     Partial small bowel obstruction: CT abdomen noted. General surgery consulted. NG tube placed. N.p.o.,. Change IV fluids to D5 with half-normal saline and KCl. Lactic acid elevation: Minimal.  Normalized with IV fluids. Leukocytosis: Possibly reactive. Continue to monitor.        DVT Prophylaxis: Lovenox  Diet: Diet NPO  Code Status: Full Code  PT/OT Eval Status: Order    Dispo -inpatient 1 to 2 adrian Nair MD

## 2022-11-16 ENCOUNTER — APPOINTMENT (OUTPATIENT)
Dept: GENERAL RADIOLOGY | Age: 82
DRG: 329 | End: 2022-11-16
Payer: COMMERCIAL

## 2022-11-16 LAB
ANION GAP SERPL CALCULATED.3IONS-SCNC: 10 MMOL/L (ref 3–16)
BASOPHILS ABSOLUTE: 0 K/UL (ref 0–0.2)
BASOPHILS RELATIVE PERCENT: 0.2 %
BUN BLDV-MCNC: 15 MG/DL (ref 7–20)
CALCIUM SERPL-MCNC: 8.7 MG/DL (ref 8.3–10.6)
CHLORIDE BLD-SCNC: 107 MMOL/L (ref 99–110)
CO2: 23 MMOL/L (ref 21–32)
CREAT SERPL-MCNC: <0.5 MG/DL (ref 0.6–1.2)
EOSINOPHILS ABSOLUTE: 0 K/UL (ref 0–0.6)
EOSINOPHILS RELATIVE PERCENT: 0 %
GFR SERPL CREATININE-BSD FRML MDRD: >60 ML/MIN/{1.73_M2}
GLUCOSE BLD-MCNC: 145 MG/DL (ref 70–99)
HCT VFR BLD CALC: 34.7 % (ref 36–48)
HEMOGLOBIN: 11.7 G/DL (ref 12–16)
LACTIC ACID: 1.5 MMOL/L (ref 0.4–2)
LYMPHOCYTES ABSOLUTE: 0.9 K/UL (ref 1–5.1)
LYMPHOCYTES RELATIVE PERCENT: 4.9 %
MAGNESIUM: 2 MG/DL (ref 1.8–2.4)
MCH RBC QN AUTO: 31.5 PG (ref 26–34)
MCHC RBC AUTO-ENTMCNC: 33.7 G/DL (ref 31–36)
MCV RBC AUTO: 93.5 FL (ref 80–100)
MONOCYTES ABSOLUTE: 0.7 K/UL (ref 0–1.3)
MONOCYTES RELATIVE PERCENT: 3.7 %
NEUTROPHILS ABSOLUTE: 16.3 K/UL (ref 1.7–7.7)
NEUTROPHILS RELATIVE PERCENT: 91.2 %
PDW BLD-RTO: 13.1 % (ref 12.4–15.4)
PHOSPHORUS: 1.7 MG/DL (ref 2.5–4.9)
PLATELET # BLD: 223 K/UL (ref 135–450)
PMV BLD AUTO: 7.7 FL (ref 5–10.5)
POTASSIUM SERPL-SCNC: 4.1 MMOL/L (ref 3.5–5.1)
RBC # BLD: 3.71 M/UL (ref 4–5.2)
SODIUM BLD-SCNC: 140 MMOL/L (ref 136–145)
WBC # BLD: 17.9 K/UL (ref 4–11)

## 2022-11-16 PROCEDURE — 85025 COMPLETE CBC W/AUTO DIFF WBC: CPT

## 2022-11-16 PROCEDURE — 74250 X-RAY XM SM INT 1CNTRST STD: CPT

## 2022-11-16 PROCEDURE — 80048 BASIC METABOLIC PNL TOTAL CA: CPT

## 2022-11-16 PROCEDURE — 74018 RADEX ABDOMEN 1 VIEW: CPT

## 2022-11-16 PROCEDURE — 2500000003 HC RX 250 WO HCPCS: Performed by: INTERNAL MEDICINE

## 2022-11-16 PROCEDURE — 94760 N-INVAS EAR/PLS OXIMETRY 1: CPT

## 2022-11-16 PROCEDURE — 99232 SBSQ HOSP IP/OBS MODERATE 35: CPT | Performed by: SURGERY

## 2022-11-16 PROCEDURE — 6360000004 HC RX CONTRAST MEDICATION: Performed by: SURGERY

## 2022-11-16 PROCEDURE — 2580000003 HC RX 258: Performed by: NURSE PRACTITIONER

## 2022-11-16 PROCEDURE — 2580000003 HC RX 258: Performed by: PHYSICIAN ASSISTANT

## 2022-11-16 PROCEDURE — 1200000000 HC SEMI PRIVATE

## 2022-11-16 PROCEDURE — 36415 COLL VENOUS BLD VENIPUNCTURE: CPT

## 2022-11-16 PROCEDURE — 0DH67UZ INSERTION OF FEEDING DEVICE INTO STOMACH, VIA NATURAL OR ARTIFICIAL OPENING: ICD-10-PCS | Performed by: RADIOLOGY

## 2022-11-16 PROCEDURE — 83735 ASSAY OF MAGNESIUM: CPT

## 2022-11-16 PROCEDURE — 2500000003 HC RX 250 WO HCPCS: Performed by: NURSE PRACTITIONER

## 2022-11-16 PROCEDURE — 6360000002 HC RX W HCPCS: Performed by: PHYSICIAN ASSISTANT

## 2022-11-16 PROCEDURE — 83605 ASSAY OF LACTIC ACID: CPT

## 2022-11-16 PROCEDURE — 84100 ASSAY OF PHOSPHORUS: CPT

## 2022-11-16 RX ORDER — DIAZEPAM 5 MG/ML
2.5 INJECTION, SOLUTION INTRAMUSCULAR; INTRAVENOUS EVERY 4 HOURS PRN
Status: DISCONTINUED | OUTPATIENT
Start: 2022-11-16 | End: 2022-11-28 | Stop reason: HOSPADM

## 2022-11-16 RX ADMIN — MORPHINE SULFATE 2 MG: 2 INJECTION, SOLUTION INTRAMUSCULAR; INTRAVENOUS at 21:34

## 2022-11-16 RX ADMIN — Medication 10 ML: at 08:18

## 2022-11-16 RX ADMIN — MORPHINE SULFATE 4 MG: 4 INJECTION, SOLUTION INTRAMUSCULAR; INTRAVENOUS at 18:07

## 2022-11-16 RX ADMIN — POTASSIUM PHOSPHATE, MONOBASIC AND POTASSIUM PHOSPHATE, DIBASIC 20 MMOL: 224; 236 INJECTION, SOLUTION, CONCENTRATE INTRAVENOUS at 14:26

## 2022-11-16 RX ADMIN — ENOXAPARIN SODIUM 40 MG: 100 INJECTION SUBCUTANEOUS at 08:18

## 2022-11-16 RX ADMIN — DIAZEPAM 2.5 MG: 5 INJECTION, SOLUTION INTRAMUSCULAR; INTRAVENOUS at 06:56

## 2022-11-16 RX ADMIN — POTASSIUM CHLORIDE, DEXTROSE MONOHYDRATE AND SODIUM CHLORIDE: 150; 5; 450 INJECTION, SOLUTION INTRAVENOUS at 21:34

## 2022-11-16 RX ADMIN — POTASSIUM CHLORIDE, DEXTROSE MONOHYDRATE AND SODIUM CHLORIDE: 150; 5; 450 INJECTION, SOLUTION INTRAVENOUS at 05:02

## 2022-11-16 RX ADMIN — DIAZEPAM 2.5 MG: 5 INJECTION, SOLUTION INTRAMUSCULAR; INTRAVENOUS at 02:40

## 2022-11-16 RX ADMIN — IOHEXOL 200 ML: 350 INJECTION, SOLUTION INTRAVENOUS at 17:00

## 2022-11-16 ASSESSMENT — PAIN DESCRIPTION - LOCATION: LOCATION: ABDOMEN

## 2022-11-16 ASSESSMENT — PAIN SCALES - GENERAL: PAINLEVEL_OUTOF10: 10

## 2022-11-16 ASSESSMENT — PAIN DESCRIPTION - ORIENTATION: ORIENTATION: LEFT

## 2022-11-16 NOTE — PLAN OF CARE
Problem: ABCDS Injury Assessment  Goal: Absence of physical injury  Outcome: Progressing     Problem: Pain  Goal: Verbalizes/displays adequate comfort level or baseline comfort level  Outcome: Progressing     Problem: Skin/Tissue Integrity  Goal: Absence of new skin breakdown  Description: 1. Monitor for areas of redness and/or skin breakdown  2. Assess vascular access sites hourly  3. Every 4-6 hours minimum:  Change oxygen saturation probe site  4. Every 4-6 hours:  If on nasal continuous positive airway pressure, respiratory therapy assess nares and determine need for appliance change or resting period.   Outcome: Progressing     Problem: Safety - Adult  Goal: Free from fall injury  Outcome: Progressing

## 2022-11-16 NOTE — PROGRESS NOTES
Assessment completed, see doc flowsheets. Pt is A&O X1. Lung sounds are clear. VSS. Tele on. Medication given per [facility]. Patient has no needs at this time. Call light within in reach, will continue to monitor.

## 2022-11-16 NOTE — PROGRESS NOTES
NG tube advanced by radiologist under xray. Unable to see any measurement markings. NG clamped per instructions from radiology as small bowel flow study incomplete.

## 2022-11-16 NOTE — PROGRESS NOTES
Assessment completed. VSS. NG tube at 60cm. Pt trying to pull on tube, needs persistent reminders not to pull on tube. Lung sounds clear bilaterally.  at bedside with living will. Living will copied and placed in chart. Call light within reach. Fall precautions in place.

## 2022-11-16 NOTE — PROGRESS NOTES
Unsuccessful NG tube placing in L nostril, pt has scant nose bleed when NG tube is out. NG tube placed in R nostril now, bridle placed. Pt tolerated well.

## 2022-11-16 NOTE — PROGRESS NOTES
Update given to daughter, Miguelvaldez. Direct room phone number given. All questions answered at this time.

## 2022-11-16 NOTE — PROGRESS NOTES
4 Eyes Skin Assessment     NAME:  Kiara Velasquez  YOB: 1940  MEDICAL RECORD NUMBER:  [id number]    The patient is being assess for  Transfer to New Unit    I agree that 2 RN's have performed a thorough Head to Toe Skin Assessment on the patient. ALL assessment sites listed below have been assessed. Areas assessed by both nurses:    Head, Face, Ears, Shoulders, Back, Chest, Arms, Elbows, Hands, Sacrum. Buttock, Coccyx, Ischium, and Legs. Feet and Heels        Does the Patient have a Wound?  No noted wound(s)       Lyle Prevention initiated:  Yes   Wound Care Orders initiated:  No    Pressure Injury (Stage 3,4, Unstageable, DTI, NWPT, and Complex wounds) if present place referral/consult order under [de-identified] No    New and Established Ostomies if present place consult order under : No      Nurse 1 eSignature: Electronically signed by Juaquin Chiang RN on 11/15/22 at 7:41 PM EST    **SHARE this note so that the co-signing nurse is able to place an eSignature**    Nurse 2 eSignature: Electronically signed by Esha Dasilva RN on 11/16/22 at 1:38 AM EST

## 2022-11-16 NOTE — PROGRESS NOTES
Pt less confused this evening than beginning of shift. Able to tell RN that she was in [place] but could not recognize that she was in the hospital. Able to tell RN that it was Nov 2022. C/o severe pain in left side of abd, holding on to left side. PRN morphine given. Has had some N/V. Did have a scant amount of emesis. Discussed with radiology that her small bowel follow through is not complete and that pt needs to remained clamped. Call light within reach. Fall precautions in place.

## 2022-11-16 NOTE — PROGRESS NOTES
Hospitalist Progress Note      PCP: Aaron Lima MD    Date of Admission: 11/14/2022    Chief Complaint: Abd pain and nausea    Hospital Course: admitted with SBO    Subjective: NG capped. Had Small bowel follow though this morning. Weak over all.  at bed side. Pt wants to be full code. Medications:  Reviewed    Infusion Medications    sodium chloride      dextrose 5% and 0.45% NaCl with KCl 20 mEq 75 mL/hr at 11/16/22 0502     Scheduled Medications    potassium phosphate IVPB  20 mmol IntraVENous Once    sodium chloride flush  5-40 mL IntraVENous 2 times per day    enoxaparin  40 mg SubCUTAneous Daily     PRN Meds: diazePAM, sodium chloride flush, sodium chloride, ondansetron, acetaminophen **OR** acetaminophen, morphine **OR** morphine      Intake/Output Summary (Last 24 hours) at 11/16/2022 1510  Last data filed at 11/16/2022 1330  Gross per 24 hour   Intake 705.06 ml   Output 450 ml   Net 255.06 ml       Physical Exam Performed:    BP (!) 148/76   Pulse 86   Temp 97.4 °F (36.3 °C) (Oral)   Resp 18   Ht 5' 3\" (1.6 m)   Wt 115 lb (52.2 kg)   SpO2 98%   BMI 20.37 kg/m²     General appearance: No apparent distress, appears stated age and cooperative. NG in place  HEENT: Pupils equal, round, and reactive to light. Conjunctivae/corneas clear. Neck: Supple, with full range of motion. No jugular venous distention. Trachea midline. Respiratory:  Normal respiratory effort. Clear to auscultation, bilaterally without Rales/Wheezes/Rhonchi. Cardiovascular: Regular rate and rhythm with normal S1/S2 without murmurs, rubs or gallops. Abdomen: Soft, mild to moderate lower abd tenderness noted. non-distended with normal bowel sounds. Musculoskeletal: No clubbing, cyanosis or edema bilaterally. Full range of motion without deformity. Skin: Skin color, texture, turgor normal.  No rashes or lesions. Neurologic:  Neurovascularly intact without any focal sensory/motor deficits.  Cranial nerves: II-XII intact, grossly non-focal.  Psychiatric: Alert and oriented, thought content appropriate, normal insight  Capillary Refill: Brisk, 3 seconds, normal   Peripheral Pulses: +2 palpable, equal bilaterally       Labs:   Recent Labs     11/14/22  1608 11/15/22  0426 11/16/22  0538   WBC 10.5 15.2* 17.9*   HGB 13.3 12.3 11.7*   HCT 40.4 37.0 34.7*    228 223     Recent Labs     11/14/22  1608 11/15/22  0426 11/16/22  0538    141 140   K 3.8 4.5 4.1    107 107   CO2 24 26 23   BUN 12 13 15   CREATININE 0.7 0.6 <0.5*   CALCIUM 10.1 9.0 8.7   PHOS  --  3.3 1.7*     Recent Labs     11/14/22  1608 11/15/22  0426   AST 29 22   ALT 11 12   BILITOT 0.3 0.4   ALKPHOS 63 56     Recent Labs     11/15/22  0426   INR 1.04     No results for input(s): Juli Raya in the last 72 hours. Urinalysis:    No results found for: Alvena Cassis, BACTERIA, RBCUA, BLOODU, [address], [place] [address]    Radiology:  XR ABDOMEN FOR NG/OG/NE TUBE PLACEMENT   Final Result   Looped configuration of nasogastric tube, coiled at the stomach and   redirected to the midesophagus level. Withdrawal of 20 cm is suggested,   followed by readvancement. XR ABDOMEN FOR NG/OG/NE TUBE PLACEMENT   Final Result   Nasogastric tube projects in normal position. CT ABDOMEN PELVIS W IV CONTRAST Additional Contrast? None   Final Result   1. Loops of mildly dilated fluid-filled small bowel in the pelvis which   appear to represent ileum. No clear transition point is seen. Air is still   seen distally in the colon and the proximal duodenal and jejunal loops do not   appear dilated. This likely represents a partial small bowel obstruction. 2.  Colonic diverticulosis without evidence of diverticulitis.          XR CHEST PORTABLE   Final Result   No acute cardiopulmonary disease         FL SMALL BOWEL FOLLOW THROUGH ONLY    (Results Pending)       Assessment/Plan:    Active Hospital Problems    Diagnosis     SBO (small bowel obstruction) [facility]) [G87.468]      Priority: Medium     Partial small bowel obstruction: CT abdomen noted. General surgery consulted. NG tube placed. Over all improving. SBFT pending. Cont bowel rest and Ivf. Lactic acid elevation:   Normalized with IV fluids. Leukocytosis: trending up still. Lactate ok. Check UA. Await SBFT.        Low phos: replace    DVT Prophylaxis: Lovenox  Diet: Diet NPO  Code Status: Full Code  PT/OT Eval Status: Order    Dispo: inpt 2 days       Dre Beck MD

## 2022-11-16 NOTE — PROGRESS NOTES
ALT 11 12   BILITOT 0.3 0.4   ALKPHOS 63 56     Amylase: No results for input(s): AMYLASE in the last 72 hours. Lipase:   Recent Labs     11/14/22  1608   LIPASE 24.0     Mag:      Recent Labs     11/15/22  0426 11/16/22  0538   MG 2.00 2.00      Phos:     Recent Labs     11/15/22  0426 11/16/22  0538   PHOS 3.3 1.7*      Coags:   Recent Labs     11/15/22  0426   INR 1.04   APTT 26.9       Cultures:  Relevant cultures documented under results section     Pathology:   No relevant pathology     Imaging:  I have personally reviewed the following films:    CT ABDOMEN PELVIS W IV CONTRAST Additional Contrast? None    Result Date: 11/14/2022  EXAMINATION: CT OF THE ABDOMEN AND PELVIS WITH CONTRAST 11/14/2022 4:54 pm TECHNIQUE: CT of the abdomen and pelvis was performed with the administration of intravenous contrast. Multiplanar reformatted images are provided for review. Automated exposure control, iterative reconstruction, and/or weight based adjustment of the mA/kV was utilized to reduce the radiation dose to as low as reasonably achievable. COMPARISON: None. HISTORY: ORDERING SYSTEM PROVIDED HISTORY: Left sided abd pain x 5 hours. TECHNOLOGIST PROVIDED HISTORY: Reason for exam:->Left sided abd pain x 5 hours. Additional Contrast?->None Decision Support Exception - unselect if not a suspected or confirmed emergency medical condition->Emergency Medical Condition (MA) Reason for Exam: Left sided abd pain x 5 hours FINDINGS: Lower Chest: Bibasilar atelectasis. No pleural effusion or consolidation. Organs: The liver is normal.  The gallbladder is normal.  The spleen and pancreas are normal.  Adrenal glands are normal.  Simple cyst within the right kidney. No follow-up is recommended. No nephrolithiasis or hydronephrosis. GI/Bowel: The distal esophagus and stomach are normal.  There are multiple loops of mildly dilated, fluid-filled small bowel in the pelvis which appear to represent ileum.   The loops measure up to 3 cm in diameter. No definite transition point is seen. There are scattered colonic diverticula without evidence of diverticulitis. No colonic wall thickening. No evidence of appendicitis. Pelvis: The urinary bladder is normal.  Status post hysterectomy. Peritoneum/Retroperitoneum: The aorta is normal caliber. No lymphadenopathy. Bones/Soft Tissues: No suspicious osseous lesions     1. Loops of mildly dilated fluid-filled small bowel in the pelvis which appear to represent ileum. No clear transition point is seen. Air is still seen distally in the colon and the proximal duodenal and jejunal loops do not appear dilated. This likely represents a partial small bowel obstruction. 2.  Colonic diverticulosis without evidence of diverticulitis. XR CHEST PORTABLE    Result Date: 11/14/2022  EXAMINATION: ONE XRAY VIEW OF THE CHEST 11/14/2022 4:27 pm COMPARISON: None. HISTORY: ORDERING SYSTEM PROVIDED HISTORY: L abd pain radiating to chest TECHNOLOGIST PROVIDED HISTORY: Reason for exam:->L abd pain radiating to chest Reason for Exam: L abd pain radiating to chest FINDINGS: No acute airspace infiltrate. No pneumothorax or pleural effusion. Normal cardiomediastinal silhouette     No acute cardiopulmonary disease     XR ABDOMEN FOR NG/OG/NE TUBE PLACEMENT    Result Date: 11/16/2022  EXAMINATION: ONE SUPINE XRAY VIEW(S) OF THE ABDOMEN 11/16/2022 3:52 am COMPARISON: 11/15/2022 HISTORY: ORDERING SYSTEM PROVIDED HISTORY: Confirmation of course of NG/OG/NE tube and location of tip of tube TECHNOLOGIST PROVIDED HISTORY: Reason for exam:->Confirmation of course of NG/OG/NE tube and location of tip of tube Portable? ->Yes Reason for Exam: Confirmation of course of NG/OG/NE tube and location of tip of tube FINDINGS: Nasogastric tube is coiled at the level of the proximal stomach and redirected cephalad into the esophagus, tip at the level of the carl.  Cardiac silhouette is normal.  Right hemidiaphragm is elevated associated with atelectasis. Nonspecific bowel gas pattern is noted in the upper abdomen. No free air is identified. Looped configuration of nasogastric tube, coiled at the stomach and redirected to the midesophagus level. Withdrawal of 20 cm is suggested, followed by readvancement. XR ABDOMEN FOR NG/OG/NE TUBE PLACEMENT    Result Date: 11/15/2022  EXAMINATION: ONE SUPINE XRAY VIEW(S) OF THE ABDOMEN 11/15/2022 8:52 am COMPARISON: 11/14/2022 HISTORY: ORDERING SYSTEM PROVIDED HISTORY: NG placement TECHNOLOGIST PROVIDED HISTORY: Reason for exam:->NG placement Portable? ->Yes Reason for Exam: NG placement FINDINGS: Nasogastric tube is coiled in the gastric fundus. Bowel gas pattern in the upper abdomen is unremarkable. Right hemidiaphragm is elevated. Mild left basilar atelectasis. Nasogastric tube projects in normal position. Scheduled Meds:   potassium phosphate IVPB  20 mmol IntraVENous Once    sodium chloride flush  5-40 mL IntraVENous 2 times per day    enoxaparin  40 mg SubCUTAneous Daily     Continuous Infusions:   sodium chloride      dextrose 5% and 0.45% NaCl with KCl 20 mEq 75 mL/hr at 11/16/22 0502     PRN Meds:.diazePAM, sodium chloride flush, sodium chloride, ondansetron, acetaminophen **OR** acetaminophen, morphine **OR** morphine      Assessment:  Patient Active Problem List   Diagnosis    Age-related osteoporosis without current pathological fracture    White coat syndrome without hypertension    SBO (small bowel obstruction) (HCC)     Small bowel obstruction     Plan:  1. The patient has a small bowel obstruction that is likely from adhesive disease. Most will respond to maximal conservative measures. If she does not respond to conservative management or clinically deteriorates, may need surgical exploration  2. Small bowel follow through underway  3. NG decompression, bowel rest  4. Monitor bowel function, no flatus or stool since admission  5.  IVF, monitor and replace electrolytes as needed  6. Monitor leukocytosis, rise likely from gastric distension as NG was not correctly placed, will trend  7. Pain medication and antiemetics as needed with caution as they may mask a worsening exam  8. Defer management of remainder of medical comorbidities to primary and consulting teams    Ki Pastrana MD, FACS  11/16/2022  11:28 AM

## 2022-11-17 ENCOUNTER — APPOINTMENT (OUTPATIENT)
Dept: GENERAL RADIOLOGY | Age: 82
DRG: 329 | End: 2022-11-17
Payer: COMMERCIAL

## 2022-11-17 ENCOUNTER — ANESTHESIA EVENT (OUTPATIENT)
Dept: OPERATING ROOM | Age: 82
DRG: 329 | End: 2022-11-17
Payer: COMMERCIAL

## 2022-11-17 LAB
ANION GAP SERPL CALCULATED.3IONS-SCNC: 11 MMOL/L (ref 3–16)
BACTERIA: ABNORMAL /HPF
BASOPHILS ABSOLUTE: 0 K/UL (ref 0–0.2)
BASOPHILS RELATIVE PERCENT: 0 %
BILIRUBIN URINE: ABNORMAL
BLOOD, URINE: ABNORMAL
BUN BLDV-MCNC: 21 MG/DL (ref 7–20)
CALCIUM SERPL-MCNC: 9.1 MG/DL (ref 8.3–10.6)
CHLORIDE BLD-SCNC: 104 MMOL/L (ref 99–110)
CLARITY: ABNORMAL
CO2: 23 MMOL/L (ref 21–32)
COLOR: ABNORMAL
CREAT SERPL-MCNC: 0.5 MG/DL (ref 0.6–1.2)
EOSINOPHILS ABSOLUTE: 0.2 K/UL (ref 0–0.6)
EOSINOPHILS RELATIVE PERCENT: 1 %
EPITHELIAL CELLS, UA: 21 /HPF (ref 0–5)
GFR SERPL CREATININE-BSD FRML MDRD: >60 ML/MIN/{1.73_M2}
GLUCOSE BLD-MCNC: 151 MG/DL (ref 70–99)
GLUCOSE URINE: NEGATIVE MG/DL
GRANULAR CASTS: PRESENT
HCT VFR BLD CALC: 39.7 % (ref 36–48)
HEMOGLOBIN: 13.1 G/DL (ref 12–16)
HYALINE CASTS: 40 /LPF (ref 0–8)
HYALINE CASTS: PRESENT
KETONES, URINE: ABNORMAL MG/DL
LEUKOCYTE ESTERASE, URINE: ABNORMAL
LYMPHOCYTES ABSOLUTE: 1.3 K/UL (ref 1–5.1)
LYMPHOCYTES RELATIVE PERCENT: 7 %
MAGNESIUM: 2.1 MG/DL (ref 1.8–2.4)
MCH RBC QN AUTO: 31 PG (ref 26–34)
MCHC RBC AUTO-ENTMCNC: 33 G/DL (ref 31–36)
MCV RBC AUTO: 94.1 FL (ref 80–100)
MICROSCOPIC EXAMINATION: YES
MONOCYTES ABSOLUTE: 0.4 K/UL (ref 0–1.3)
MONOCYTES RELATIVE PERCENT: 2 %
NEUTROPHILS ABSOLUTE: 17.1 K/UL (ref 1.7–7.7)
NEUTROPHILS RELATIVE PERCENT: 90 %
NITRITE, URINE: NEGATIVE
PDW BLD-RTO: 13.3 % (ref 12.4–15.4)
PH UA: 5.5 (ref 5–8)
PHOSPHORUS: 1.8 MG/DL (ref 2.5–4.9)
PLATELET # BLD: 229 K/UL (ref 135–450)
PLATELET SLIDE REVIEW: ADEQUATE
PMV BLD AUTO: 8.3 FL (ref 5–10.5)
POTASSIUM SERPL-SCNC: 4.2 MMOL/L (ref 3.5–5.1)
PROTEIN UA: 100 MG/DL
RBC # BLD: 4.22 M/UL (ref 4–5.2)
RBC # BLD: NORMAL 10*6/UL
RBC UA: 8 /HPF (ref 0–4)
SLIDE REVIEW: ABNORMAL
SODIUM BLD-SCNC: 138 MMOL/L (ref 136–145)
SPECIFIC GRAVITY UA: >=1.03 (ref 1–1.03)
URINE REFLEX TO CULTURE: YES
URINE TYPE: ABNORMAL
UROBILINOGEN, URINE: 1 E.U./DL
WBC # BLD: 19 K/UL (ref 4–11)
WBC UA: 14 /HPF (ref 0–5)

## 2022-11-17 PROCEDURE — 2500000003 HC RX 250 WO HCPCS: Performed by: INTERNAL MEDICINE

## 2022-11-17 PROCEDURE — 83735 ASSAY OF MAGNESIUM: CPT

## 2022-11-17 PROCEDURE — 84100 ASSAY OF PHOSPHORUS: CPT

## 2022-11-17 PROCEDURE — 2580000003 HC RX 258: Performed by: PHYSICIAN ASSISTANT

## 2022-11-17 PROCEDURE — 2580000003 HC RX 258: Performed by: SURGERY

## 2022-11-17 PROCEDURE — 80048 BASIC METABOLIC PNL TOTAL CA: CPT

## 2022-11-17 PROCEDURE — 2580000003 HC RX 258: Performed by: INTERNAL MEDICINE

## 2022-11-17 PROCEDURE — 6360000002 HC RX W HCPCS: Performed by: SURGERY

## 2022-11-17 PROCEDURE — 81001 URINALYSIS AUTO W/SCOPE: CPT

## 2022-11-17 PROCEDURE — 85025 COMPLETE CBC W/AUTO DIFF WBC: CPT

## 2022-11-17 PROCEDURE — 74018 RADEX ABDOMEN 1 VIEW: CPT

## 2022-11-17 PROCEDURE — 6360000002 HC RX W HCPCS: Performed by: PHYSICIAN ASSISTANT

## 2022-11-17 PROCEDURE — 36415 COLL VENOUS BLD VENIPUNCTURE: CPT

## 2022-11-17 PROCEDURE — 1200000000 HC SEMI PRIVATE

## 2022-11-17 PROCEDURE — 87086 URINE CULTURE/COLONY COUNT: CPT

## 2022-11-17 PROCEDURE — 99232 SBSQ HOSP IP/OBS MODERATE 35: CPT | Performed by: SURGERY

## 2022-11-17 RX ORDER — METOCLOPRAMIDE HYDROCHLORIDE 5 MG/ML
10 INJECTION INTRAMUSCULAR; INTRAVENOUS EVERY 6 HOURS
Status: DISCONTINUED | OUTPATIENT
Start: 2022-11-17 | End: 2022-11-18

## 2022-11-17 RX ADMIN — SODIUM PHOSPHATE, MONOBASIC, MONOHYDRATE 20 MMOL: 276; 142 INJECTION, SOLUTION INTRAVENOUS at 13:34

## 2022-11-17 RX ADMIN — METOCLOPRAMIDE 10 MG: 5 INJECTION, SOLUTION INTRAMUSCULAR; INTRAVENOUS at 20:39

## 2022-11-17 RX ADMIN — METOCLOPRAMIDE 10 MG: 5 INJECTION, SOLUTION INTRAMUSCULAR; INTRAVENOUS at 15:21

## 2022-11-17 RX ADMIN — Medication 10 ML: at 09:20

## 2022-11-17 RX ADMIN — PIPERACILLIN AND TAZOBACTAM 3375 MG: 3; .375 INJECTION, POWDER, FOR SOLUTION INTRAVENOUS at 23:38

## 2022-11-17 RX ADMIN — Medication 10 ML: at 20:38

## 2022-11-17 RX ADMIN — MORPHINE SULFATE 4 MG: 4 INJECTION, SOLUTION INTRAMUSCULAR; INTRAVENOUS at 13:24

## 2022-11-17 RX ADMIN — POTASSIUM CHLORIDE, DEXTROSE MONOHYDRATE AND SODIUM CHLORIDE: 150; 5; 450 INJECTION, SOLUTION INTRAVENOUS at 20:48

## 2022-11-17 RX ADMIN — ENOXAPARIN SODIUM 40 MG: 100 INJECTION SUBCUTANEOUS at 09:07

## 2022-11-17 RX ADMIN — PIPERACILLIN AND TAZOBACTAM 3375 MG: 3; .375 INJECTION, POWDER, FOR SOLUTION INTRAVENOUS at 15:21

## 2022-11-17 RX ADMIN — METOCLOPRAMIDE 10 MG: 5 INJECTION, SOLUTION INTRAMUSCULAR; INTRAVENOUS at 09:07

## 2022-11-17 ASSESSMENT — PAIN SCALES - GENERAL
PAINLEVEL_OUTOF10: 10
PAINLEVEL_OUTOF10: 10
PAINLEVEL_OUTOF10: 7

## 2022-11-17 NOTE — PROGRESS NOTES
[facility]  Progress Note    Pt Name: Tasha Jacques  MRN: [id number]  YOB: 1940  Date of evaluation: 2022    Chief Complaint: abdominal pain      Subjective:    No acute events overnight  Minimally verbal, complains of some abdominal pain, NG clamped as just returned to room after radiology  No history of nausea with NG  Unclear last flatus/stool    Vital Signs:  Patient Vitals for the past 24 hrs:   BP Temp Temp src Pulse Resp SpO2   22 0905 (!) 150/85 97.7 °F (36.5 °C) Oral 80 16 95 %   22 0412 (!) 152/83 97.4 °F (36.3 °C) Oral 76 18 93 %   22 2354 (!) 149/77 98.1 °F (36.7 °C) Oral 82 18 92 %   22 2115 (!) 155/86 97.3 °F (36.3 °C) Oral 89 18 92 %   22 1801 (!) 157/91 97.7 °F (36.5 °C) Oral 80 18 93 %   22 1453 (!) 148/76 97.4 °F (36.3 °C) Oral 86 18 98 %   22 1229 -- -- -- 85 18 96 %      TEMPERATURE HISTORY 24H: Temp (24hrs), Av.6 °F (36.4 °C), Min:97.3 °F (36.3 °C), Max:98.1 °F (36.7 °C)    BLOOD PRESSURE HISTORY: Systolic (95ALR), KEZ:773 , Min:126 , HOJ:133    Diastolic (65FZZ), EAO:06, Min:72, Max:91      Intake/Output:    I/O last 3 completed shifts: In: 705.1 [I.V.:705.1]  Out: 950 [Urine:450; Emesis/NG output:500]  No intake/output data recorded.   Drain/tube Output:           Physical Exam:  General: fatigued, somnolent, no acute distress, confused at baseline and minimally conversant  Cardiac: regular rate and rhythm   Pulmonary: clear to auscultation bilaterally   Abdomen: soft, non-distended, non-tender     Labs:  CBC:    Recent Labs     11/14/22  1608 11/15/22  0426 22  0538   WBC 10.5 15.2* 17.9*   HGB 13.3 12.3 11.7*   HCT 40.4 37.0 34.7*    228 223     BMP:    Recent Labs     22  1608 11/15/22  0426 22  0538    141 140   K 3.8 4.5 4.1    107 107   CO2 24 26 23   BUN 12 13 15   CREATININE 0.7 0.6 <0.5*   GLUCOSE 154* 135* 145*     Hepatic:   Recent Labs 11/14/22  1608 11/15/22  0426   AST 29 22   ALT 11 12   BILITOT 0.3 0.4   ALKPHOS 63 56     Amylase: No results for input(s): AMYLASE in the last 72 hours. Lipase:   Recent Labs     11/14/22 1608   LIPASE 24.0     Mag:      Recent Labs     11/15/22  0426 11/16/22  0538   MG 2.00 2.00      Phos:     Recent Labs     11/15/22  0426 11/16/22  0538   PHOS 3.3 1.7*      Coags:   Recent Labs     11/15/22  0426   INR 1.04   APTT 26.9       Cultures:  Relevant cultures documented under results section     Pathology:   No relevant pathology     Imaging:  I have personally reviewed the following films:  XR ABDOMEN (KUB) (SINGLE AP VIEW) [[id number]] Collected: 11/17/22 0953 Updated: 11/17/22 0959 Narrative:  EXAMINATION:   ONE SUPINE XRAY VIEW(S) OF THE ABDOMEN     11/17/2022 9:26 am     COMPARISON:   11/16/2022 small-bowel follow-through     11/14/2022 CT abdomen and pelvis     HISTORY:   ORDERING SYSTEM PROVIDED HISTORY: SBO/ileus   TECHNOLOGIST PROVIDED HISTORY:   Reason for exam:->SBO/ileus   Reason for Exam: SBO/ileus     FINDINGS:   Nasogastric tube remains intragastric, with portion coiled in the fundus and   distal tip at the level of the gastric body. Contrast remains pooled in the gastric fundus. There is dilute contrast in   multiple loops of small bowel which remain dilated. No passage into the   colon is identified. Impression:  Distal small-bowel obstruction pattern. It would be helpful to position the patient right lateral decubitus, in order   to encourage gastric emptying. [facility] SMALL BOWEL FOLLOW THROUGH ONLY [[id number]] Collected: 11/17/22 0704 Updated: 11/17/22 0711 Narrative:  EXAMINATION:   SMALL BOWEL FOLLOW THROUGH SERIES     11/16/2022     TECHNIQUE:   Small bowel follow through series was performed with overhead images and spot   images. FLUOROSCOPY DOSE AND TYPE OR TIME AND EXPOSURES:   No fluoroscopy utilized.      COMPARISON:   None     HISTORY:   ORDERING SYSTEM PROVIDED HISTORY: SBO followup, water soluble contrast to be   given through NG   TECHNOLOGIST PROVIDED HISTORY:   Reason for exam:->SBO followup, water soluble contrast to be given through NG   Reason for Exam: SBO followup, water soluble contrast to be given through NG     FINDINGS:    imaging of the abdomen shows multiple dilated loops of air-filled small   bowel diffusely. Water-soluble contrast was administered through an enteric tube in the lumen   of the stomach. Of note, the tube is coiled and side holes and tip extend   back superiorly into the distal esophagus. As result, there is significant   contrast within the esophagus on initial image. Subsequent images show   concentration of contrast within the stomach. At 90 minutes, very minimal   contrast has passed into the jejunum. At 2 hours 30 minutes, there is   significant concentration of contrast within the stomach with slightly more   contrast in the jejunum. Imaging was performed through 9 hours, at which   time there is still significant concentration in the stomach with a dilute   amount of contrast in the jejunum. Impression:  Small-bowel follow-through study showing significant retention of contrast in   the lumen of the stomach and only a small amount of transit into the small   bowel that is very dilute. Pattern may represent a partial small bowel   obstruction. Gastroparesis or ileus may also be considered. CT ABDOMEN PELVIS W IV CONTRAST Additional Contrast? None    Result Date: 11/14/2022  EXAMINATION: CT OF THE ABDOMEN AND PELVIS WITH CONTRAST 11/14/2022 4:54 pm TECHNIQUE: CT of the abdomen and pelvis was performed with the administration of intravenous contrast. Multiplanar reformatted images are provided for review. Automated exposure control, iterative reconstruction, and/or weight based adjustment of the mA/kV was utilized to reduce the radiation dose to as low as reasonably achievable. COMPARISON: None.  HISTORY: ORDERING SYSTEM PROVIDED HISTORY: Left sided abd pain x 5 hours. TECHNOLOGIST PROVIDED HISTORY: Reason for exam:->Left sided abd pain x 5 hours. Additional Contrast?->None Decision Support Exception - unselect if not a suspected or confirmed emergency medical condition->Emergency Medical Condition (MA) Reason for Exam: Left sided abd pain x 5 hours FINDINGS: Lower Chest: Bibasilar atelectasis. No pleural effusion or consolidation. Organs: The liver is normal.  The gallbladder is normal.  The spleen and pancreas are normal.  Adrenal glands are normal.  Simple cyst within the right kidney. No follow-up is recommended. No nephrolithiasis or hydronephrosis. GI/Bowel: The distal esophagus and stomach are normal.  There are multiple loops of mildly dilated, fluid-filled small bowel in the pelvis which appear to represent ileum. The loops measure up to 3 cm in diameter. No definite transition point is seen. There are scattered colonic diverticula without evidence of diverticulitis. No colonic wall thickening. No evidence of appendicitis. Pelvis: The urinary bladder is normal.  Status post hysterectomy. Peritoneum/Retroperitoneum: The aorta is normal caliber. No lymphadenopathy. Bones/Soft Tissues: No suspicious osseous lesions     1. Loops of mildly dilated fluid-filled small bowel in the pelvis which appear to represent ileum. No clear transition point is seen. Air is still seen distally in the colon and the proximal duodenal and jejunal loops do not appear dilated. This likely represents a partial small bowel obstruction. 2.  Colonic diverticulosis without evidence of diverticulitis. XR CHEST PORTABLE    Result Date: 11/14/2022  EXAMINATION: ONE XRAY VIEW OF THE CHEST 11/14/2022 4:27 pm COMPARISON: None.  HISTORY: ORDERING SYSTEM PROVIDED HISTORY: L abd pain radiating to chest TECHNOLOGIST PROVIDED HISTORY: Reason for exam:->L abd pain radiating to chest Reason for Exam: L abd pain radiating to chest FINDINGS: No acute airspace infiltrate. No pneumothorax or pleural effusion. Normal cardiomediastinal silhouette     No acute cardiopulmonary disease     XR ABDOMEN FOR NG/OG/NE TUBE PLACEMENT    Result Date: 11/16/2022  EXAMINATION: ONE SUPINE XRAY VIEW(S) OF THE ABDOMEN 11/16/2022 3:52 am COMPARISON: 11/15/2022 HISTORY: ORDERING SYSTEM PROVIDED HISTORY: Confirmation of course of NG/OG/NE tube and location of tip of tube TECHNOLOGIST PROVIDED HISTORY: Reason for exam:->Confirmation of course of NG/OG/NE tube and location of tip of tube Portable? ->Yes Reason for Exam: Confirmation of course of NG/OG/NE tube and location of tip of tube FINDINGS: Nasogastric tube is coiled at the level of the proximal stomach and redirected cephalad into the esophagus, tip at the level of the carl. Cardiac silhouette is normal.  Right hemidiaphragm is elevated associated with atelectasis. Nonspecific bowel gas pattern is noted in the upper abdomen. No free air is identified. Looped configuration of nasogastric tube, coiled at the stomach and redirected to the midesophagus level. Withdrawal of 20 cm is suggested, followed by readvancement. XR ABDOMEN FOR NG/OG/NE TUBE PLACEMENT    Result Date: 11/15/2022  EXAMINATION: ONE SUPINE XRAY VIEW(S) OF THE ABDOMEN 11/15/2022 8:52 am COMPARISON: 11/14/2022 HISTORY: ORDERING SYSTEM PROVIDED HISTORY: NG placement TECHNOLOGIST PROVIDED HISTORY: Reason for exam:->NG placement Portable? ->Yes Reason for Exam: NG placement FINDINGS: Nasogastric tube is coiled in the gastric fundus. Bowel gas pattern in the upper abdomen is unremarkable. Right hemidiaphragm is elevated. Mild left basilar atelectasis. Nasogastric tube projects in normal position.        Scheduled Meds:   metoclopramide  10 mg IntraVENous Q6H    sodium chloride flush  5-40 mL IntraVENous 2 times per day    enoxaparin  40 mg SubCUTAneous Daily     Continuous Infusions:   sodium chloride      dextrose 5% and 0.45% NaCl with KCl 20 mEq 75 mL/hr at 11/16/22 7136     PRN Meds:.diazePAM, sodium chloride flush, sodium chloride, ondansetron, acetaminophen **OR** acetaminophen, morphine **OR** morphine      Assessment:  Patient Active Problem List   Diagnosis    Age-related osteoporosis without current pathological fracture    White coat syndrome without hypertension    SBO (small bowel obstruction) (HCC)     Small bowel obstruction     Plan:  1. The patient has a small bowel obstruction that is likely from adhesive disease. Most will respond to maximal conservative measures. If she does not respond to conservative management or clinically deteriorates, may need surgical exploration. If not significantly better tomorrow will likely need to plan exploration  2. Small bowel follow through concerning for obstruction, ileus or gastroparesis, will give reglan trial  3. NG decompression, bowel rest  4. Monitor bowel function, no flatus or stool since admission  5. IVF, monitor and replace electrolytes as needed  6. Monitor leukocytosis, will start antibiotics to prevent bacterial translocation in small bowel  7. Pain medication and antiemetics as needed with caution as they may mask a worsening exam  8. Defer management of remainder of medical comorbidities to primary and consulting teams    Ki Greenwood MD, FACS  11/17/2022  10:09 AM

## 2022-11-17 NOTE — PROGRESS NOTES
Shift assessment completed. Routine vitals taken. Scheduled medications given subcutaneous And through IV. Patient is awake. Respirations are easy and unlabored. Patient does not appear to be in distress. Resident transported  to radiology.

## 2022-11-17 NOTE — PROGRESS NOTES
Hospitalist Progress Note      PCP: Elliot Ni MD    Date of Admission: 11/14/2022    Chief Complaint: Abd pain and nausea    Hospital Course: admitted with partial SBO. General surgery consulted. Conservative management with NG tube chosen. Subjective: Patient continued to be very weak. Still has diffuse abdominal tenderness. NG suctioning ongoing.  at bedside. Medications:  Reviewed    Infusion Medications    sodium chloride      dextrose 5% and 0.45% NaCl with KCl 20 mEq 125 mL/hr at 11/17/22 1142     Scheduled Medications    metoclopramide  10 mg IntraVENous Q6H    sodium phosphate IVPB  20 mmol IntraVENous Once    sodium chloride flush  5-40 mL IntraVENous 2 times per day    enoxaparin  40 mg SubCUTAneous Daily     PRN Meds: diazePAM, sodium chloride flush, sodium chloride, ondansetron, acetaminophen **OR** acetaminophen, morphine **OR** morphine      Intake/Output Summary (Last 24 hours) at 11/17/2022 1348  Last data filed at 11/17/2022 1132  Gross per 24 hour   Intake 0 ml   Output 2000 ml   Net -2000 ml       Physical Exam Performed:    /76   Pulse 88   Temp 98.3 °F (36.8 °C) (Oral)   Resp 18   Ht 5' 3\" (1.6 m)   Wt 115 lb (52.2 kg)   SpO2 92%   BMI 20.37 kg/m²     General appearance: No apparent distress, appears stated age and cooperative. fatigued overall. NG in place. HEENT: Pupils equal, round, and reactive to light. Conjunctivae/corneas clear. Neck: Supple, with full range of motion. No jugular venous distention. Trachea midline. Respiratory:  Normal respiratory effort. Clear to auscultation, bilaterally without Rales/Wheezes/Rhonchi. Cardiovascular: Regular rate and rhythm with normal S1/S2 without murmurs, rubs or gallops. Abdomen: Soft,   moderate diffuse abd tenderness to palpation. Non-distended with normal bowel sounds. Musculoskeletal: No clubbing, cyanosis or edema bilaterally. Full range of motion without deformity.   Skin: Skin color, texture, turgor normal.  No rashes or lesions. Neurologic:  Neurovascularly intact without any focal sensory/motor deficits. Cranial nerves: II-XII intact, grossly non-focal.  Psychiatric: Alert and oriented, thought content appropriate, normal insight  Capillary Refill: Brisk, 3 seconds, normal   Peripheral Pulses: +2 palpable, equal bilaterally       Labs:   Recent Labs     11/15/22  0426 11/16/22  0538 11/17/22  0823   WBC 15.2* 17.9* 19.0*   HGB 12.3 11.7* 13.1   HCT 37.0 34.7* 39.7    223 229     Recent Labs     11/15/22  0426 11/16/22  0538 11/17/22  0823    140 138   K 4.5 4.1 4.2    107 104   CO2 26 23 23   BUN 13 15 21*   CREATININE 0.6 <0.5* 0.5*   CALCIUM 9.0 8.7 9.1   PHOS 3.3 1.7* 1.8*     Recent Labs     11/14/22  1608 11/15/22  0426   AST 29 22   ALT 11 12   BILITOT 0.3 0.4   ALKPHOS 63 56     Recent Labs     11/15/22  0426   INR 1.04     No results for input(s): Berta Salomon in the last 72 hours. Urinalysis:    No results found for: Helder Guardian, BACTERIA, RBCUA, BLOODU, SPECGRAV, [place] [address]    Radiology:  XR ABDOMEN (KUB) (SINGLE AP VIEW)   Final Result   Distal small-bowel obstruction pattern. It would be helpful to position the patient right lateral decubitus, in order   to encourage gastric emptying. [place] SMALL BOWEL FOLLOW THROUGH ONLY   Final Result   Small-bowel follow-through study showing significant retention of contrast in   the lumen of the stomach and only a small amount of transit into the small   bowel that is very dilute. Pattern may represent a partial small bowel   obstruction. Gastroparesis or ileus may also be considered. XR ABDOMEN FOR NG/OG/NE TUBE PLACEMENT   Final Result   Looped configuration of nasogastric tube, coiled at the stomach and   redirected to the midesophagus level. Withdrawal of 20 cm is suggested,   followed by readvancement.          XR ABDOMEN FOR NG/OG/NE TUBE PLACEMENT   Final Result   Nasogastric tube projects in normal position. CT ABDOMEN PELVIS W IV CONTRAST Additional Contrast? None   Final Result   1. Loops of mildly dilated fluid-filled small bowel in the pelvis which   appear to represent ileum. No clear transition point is seen. Air is still   seen distally in the colon and the proximal duodenal and jejunal loops do not   appear dilated. This likely represents a partial small bowel obstruction. 2.  Colonic diverticulosis without evidence of diverticulitis. XR CHEST PORTABLE   Final Result   No acute cardiopulmonary disease             Assessment/Plan:    Active Hospital Problems    Diagnosis     SBO (small bowel obstruction) ([facility] [address] [K56.609]      Priority: Medium     Partial small bowel obstruction: CT abdomen noted. General surgery consulted. NG tube placed. Small bowel follow-through yesterday and subsequent KUB today showed distal small bowel obstruction. Started on Reglan by neurosurgery. Continue n.p.o., IV fluids, NG suctioning. If not much improvement by tomorrow, patient may need surgical additional ice. Lactic acid elevation:   Normalized with IV fluids. Leukocytosis: trending up still. Lactate ok. Still awaiting urinalysis. Hypophosphatemia: Replacement per protocol.     DVT Prophylaxis: Lovenox  Diet: Diet NPO  Code Status: Full Code  PT/OT Eval Status:      Dispo: inpt 2 days at least      Tucker Ross MD

## 2022-11-18 ENCOUNTER — APPOINTMENT (OUTPATIENT)
Dept: GENERAL RADIOLOGY | Age: 82
DRG: 329 | End: 2022-11-18
Payer: COMMERCIAL

## 2022-11-18 LAB
ANION GAP SERPL CALCULATED.3IONS-SCNC: 8 MMOL/L (ref 3–16)
ATYPICAL LYMPHOCYTE RELATIVE PERCENT: 2 % (ref 0–6)
BANDED NEUTROPHILS RELATIVE PERCENT: 37 % (ref 0–7)
BASOPHILS ABSOLUTE: 0 K/UL (ref 0–0.2)
BASOPHILS RELATIVE PERCENT: 0 %
BUN BLDV-MCNC: 24 MG/DL (ref 7–20)
CALCIUM SERPL-MCNC: 8.6 MG/DL (ref 8.3–10.6)
CHLORIDE BLD-SCNC: 106 MMOL/L (ref 99–110)
CO2: 26 MMOL/L (ref 21–32)
CREAT SERPL-MCNC: 0.7 MG/DL (ref 0.6–1.2)
EOSINOPHILS ABSOLUTE: 0 K/UL (ref 0–0.6)
EOSINOPHILS RELATIVE PERCENT: 0 %
GFR SERPL CREATININE-BSD FRML MDRD: >60 ML/MIN/{1.73_M2}
GLUCOSE BLD-MCNC: 154 MG/DL (ref 70–99)
HCT VFR BLD CALC: 36.7 % (ref 36–48)
HEMOGLOBIN: 12.3 G/DL (ref 12–16)
LYMPHOCYTES ABSOLUTE: 0.5 K/UL (ref 1–5.1)
LYMPHOCYTES RELATIVE PERCENT: 3 %
MAGNESIUM: 2.3 MG/DL (ref 1.8–2.4)
MCH RBC QN AUTO: 31.4 PG (ref 26–34)
MCHC RBC AUTO-ENTMCNC: 33.6 G/DL (ref 31–36)
MCV RBC AUTO: 93.5 FL (ref 80–100)
METAMYELOCYTES RELATIVE PERCENT: 2 %
MONOCYTES ABSOLUTE: 0.4 K/UL (ref 0–1.3)
MONOCYTES RELATIVE PERCENT: 4 %
NEUTROPHILS ABSOLUTE: 8.6 K/UL (ref 1.7–7.7)
NEUTROPHILS RELATIVE PERCENT: 52 %
PDW BLD-RTO: 13.1 % (ref 12.4–15.4)
PHOSPHORUS: 1.9 MG/DL (ref 2.5–4.9)
PLATELET # BLD: 244 K/UL (ref 135–450)
PLATELET SLIDE REVIEW: ADEQUATE
PMV BLD AUTO: 7.9 FL (ref 5–10.5)
POTASSIUM SERPL-SCNC: 4 MMOL/L (ref 3.5–5.1)
RBC # BLD: 3.92 M/UL (ref 4–5.2)
RBC # BLD: NORMAL 10*6/UL
SLIDE REVIEW: ABNORMAL
SODIUM BLD-SCNC: 140 MMOL/L (ref 136–145)
URINE CULTURE, ROUTINE: NORMAL
WBC # BLD: 9.4 K/UL (ref 4–11)

## 2022-11-18 PROCEDURE — APPSS15 APP SPLIT SHARED TIME 0-15 MINUTES: Performed by: NURSE PRACTITIONER

## 2022-11-18 PROCEDURE — 2580000003 HC RX 258: Performed by: PHYSICIAN ASSISTANT

## 2022-11-18 PROCEDURE — 2580000003 HC RX 258: Performed by: SURGERY

## 2022-11-18 PROCEDURE — 80048 BASIC METABOLIC PNL TOTAL CA: CPT

## 2022-11-18 PROCEDURE — 6360000002 HC RX W HCPCS: Performed by: SURGERY

## 2022-11-18 PROCEDURE — 6360000002 HC RX W HCPCS: Performed by: PHYSICIAN ASSISTANT

## 2022-11-18 PROCEDURE — 2500000003 HC RX 250 WO HCPCS: Performed by: SURGERY

## 2022-11-18 PROCEDURE — 2500000003 HC RX 250 WO HCPCS: Performed by: INTERNAL MEDICINE

## 2022-11-18 PROCEDURE — APPNB30 APP NON BILLABLE TIME 0-30 MINS: Performed by: NURSE PRACTITIONER

## 2022-11-18 PROCEDURE — 83735 ASSAY OF MAGNESIUM: CPT

## 2022-11-18 PROCEDURE — 36415 COLL VENOUS BLD VENIPUNCTURE: CPT

## 2022-11-18 PROCEDURE — 99232 SBSQ HOSP IP/OBS MODERATE 35: CPT | Performed by: SURGERY

## 2022-11-18 PROCEDURE — 85025 COMPLETE CBC W/AUTO DIFF WBC: CPT

## 2022-11-18 PROCEDURE — 1200000000 HC SEMI PRIVATE

## 2022-11-18 PROCEDURE — 74019 RADEX ABDOMEN 2 VIEWS: CPT

## 2022-11-18 PROCEDURE — 84100 ASSAY OF PHOSPHORUS: CPT

## 2022-11-18 RX ADMIN — METOCLOPRAMIDE 10 MG: 5 INJECTION, SOLUTION INTRAMUSCULAR; INTRAVENOUS at 08:02

## 2022-11-18 RX ADMIN — SODIUM PHOSPHATE, MONOBASIC, MONOHYDRATE 20 MMOL: 276; 142 INJECTION, SOLUTION INTRAVENOUS at 13:16

## 2022-11-18 RX ADMIN — PIPERACILLIN AND TAZOBACTAM 3375 MG: 3; .375 INJECTION, POWDER, FOR SOLUTION INTRAVENOUS at 06:31

## 2022-11-18 RX ADMIN — PIPERACILLIN AND TAZOBACTAM 3375 MG: 3; .375 INJECTION, POWDER, FOR SOLUTION INTRAVENOUS at 15:42

## 2022-11-18 RX ADMIN — METOCLOPRAMIDE 10 MG: 5 INJECTION, SOLUTION INTRAMUSCULAR; INTRAVENOUS at 13:16

## 2022-11-18 RX ADMIN — MORPHINE SULFATE 4 MG: 4 INJECTION, SOLUTION INTRAMUSCULAR; INTRAVENOUS at 08:03

## 2022-11-18 RX ADMIN — ENOXAPARIN SODIUM 40 MG: 100 INJECTION SUBCUTANEOUS at 08:01

## 2022-11-18 RX ADMIN — METOCLOPRAMIDE 10 MG: 5 INJECTION, SOLUTION INTRAMUSCULAR; INTRAVENOUS at 02:49

## 2022-11-18 RX ADMIN — Medication 10 ML: at 08:04

## 2022-11-18 RX ADMIN — POTASSIUM CHLORIDE, DEXTROSE MONOHYDRATE AND SODIUM CHLORIDE: 150; 5; 450 INJECTION, SOLUTION INTRAVENOUS at 05:16

## 2022-11-18 RX ADMIN — PIPERACILLIN AND TAZOBACTAM 3375 MG: 3; .375 INJECTION, POWDER, FOR SOLUTION INTRAVENOUS at 23:46

## 2022-11-18 RX ADMIN — POTASSIUM CHLORIDE, DEXTROSE MONOHYDRATE AND SODIUM CHLORIDE: 150; 5; 450 INJECTION, SOLUTION INTRAVENOUS at 20:44

## 2022-11-18 RX ADMIN — Medication 10 ML: at 20:05

## 2022-11-18 ASSESSMENT — PAIN SCALES - GENERAL
PAINLEVEL_OUTOF10: 0
PAINLEVEL_OUTOF10: 10

## 2022-11-18 ASSESSMENT — ENCOUNTER SYMPTOMS: SHORTNESS OF BREATH: 0

## 2022-11-18 NOTE — ANESTHESIA PRE PROCEDURE
Department of Anesthesiology  Preprocedure Note       Name:  Hillis Mortimer   Age:  79 y.o.  :  1940                                          MRN:  [id number]         Date:  2022      Surgeon: Marisa Mathur):  Domingo Prieto MD    Procedure: Procedure(s):  EXPLORATORY LAPAROTOMY, LYSIS OF ADHESIONS, POSSIBLE BOWEL RESECTION    Medications prior to admission:   Prior to Admission medications    Medication Sig Start Date End Date Taking?  Authorizing Provider   alendronate (FOSAMAX) 10 MG tablet  21   Historical Provider, MD   Cholecalciferol (VITAMIN D3 PO) Take 1,000 Units by mouth daily    Historical Provider, MD   CALCIUM CITRATE-VITAMIN D PO Take 4 capsules by mouth daily 800 mg a day    Historical Provider, MD   estradiol (ESTRACE) 0.1 MG/GM vaginal cream Place 2 g vaginally Twice a Week    Historical Provider, MD       Current medications:    Current Facility-Administered Medications   Medication Dose Route Frequency Provider Last Rate Last Admin    sodium phosphate 20 mmol in sodium chloride 0.9 % 250 mL IVPB  20 mmol IntraVENous Once Domingo Prieto MD        metoclopramide (REGLAN) injection 10 mg  10 mg IntraVENous Q6H Domingo Prieto MD   10 mg at 22 0802    piperacillin-tazobactam (ZOSYN) 3,375 mg in dextrose 5 % 50 mL IVPB extended infusion (mini-bag)  3,375 mg IntraVENous Q8H Domingo Prieto MD 12.5 mL/hr at 22 0631 3,375 mg at 22 0631    diazePAM (VALIUM) injection 2.5 mg  2.5 mg IntraVENous Q4H PRN Malcolm Crown, PA-C   2.5 mg at 22 0656    sodium chloride flush 0.9 % injection 5-40 mL  5-40 mL IntraVENous 2 times per day Malcolm Crown, PA-C   10 mL at 22 0804    sodium chloride flush 0.9 % injection 5-40 mL  5-40 mL IntraVENous PRN Malcolm Crown, PA-C   10 mL at 11/15/22 0645    0.9 % sodium chloride infusion   IntraVENous PRN Malcolm Crown, PA-C        ondansetron [facility]) injection 4 mg  4 mg IntraVENous Q6H PRN Malcolm Crown, PA-C        acetaminophen (TYLENOL) tablet 650 mg  650 mg Oral Q6H PRN Abdulkadir Morales, PA-C        Or    acetaminophen (TYLENOL) suppository 650 mg  650 mg Rectal Q6H PRN Abdulkadir Morales, PA-C        enoxaparin (LOVENOX) injection 40 mg  40 mg SubCUTAneous Daily Abdulkadir Morales, PA-C   40 mg at 11/18/22 0801    morphine (PF) injection 2 mg  2 mg IntraVENous Q3H PRN Abdulkadir Lessjerica, PA-C   2 mg at 11/16/22 2134    Or    morphine injection 4 mg  4 mg IntraVENous Q3H PRN Abdulkadir Lessjerica, PA-C   4 mg at 11/18/22 0803    dextrose 5 % and 0.45 % NaCl with KCl 20 mEq infusion   IntraVENous Continuous Kate Dhillon  mL/hr at 11/18/22 0516 New Bag at 11/18/22 0516       Allergies:  No Known Allergies    Problem List:    Patient Active Problem List   Diagnosis Code    Age-related osteoporosis without current pathological fracture M81.0    White coat syndrome without hypertension R03.0    SBO (small bowel obstruction) ([facility] [address] K56.609       Past Medical History:        Diagnosis Date    Age-related osteoporosis without current pathological fracture 11/05/2018    Arthritis     Osteoporosis        Past Surgical History:        Procedure Laterality Date    COLONOSCOPY      EYE SURGERY Right     cataract    HYSTERECTOMY (CERVIX STATUS UNKNOWN)  1984    OTHER SURGICAL HISTORY  2006    melanoma removed     SKIN BIOPSY     Daniel Dwyer Right     Dr. Nora Wild       Social History:    Social History     Tobacco Use    Smoking status: Never    Smokeless tobacco: Never   Substance Use Topics    Alcohol use:  No                                Counseling given: Not Answered      Vital Signs (Current):   Vitals:    11/17/22 2030 11/17/22 2332 11/18/22 0514 11/18/22 0745   BP: 116/64 135/78 129/72 121/65   Pulse: 97 96 92 85   Resp: 16 16 16 16   Temp: 36.8 °C (98.2 °F) 36.7 °C (98 °F) 36.7 °C (98 °F) 36.9 °C (98.4 °F)   TempSrc: Oral Oral Oral Oral   SpO2: 93% 91% 93% 90%   Weight:       Height: BP Readings from Last 3 Encounters:   11/18/22 121/65   07/29/22 115/70   06/08/22 (!) 150/72       NPO Status:                                                                                 BMI:   Wt Readings from Last 3 Encounters:   11/14/22 115 lb (52.2 kg)   07/29/22 119 lb 3.2 oz (54.1 kg)   06/08/22 116 lb 9.6 oz (52.9 kg)     Body mass index is 20.37 kg/m². CBC:   Lab Results   Component Value Date/Time    WBC 9.4 11/18/2022 07:44 AM    RBC 3.92 11/18/2022 07:44 AM    HGB 12.3 11/18/2022 07:44 AM    HCT 36.7 11/18/2022 07:44 AM    MCV 93.5 11/18/2022 07:44 AM    RDW 13.1 11/18/2022 07:44 AM     11/18/2022 07:44 AM       CMP:   Lab Results   Component Value Date/Time     11/18/2022 07:44 AM    K 4.0 11/18/2022 07:44 AM     11/18/2022 07:44 AM    CO2 26 11/18/2022 07:44 AM    BUN 24 11/18/2022 07:44 AM    CREATININE 0.7 11/18/2022 07:44 AM    GFRAA >60 02/04/2021 07:37 AM    GFRAA >60 09/20/2012 08:30 AM    AGRATIO 1.6 11/15/2022 04:26 AM    LABGLOM >60 11/18/2022 07:44 AM    GLUCOSE 154 11/18/2022 07:44 AM    PROT 6.2 11/15/2022 04:26 AM    PROT 7.5 09/20/2012 08:30 AM    CALCIUM 8.6 11/18/2022 07:44 AM    BILITOT 0.4 11/15/2022 04:26 AM    ALKPHOS 56 11/15/2022 04:26 AM    AST 22 11/15/2022 04:26 AM    ALT 12 11/15/2022 04:26 AM       POC Tests: No results for input(s): POCGLU, POCNA, POCK, POCCL, POCBUN, POCHEMO, POCHCT in the last 72 hours. Coags:   Lab Results   Component Value Date/Time    PROTIME 13.5 11/15/2022 04:26 AM    INR 1.04 11/15/2022 04:26 AM    APTT 26.9 11/15/2022 04:26 AM       HCG (If Applicable): No results found for: PREGTESTUR, PREGSERUM, HCG, HCGQUANT     ABGs: No results found for: PHART, PO2ART, NRK3FWA, JKU6WAJ, BEART, T5PSEXPR     Type & Screen (If Applicable):  No results found for: LABABO, LABRH    Drug/Infectious Status (If Applicable):  No results found for: HIV, HEPCAB    COVID-19 Screening (If Applicable):  No results found for: COVID19        Anesthesia Evaluation   no history of anesthetic complications:   Airway:           Dental:          Pulmonary:       (-) COPD, asthma and shortness of breath                           Cardiovascular:    (+) hypertension: moderate,     (-)  CHF    ECG reviewed  Rhythm: regular  Rate: normal  Echocardiogram reviewed               ROS comment: ECHO 3/13/2020   Summary   -Normal left ventricle size, wall thickness, and systolic function with an   estimated ejection fraction of 55-60%. Grade I diastolic dysfunction with   normal LV filling pressures. -Mild mitral regurgitation.   -Mild aortic regurgitation.   -Mild tricuspid regurgitation with PASP of 29 mmHg. Neuro/Psych:      (-) seizures and headaches           GI/Hepatic/Renal:             Endo/Other:    (+) : arthritis: OA., .    (-) diabetes mellitus               Abdominal:             Vascular:     - PVD and DVT. Other Findings:           Anesthesia Plan      general     ASA 3       Induction: intravenous. MIPS: Postoperative opioids intended and Prophylactic antiemetics administered.                   Chart Review      WIL Reed - CRNA   11/18/2022

## 2022-11-18 NOTE — PROGRESS NOTES
Shift assessment completed. Routine vitals taken. Scheduled medications  and PRN Morphine given. Patient is in bed resting, alert and oriented. Respirations are easy and unlabored. Patient does not appear to be in distress, resting comfortably at this time. Call light within reach. Falls precautions in place.

## 2022-11-18 NOTE — PROGRESS NOTES
Hospitalist Progress Note      PCP: Jeffery Bower MD    Date of Admission: 11/14/2022    Chief Complaint: Abd pain and nausea    Hospital Course: admitted with partial SBO. General surgery consulted. Conservative management with NG tube failed. Surgery planned tomorrow    Subjective: Abdominal tenderness continues. NG suctioning ongoing. Family at bedside. Medications:  Reviewed    Infusion Medications    sodium chloride      dextrose 5% and 0.45% NaCl with KCl 20 mEq 125 mL/hr at 11/18/22 0516     Scheduled Medications    sodium phosphate IVPB  20 mmol IntraVENous Once    metoclopramide  10 mg IntraVENous Q6H    piperacillin-tazobactam  3,375 mg IntraVENous Q8H    sodium chloride flush  5-40 mL IntraVENous 2 times per day    enoxaparin  40 mg SubCUTAneous Daily     PRN Meds: diazePAM, sodium chloride flush, sodium chloride, ondansetron, acetaminophen **OR** acetaminophen, morphine **OR** morphine      Intake/Output Summary (Last 24 hours) at 11/18/2022 1411  Last data filed at 11/18/2022 1339  Gross per 24 hour   Intake --   Output 900 ml   Net -900 ml       Physical Exam Performed:    /82   Pulse 96   Temp 99 °F (37.2 °C) (Oral)   Resp 16   Ht 5' 3\" (1.6 m)   Wt 115 lb (52.2 kg)   SpO2 91%   BMI 20.37 kg/m²     General appearance: No apparent distress, appears stated age and cooperative. fatigued overall. NG in place. HEENT: Pupils equal, round, and reactive to light. Conjunctivae/corneas clear. Neck: Supple, with full range of motion. No jugular venous distention. Trachea midline. Respiratory:  Normal respiratory effort. Clear to auscultation, bilaterally without Rales/Wheezes/Rhonchi. Cardiovascular: Regular rate and rhythm with normal S1/S2 without murmurs, rubs or gallops. Abdomen: Soft,   moderate diffuse abd tenderness to palpation. Non-distended with normal bowel sounds. Musculoskeletal: No clubbing, cyanosis or edema bilaterally.   Full range of motion without deformity. Skin: Skin color, texture, turgor normal.  No rashes or lesions. Neurologic:  Neurovascularly intact without any focal sensory/motor deficits. Cranial nerves: II-XII intact, grossly non-focal.  Psychiatric: Alert and oriented, thought content appropriate, normal insight  Capillary Refill: Brisk, 3 seconds, normal   Peripheral Pulses: +2 palpable, equal bilaterally       Labs:   Recent Labs     11/16/22  0538 11/17/22  0823 11/18/22  0744   WBC 17.9* 19.0* 9.4   HGB 11.7* 13.1 12.3   HCT 34.7* 39.7 36.7    229 244     Recent Labs     11/16/22  0538 11/17/22  0823 11/18/22  0744    138 140   K 4.1 4.2 4.0    104 106   CO2 23 23 26   BUN 15 21* 24*   CREATININE <0.5* 0.5* 0.7   CALCIUM 8.7 9.1 8.6   PHOS 1.7* 1.8* 1.9*     No results for input(s): AST, ALT, BILIDIR, BILITOT, ALKPHOS in the last 72 hours. No results for input(s): INR in the last 72 hours. No results for input(s): [place] in the last 72 hours. Urinalysis:      Lab Results   Component Value Date/Time    NITRU Negative 11/17/2022 07:15 PM    WBCUA 14 11/17/2022 07:15 PM    BACTERIA 2+ 11/17/2022 07:15 PM    RBCUA 8 11/17/2022 07:15 PM    BLOODU MODERATE 11/17/2022 07:15 PM    SPECGRAV >=1.030 11/17/2022 07:15 PM    GLUCOSEU Negative 11/17/2022 07:15 PM       Radiology:  XR ABDOMEN (2 VIEWS)   Final Result   Mechanical obstruction at the distal 3rd of the small bowel. Concomitant   small bowel ileus. RECOMMENDATION:   An additional delay radiograph of lower abdomen and pelvis at 24 hours from   this exam 2 determine the exact level in the distal 3rd of the small bowel   where there is an obstruction. XR ABDOMEN (KUB) (SINGLE AP VIEW)   Final Result   Distal small-bowel obstruction pattern. It would be helpful to position the patient right lateral decubitus, in order   to encourage gastric emptying.          FL SMALL BOWEL FOLLOW THROUGH ONLY   Final Result   Small-bowel follow-through study showing significant retention of contrast in   the lumen of the stomach and only a small amount of transit into the small   bowel that is very dilute. Pattern may represent a partial small bowel   obstruction. Gastroparesis or ileus may also be considered. XR ABDOMEN FOR NG/OG/NE TUBE PLACEMENT   Final Result   Looped configuration of nasogastric tube, coiled at the stomach and   redirected to the midesophagus level. Withdrawal of 20 cm is suggested,   followed by readvancement. XR ABDOMEN FOR NG/OG/NE TUBE PLACEMENT   Final Result   Nasogastric tube projects in normal position. CT ABDOMEN PELVIS W IV CONTRAST Additional Contrast? None   Final Result   1. Loops of mildly dilated fluid-filled small bowel in the pelvis which   appear to represent ileum. No clear transition point is seen. Air is still   seen distally in the colon and the proximal duodenal and jejunal loops do not   appear dilated. This likely represents a partial small bowel obstruction. 2.  Colonic diverticulosis without evidence of diverticulitis. XR CHEST PORTABLE   Final Result   No acute cardiopulmonary disease             Assessment/Plan:    Active Hospital Problems    Diagnosis     SBO (small bowel obstruction) ([facility] [address] [K56.609]      Priority: Medium     Partial small bowel obstruction: CT abdomen noted. General surgery consulted. NG tube placed , to suction. Small bowel follow-through   and subsequent KUB showed distal small bowel obstruction. Started on Reglan . No improvement so far. Exploratory laparotomy and lysis of adhesion  planned tomorrow. Continue n.p.o., IV fluids, NG suctioning. Lactic acid elevation:   Normalized with IV fluids. Leukocytosis: Started on Zosyn yesterday. Urinalysis positive. Urine cultures pending. Leukocytosis resolved.     Hypophosphatemia: Replaced    DVT Prophylaxis: Lovenox  Diet: Diet NPO  Code Status: Full Code  PT/OT Eval Status:      Dispo: inpt 2 days at least.       Efrain [facility] [place]

## 2022-11-18 NOTE — PROGRESS NOTES
[facility]  Progress Note    Pt Name: Scherrie Babinski  MRN: [id number]  YOB: 1940  Date of Evaluation: 2022    Chief Complaint: Abdominal pain      Subjective:    No acute events overnight  Minimally verbal, reports abdominal pain as about the same as yesterday  No nausea/vomiting, NGT in place with high output  No flatus or stool  Resting in bed at this time      Vital Signs:  Patient Vitals for the past 24 hrs:   BP Temp Temp src Pulse Resp SpO2   22 1307 128/82 99 °F (37.2 °C) Oral 96 16 91 %   22 0745 121/65 98.4 °F (36.9 °C) Oral 85 16 90 %   22 0514 129/72 98 °F (36.7 °C) Oral 92 16 93 %   22 2332 135/78 98 °F (36.7 °C) Oral 96 16 91 %   22 2030 116/64 98.2 °F (36.8 °C) Oral 97 16 93 %   22 1545 128/81 98.1 °F (36.7 °C) Oral (!) 104 18 93 %        TEMPERATURE HISTORY 24H: Temp (24hrs), Av.3 °F (36.8 °C), Min:98 °F (36.7 °C), Max:99 °F (37.2 °C)    BLOOD PRESSURE HISTORY: Systolic (86MDD), EMV:149 , Min:116 , ZPH:688    Diastolic (87SMU), CXL:85, Min:64, Max:85      Intake/Output:    I/O last 3 completed shifts:   In: 0   Out: 2700 [Emesis/NG output:2700]  I/O this shift:  In: -   Out: 200 [Urine:200]  Drain/tube Output:           Physical Exam:  General: awake, alert, no acute distress, confused at baseline and minimally conversant  Cardiac: regular rate and rhythm   Pulmonary: clear to auscultation bilaterally   Abdomen: soft, non-distended, poorly localized lower abdominal tenderness, hypoactive bowel sounds    Labs:  CBC:    Recent Labs     22  0538 22  0823 22  0744   WBC 17.9* 19.0* 9.4   HGB 11.7* 13.1 12.3   HCT 34.7* 39.7 36.7    229 244       BMP:    Recent Labs     22  0538 22  0823 22  0744    138 140   K 4.1 4.2 4.0    104 106   CO2 23 23 26   BUN 15 21* 24*   CREATININE <0.5* 0.5* 0.7   GLUCOSE 145* 151* 154*       Hepatic:   No results for input(s): AST, ALT, ALB, BILITOT, ALKPHOS in the last 72 hours. Amylase: No results for input(s): AMYLASE in the last 72 hours. Lipase:   No results for input(s): LIPASE in the last 72 hours. Mag:      Recent Labs     11/16/22 0538 11/17/22 0823 11/18/22 0744   MG 2.00 2.10 2.30        Phos:     Recent Labs     11/16/22 0538 11/17/22 0823 11/18/22  0744   PHOS 1.7* 1.8* 1.9*        Coags:   No results for input(s): INR, APTT in the last 72 hours. Cultures:  Relevant cultures documented under results section     Pathology:   No relevant pathology     Imaging:  I have personally reviewed the following films:    XR ABDOMEN (KUB) (SINGLE AP VIEW)    Result Date: 11/17/2022  EXAMINATION: ONE SUPINE XRAY VIEW(S) OF THE ABDOMEN 11/17/2022 9:26 am COMPARISON: 11/16/2022 small-bowel follow-through 11/14/2022 CT abdomen and pelvis HISTORY: ORDERING SYSTEM PROVIDED HISTORY: SBO/ileus TECHNOLOGIST PROVIDED HISTORY: Reason for exam:->SBO/ileus Reason for Exam: SBO/ileus FINDINGS: Nasogastric tube remains intragastric, with portion coiled in the fundus and distal tip at the level of the gastric body. Contrast remains pooled in the gastric fundus. There is dilute contrast in multiple loops of small bowel which remain dilated. No passage into the colon is identified. Distal small-bowel obstruction pattern. It would be helpful to position the patient right lateral decubitus, in order to encourage gastric emptying. FL SMALL BOWEL FOLLOW THROUGH ONLY    Result Date: 11/17/2022  EXAMINATION: SMALL BOWEL FOLLOW THROUGH SERIES 11/16/2022 TECHNIQUE: Small bowel follow through series was performed with overhead images and spot images. FLUOROSCOPY DOSE AND TYPE OR TIME AND EXPOSURES: No fluoroscopy utilized.  COMPARISON: None HISTORY: ORDERING SYSTEM PROVIDED HISTORY: SBO followup, water soluble contrast to be given through NG TECHNOLOGIST PROVIDED HISTORY: Reason for exam:->SBO followup, water soluble contrast to be given through NG Reason for Exam: SBO followup, water soluble contrast to be given through NG FINDINGS:  imaging of the abdomen shows multiple dilated loops of air-filled small bowel diffusely. Water-soluble contrast was administered through an enteric tube in the lumen of the stomach. Of note, the tube is coiled and side holes and tip extend back superiorly into the distal esophagus. As result, there is significant contrast within the esophagus on initial image. Subsequent images show concentration of contrast within the stomach. At 90 minutes, very minimal contrast has passed into the jejunum. At 2 hours 30 minutes, there is significant concentration of contrast within the stomach with slightly more contrast in the jejunum. Imaging was performed through 9 hours, at which time there is still significant concentration in the stomach with a dilute amount of contrast in the jejunum. Small-bowel follow-through study showing significant retention of contrast in the lumen of the stomach and only a small amount of transit into the small bowel that is very dilute. Pattern may represent a partial small bowel obstruction. Gastroparesis or ileus may also be considered. XR ABDOMEN (2 VIEWS)    Result Date: 11/18/2022  EXAMINATION: TWO XRAY VIEWS OF THE ABDOMEN 11/18/2022 10:45 am COMPARISON: None. HISTORY: ORDERING SYSTEM PROVIDED HISTORY: F/u SBO TECHNOLOGIST PROVIDED HISTORY: Reason for exam:->F/u SBO Reason for Exam: abdominal pain FINDINGS: This study is done at 48 hours after initiation of the small bowel opacification procedure peer there is an NG tube looped/coiled in the proximal stomach and the tip of this 2 is in the mid body of the stomach. The stomach is nondistended and contains no positive contrast material. There is water-soluble contrast in the slightly dilated proximal small bowel. The maximal opacified small bowel diameter is 3.8 cm.   There is no opacification of the intestinal tract distal to the middle 3rd of the small bowel. There is no large bowel dilatation. Mechanical obstruction at the distal 3rd of the small bowel. Concomitant small bowel ileus. RECOMMENDATION: An additional delay radiograph of lower abdomen and pelvis at 24 hours from this exam 2 determine the exact level in the distal 3rd of the small bowel where there is an obstruction. Scheduled Meds:   sodium phosphate IVPB  20 mmol IntraVENous Once    metoclopramide  10 mg IntraVENous Q6H    piperacillin-tazobactam  3,375 mg IntraVENous Q8H    sodium chloride flush  5-40 mL IntraVENous 2 times per day    enoxaparin  40 mg SubCUTAneous Daily     Continuous Infusions:   sodium chloride      dextrose 5% and 0.45% NaCl with KCl 20 mEq 125 mL/hr at 11/18/22 0516     PRN Meds:.diazePAM, sodium chloride flush, sodium chloride, ondansetron, acetaminophen **OR** acetaminophen, morphine **OR** morphine      Assessment:  Patient Active Problem List   Diagnosis    Age-related osteoporosis without current pathological fracture    White coat syndrome without hypertension    SBO (small bowel obstruction) (HCC)     Small bowel obstruction      Plan:  1. No significant improvement, reports pain as unchanged, no nausea/vomiting with NGT in place but output is high, no bowel function, vitals stable, leukocytosis resolved but 37% bandemia; continued supportive care, tentatively planning for exploratory laparotomy tomorrow with Dr. Sophia Mya  2. NPO with NGT decompression; monitor bowel function  3. IV hydration; monitor and correct electrolytes  4. Antibiotics  5. Activity as tolerated  6. PRN analgesics and antiemetics--minimizing narcotics as tolerated  7. Management of medical comorbid etiologies per primary team and consulting services    EDUCATION:  Educated patient/ on plan of care and disease process--all questions answered. Plans discussed with patient/ and nursing. Reviewed and discussed with Dr. Sophia May.       Signed:  Fidelina Kinsey. Mady Ruggiero, WIL - CNP  11/18/2022 3:13 PM    I have personally performed a face to face diagnostic evaluation on this patient. I have interviewed and examined the patient and I agree with the assessment above. Time was spent reviewing patient chart (including but not limited to notes, labs, imaging and other testing), interviewing and counseling patient and present family members, performing physical exam, documentation of my findings and subsequent follow up of ordered medication and testing, placing referrals and communication with patient care providers, coordinating future care as well as documentation in the EHR. This encompassed more than 50% of the total encounter time. In summary, my findings and plan are the following:   Ms. Markell Hardy is a 79 y.o. female who presents with   Small bowel obstruction     Plan:  1. Small bowel obstruction likely from adhesive disease. Most respond to conservative management but clinically and radiographically she is still obstructed. Unless significantly improved by tomorrow, will need surgical intervention. Discussed with patient with limited capacity from confusion, discussed with  and daughter. Risks include but not limited to bleeding, infection, damage to surrounding structures, need for additional procedures as well as the risks associated with general anesthesia itself. Details of procedure discussed and all questions answered. Understands, agrees, wishes to proceed. Tentatively on OR schedule for tomorrow for exploratory laparotomy, lysis of adhesions, possible bowel resection. 2. NG decompression, bowel rest  3. Monitor bowel function, no flatus or stool since admission  4. IVF, monitor and replace electrolytes as needed  5. Perioperative antibiotics  6. Pain medication and antiemetics as needed with caution as they may mask a worsening exam and alter bowel function  7.  Defer management of remainder of medical comorbidities to primary and consulting teams    Richard Nathaniel Delatorre MD, FACS  11/18/2022  3:33 PM

## 2022-11-19 ENCOUNTER — ANESTHESIA (OUTPATIENT)
Dept: OPERATING ROOM | Age: 82
DRG: 329 | End: 2022-11-19
Payer: COMMERCIAL

## 2022-11-19 LAB
ANION GAP SERPL CALCULATED.3IONS-SCNC: 8 MMOL/L (ref 3–16)
BANDED NEUTROPHILS RELATIVE PERCENT: 28 % (ref 0–7)
BASOPHILS ABSOLUTE: 0 K/UL (ref 0–0.2)
BASOPHILS RELATIVE PERCENT: 0 %
BUN BLDV-MCNC: 19 MG/DL (ref 7–20)
CALCIUM SERPL-MCNC: 8.5 MG/DL (ref 8.3–10.6)
CHLORIDE BLD-SCNC: 107 MMOL/L (ref 99–110)
CO2: 28 MMOL/L (ref 21–32)
CREAT SERPL-MCNC: 0.6 MG/DL (ref 0.6–1.2)
EOSINOPHILS ABSOLUTE: 0.1 K/UL (ref 0–0.6)
EOSINOPHILS RELATIVE PERCENT: 1 %
GFR SERPL CREATININE-BSD FRML MDRD: >60 ML/MIN/{1.73_M2}
GLUCOSE BLD-MCNC: 135 MG/DL (ref 70–99)
HCT VFR BLD CALC: 35.7 % (ref 36–48)
HEMOGLOBIN: 11.9 G/DL (ref 12–16)
LYMPHOCYTES ABSOLUTE: 0.7 K/UL (ref 1–5.1)
LYMPHOCYTES RELATIVE PERCENT: 7 %
MCH RBC QN AUTO: 31.5 PG (ref 26–34)
MCHC RBC AUTO-ENTMCNC: 33.4 G/DL (ref 31–36)
MCV RBC AUTO: 94.4 FL (ref 80–100)
METAMYELOCYTES RELATIVE PERCENT: 4 %
MONOCYTES ABSOLUTE: 0.5 K/UL (ref 0–1.3)
MONOCYTES RELATIVE PERCENT: 5 %
NEUTROPHILS ABSOLUTE: 8.1 K/UL (ref 1.7–7.7)
NEUTROPHILS RELATIVE PERCENT: 55 %
PDW BLD-RTO: 13.6 % (ref 12.4–15.4)
PLATELET # BLD: 245 K/UL (ref 135–450)
PMV BLD AUTO: 8.2 FL (ref 5–10.5)
POTASSIUM SERPL-SCNC: 3.6 MMOL/L (ref 3.5–5.1)
RBC # BLD: 3.78 M/UL (ref 4–5.2)
SODIUM BLD-SCNC: 143 MMOL/L (ref 136–145)
WBC # BLD: 9.3 K/UL (ref 4–11)

## 2022-11-19 PROCEDURE — 1200000000 HC SEMI PRIVATE

## 2022-11-19 PROCEDURE — 6370000000 HC RX 637 (ALT 250 FOR IP): Performed by: SURGERY

## 2022-11-19 PROCEDURE — 6360000002 HC RX W HCPCS: Performed by: SURGERY

## 2022-11-19 PROCEDURE — 2500000003 HC RX 250 WO HCPCS: Performed by: ANESTHESIOLOGY

## 2022-11-19 PROCEDURE — 2580000003 HC RX 258: Performed by: SURGERY

## 2022-11-19 PROCEDURE — 3600000004 HC SURGERY LEVEL 4 BASE: Performed by: SURGERY

## 2022-11-19 PROCEDURE — 2500000003 HC RX 250 WO HCPCS: Performed by: INTERNAL MEDICINE

## 2022-11-19 PROCEDURE — 6360000002 HC RX W HCPCS: Performed by: ANESTHESIOLOGY

## 2022-11-19 PROCEDURE — 85025 COMPLETE CBC W/AUTO DIFF WBC: CPT

## 2022-11-19 PROCEDURE — 94150 VITAL CAPACITY TEST: CPT

## 2022-11-19 PROCEDURE — 0DT80ZZ RESECTION OF SMALL INTESTINE, OPEN APPROACH: ICD-10-PCS | Performed by: SURGERY

## 2022-11-19 PROCEDURE — 36415 COLL VENOUS BLD VENIPUNCTURE: CPT

## 2022-11-19 PROCEDURE — C1729 CATH, DRAINAGE: HCPCS | Performed by: SURGERY

## 2022-11-19 PROCEDURE — 2720000010 HC SURG SUPPLY STERILE: Performed by: SURGERY

## 2022-11-19 PROCEDURE — 2700000000 HC OXYGEN THERAPY PER DAY

## 2022-11-19 PROCEDURE — 3700000001 HC ADD 15 MINUTES (ANESTHESIA): Performed by: SURGERY

## 2022-11-19 PROCEDURE — C9113 INJ PANTOPRAZOLE SODIUM, VIA: HCPCS | Performed by: SURGERY

## 2022-11-19 PROCEDURE — 88307 TISSUE EXAM BY PATHOLOGIST: CPT

## 2022-11-19 PROCEDURE — 2709999900 HC NON-CHARGEABLE SUPPLY: Performed by: SURGERY

## 2022-11-19 PROCEDURE — 0DTH0ZZ RESECTION OF CECUM, OPEN APPROACH: ICD-10-PCS | Performed by: SURGERY

## 2022-11-19 PROCEDURE — 7100000001 HC PACU RECOVERY - ADDTL 15 MIN: Performed by: SURGERY

## 2022-11-19 PROCEDURE — 80048 BASIC METABOLIC PNL TOTAL CA: CPT

## 2022-11-19 PROCEDURE — 2580000003 HC RX 258: Performed by: ANESTHESIOLOGY

## 2022-11-19 PROCEDURE — 3700000000 HC ANESTHESIA ATTENDED CARE: Performed by: SURGERY

## 2022-11-19 PROCEDURE — 7100000000 HC PACU RECOVERY - FIRST 15 MIN: Performed by: SURGERY

## 2022-11-19 PROCEDURE — 2580000003 HC RX 258: Performed by: PHYSICIAN ASSISTANT

## 2022-11-19 PROCEDURE — 3600000014 HC SURGERY LEVEL 4 ADDTL 15MIN: Performed by: SURGERY

## 2022-11-19 PROCEDURE — 94760 N-INVAS EAR/PLS OXIMETRY 1: CPT

## 2022-11-19 PROCEDURE — 44160 REMOVAL OF COLON: CPT | Performed by: SURGERY

## 2022-11-19 RX ORDER — LIDOCAINE HYDROCHLORIDE 10 MG/ML
1 INJECTION, SOLUTION EPIDURAL; INFILTRATION; INTRACAUDAL; PERINEURAL
Status: DISCONTINUED | OUTPATIENT
Start: 2022-11-19 | End: 2022-11-19 | Stop reason: HOSPADM

## 2022-11-19 RX ORDER — PROCHLORPERAZINE EDISYLATE 5 MG/ML
5 INJECTION INTRAMUSCULAR; INTRAVENOUS
Status: DISCONTINUED | OUTPATIENT
Start: 2022-11-19 | End: 2022-11-19 | Stop reason: HOSPADM

## 2022-11-19 RX ORDER — VASOPRESSIN 20 U/ML
INJECTION PARENTERAL PRN
Status: DISCONTINUED | OUTPATIENT
Start: 2022-11-19 | End: 2022-11-19 | Stop reason: SDUPTHER

## 2022-11-19 RX ORDER — FENTANYL CITRATE 50 UG/ML
INJECTION, SOLUTION INTRAMUSCULAR; INTRAVENOUS PRN
Status: DISCONTINUED | OUTPATIENT
Start: 2022-11-19 | End: 2022-11-19 | Stop reason: SDUPTHER

## 2022-11-19 RX ORDER — SODIUM CHLORIDE 0.9 % (FLUSH) 0.9 %
5-40 SYRINGE (ML) INJECTION PRN
Status: DISCONTINUED | OUTPATIENT
Start: 2022-11-19 | End: 2022-11-28 | Stop reason: HOSPADM

## 2022-11-19 RX ORDER — ENOXAPARIN SODIUM 100 MG/ML
40 INJECTION SUBCUTANEOUS DAILY
Status: DISCONTINUED | OUTPATIENT
Start: 2022-11-19 | End: 2022-11-28 | Stop reason: HOSPADM

## 2022-11-19 RX ORDER — OXYCODONE HYDROCHLORIDE 5 MG/1
10 TABLET ORAL EVERY 4 HOURS PRN
Status: DISCONTINUED | OUTPATIENT
Start: 2022-11-19 | End: 2022-11-28 | Stop reason: HOSPADM

## 2022-11-19 RX ORDER — OXYCODONE HYDROCHLORIDE 5 MG/1
5 TABLET ORAL EVERY 4 HOURS PRN
Status: DISCONTINUED | OUTPATIENT
Start: 2022-11-19 | End: 2022-11-28 | Stop reason: HOSPADM

## 2022-11-19 RX ORDER — ONDANSETRON 2 MG/ML
4 INJECTION INTRAMUSCULAR; INTRAVENOUS
Status: DISCONTINUED | OUTPATIENT
Start: 2022-11-19 | End: 2022-11-19 | Stop reason: HOSPADM

## 2022-11-19 RX ORDER — PROPOFOL 10 MG/ML
INJECTION, EMULSION INTRAVENOUS PRN
Status: DISCONTINUED | OUTPATIENT
Start: 2022-11-19 | End: 2022-11-19 | Stop reason: SDUPTHER

## 2022-11-19 RX ORDER — SODIUM CHLORIDE 9 MG/ML
INJECTION, SOLUTION INTRAVENOUS CONTINUOUS PRN
Status: DISCONTINUED | OUTPATIENT
Start: 2022-11-19 | End: 2022-11-19 | Stop reason: SDUPTHER

## 2022-11-19 RX ORDER — SODIUM CHLORIDE 0.9 % (FLUSH) 0.9 %
5-40 SYRINGE (ML) INJECTION EVERY 12 HOURS SCHEDULED
Status: DISCONTINUED | OUTPATIENT
Start: 2022-11-19 | End: 2022-11-28 | Stop reason: HOSPADM

## 2022-11-19 RX ORDER — HYDRALAZINE HYDROCHLORIDE 20 MG/ML
10 INJECTION INTRAMUSCULAR; INTRAVENOUS
Status: DISCONTINUED | OUTPATIENT
Start: 2022-11-19 | End: 2022-11-19 | Stop reason: HOSPADM

## 2022-11-19 RX ORDER — LABETALOL HYDROCHLORIDE 5 MG/ML
10 INJECTION, SOLUTION INTRAVENOUS
Status: DISCONTINUED | OUTPATIENT
Start: 2022-11-19 | End: 2022-11-19 | Stop reason: HOSPADM

## 2022-11-19 RX ORDER — ROCURONIUM BROMIDE 10 MG/ML
INJECTION, SOLUTION INTRAVENOUS PRN
Status: DISCONTINUED | OUTPATIENT
Start: 2022-11-19 | End: 2022-11-19 | Stop reason: SDUPTHER

## 2022-11-19 RX ORDER — HYDROMORPHONE HCL 110MG/55ML
0.25 PATIENT CONTROLLED ANALGESIA SYRINGE INTRAVENOUS EVERY 5 MIN PRN
Status: COMPLETED | OUTPATIENT
Start: 2022-11-19 | End: 2022-11-19

## 2022-11-19 RX ORDER — MORPHINE SULFATE 2 MG/ML
2 INJECTION, SOLUTION INTRAMUSCULAR; INTRAVENOUS
Status: DISCONTINUED | OUTPATIENT
Start: 2022-11-19 | End: 2022-11-28 | Stop reason: HOSPADM

## 2022-11-19 RX ORDER — SODIUM CHLORIDE 9 MG/ML
INJECTION, SOLUTION INTRAVENOUS CONTINUOUS
Status: DISCONTINUED | OUTPATIENT
Start: 2022-11-19 | End: 2022-11-20

## 2022-11-19 RX ORDER — PANTOPRAZOLE SODIUM 40 MG/10ML
40 INJECTION, POWDER, LYOPHILIZED, FOR SOLUTION INTRAVENOUS DAILY
Status: DISCONTINUED | OUTPATIENT
Start: 2022-11-19 | End: 2022-11-28 | Stop reason: HOSPADM

## 2022-11-19 RX ORDER — PHENYLEPHRINE HCL IN 0.9% NACL 1 MG/10 ML
SYRINGE (ML) INTRAVENOUS PRN
Status: DISCONTINUED | OUTPATIENT
Start: 2022-11-19 | End: 2022-11-19 | Stop reason: SDUPTHER

## 2022-11-19 RX ORDER — ONDANSETRON 2 MG/ML
INJECTION INTRAMUSCULAR; INTRAVENOUS PRN
Status: DISCONTINUED | OUTPATIENT
Start: 2022-11-19 | End: 2022-11-19 | Stop reason: SDUPTHER

## 2022-11-19 RX ORDER — FENTANYL CITRATE 50 UG/ML
25 INJECTION, SOLUTION INTRAMUSCULAR; INTRAVENOUS EVERY 5 MIN PRN
Status: DISCONTINUED | OUTPATIENT
Start: 2022-11-19 | End: 2022-11-19 | Stop reason: HOSPADM

## 2022-11-19 RX ORDER — DEXAMETHASONE SODIUM PHOSPHATE 4 MG/ML
INJECTION, SOLUTION INTRA-ARTICULAR; INTRALESIONAL; INTRAMUSCULAR; INTRAVENOUS; SOFT TISSUE PRN
Status: DISCONTINUED | OUTPATIENT
Start: 2022-11-19 | End: 2022-11-19 | Stop reason: SDUPTHER

## 2022-11-19 RX ORDER — SODIUM CHLORIDE 9 MG/ML
INJECTION, SOLUTION INTRAVENOUS PRN
Status: DISCONTINUED | OUTPATIENT
Start: 2022-11-19 | End: 2022-11-28 | Stop reason: HOSPADM

## 2022-11-19 RX ORDER — LIDOCAINE HYDROCHLORIDE 20 MG/ML
INJECTION, SOLUTION EPIDURAL; INFILTRATION; INTRACAUDAL; PERINEURAL PRN
Status: DISCONTINUED | OUTPATIENT
Start: 2022-11-19 | End: 2022-11-19 | Stop reason: SDUPTHER

## 2022-11-19 RX ORDER — ACETAMINOPHEN 325 MG/1
650 TABLET ORAL EVERY 4 HOURS PRN
Status: DISCONTINUED | OUTPATIENT
Start: 2022-11-19 | End: 2022-11-21

## 2022-11-19 RX ORDER — ONDANSETRON 2 MG/ML
4 INJECTION INTRAMUSCULAR; INTRAVENOUS EVERY 6 HOURS PRN
Status: DISCONTINUED | OUTPATIENT
Start: 2022-11-19 | End: 2022-11-28 | Stop reason: HOSPADM

## 2022-11-19 RX ORDER — MORPHINE SULFATE 4 MG/ML
4 INJECTION, SOLUTION INTRAMUSCULAR; INTRAVENOUS
Status: DISCONTINUED | OUTPATIENT
Start: 2022-11-19 | End: 2022-11-28 | Stop reason: HOSPADM

## 2022-11-19 RX ORDER — SUCCINYLCHOLINE/SOD CL,ISO/PF 200MG/10ML
SYRINGE (ML) INTRAVENOUS PRN
Status: DISCONTINUED | OUTPATIENT
Start: 2022-11-19 | End: 2022-11-19 | Stop reason: SDUPTHER

## 2022-11-19 RX ORDER — ONDANSETRON 4 MG/1
4 TABLET, ORALLY DISINTEGRATING ORAL EVERY 8 HOURS PRN
Status: DISCONTINUED | OUTPATIENT
Start: 2022-11-19 | End: 2022-11-28 | Stop reason: HOSPADM

## 2022-11-19 RX ORDER — MAGNESIUM HYDROXIDE 1200 MG/15ML
LIQUID ORAL CONTINUOUS PRN
Status: COMPLETED | OUTPATIENT
Start: 2022-11-19 | End: 2022-11-19

## 2022-11-19 RX ADMIN — HYDROMORPHONE HYDROCHLORIDE 0.25 MG: 2 INJECTION, SOLUTION INTRAMUSCULAR; INTRAVENOUS; SUBCUTANEOUS at 10:40

## 2022-11-19 RX ADMIN — PANTOPRAZOLE SODIUM 40 MG: 40 INJECTION, POWDER, FOR SOLUTION INTRAVENOUS at 13:05

## 2022-11-19 RX ADMIN — PROPOFOL 50 MG: 10 INJECTION, EMULSION INTRAVENOUS at 08:26

## 2022-11-19 RX ADMIN — Medication 100 MCG: at 08:26

## 2022-11-19 RX ADMIN — DEXAMETHASONE SODIUM PHOSPHATE 4 MG: 4 INJECTION, SOLUTION INTRAMUSCULAR; INTRAVENOUS at 08:26

## 2022-11-19 RX ADMIN — ENOXAPARIN SODIUM 40 MG: 100 INJECTION SUBCUTANEOUS at 15:38

## 2022-11-19 RX ADMIN — Medication 200 MCG: at 08:32

## 2022-11-19 RX ADMIN — SODIUM CHLORIDE: 9 INJECTION, SOLUTION INTRAVENOUS at 13:09

## 2022-11-19 RX ADMIN — POTASSIUM CHLORIDE, DEXTROSE MONOHYDRATE AND SODIUM CHLORIDE: 150; 5; 450 INJECTION, SOLUTION INTRAVENOUS at 11:22

## 2022-11-19 RX ADMIN — Medication 10 ML: at 10:09

## 2022-11-19 RX ADMIN — ONDANSETRON 4 MG: 2 INJECTION INTRAMUSCULAR; INTRAVENOUS at 08:26

## 2022-11-19 RX ADMIN — HYDROMORPHONE HYDROCHLORIDE 0.25 MG: 2 INJECTION, SOLUTION INTRAMUSCULAR; INTRAVENOUS; SUBCUTANEOUS at 10:20

## 2022-11-19 RX ADMIN — VASOPRESSIN 1 UNITS: 20 INJECTION INTRAVENOUS at 08:35

## 2022-11-19 RX ADMIN — HYDROMORPHONE HYDROCHLORIDE 0.25 MG: 2 INJECTION, SOLUTION INTRAMUSCULAR; INTRAVENOUS; SUBCUTANEOUS at 10:30

## 2022-11-19 RX ADMIN — FENTANYL CITRATE 25 MCG: 50 INJECTION, SOLUTION INTRAMUSCULAR; INTRAVENOUS at 08:44

## 2022-11-19 RX ADMIN — ROCURONIUM BROMIDE 30 MG: 10 INJECTION, SOLUTION INTRAVENOUS at 08:38

## 2022-11-19 RX ADMIN — SODIUM CHLORIDE: 9 INJECTION, SOLUTION INTRAVENOUS at 08:15

## 2022-11-19 RX ADMIN — PIPERACILLIN AND TAZOBACTAM 3375 MG: 3; .375 INJECTION, POWDER, FOR SOLUTION INTRAVENOUS at 23:07

## 2022-11-19 RX ADMIN — Medication 100 MCG: at 08:42

## 2022-11-19 RX ADMIN — SUGAMMADEX 120 MG: 100 INJECTION, SOLUTION INTRAVENOUS at 09:27

## 2022-11-19 RX ADMIN — PIPERACILLIN AND TAZOBACTAM 3375 MG: 3; .375 INJECTION, POWDER, FOR SOLUTION INTRAVENOUS at 15:54

## 2022-11-19 RX ADMIN — FENTANYL CITRATE 25 MCG: 50 INJECTION, SOLUTION INTRAMUSCULAR; INTRAVENOUS at 08:56

## 2022-11-19 RX ADMIN — LIDOCAINE HYDROCHLORIDE 60 MG: 20 INJECTION, SOLUTION EPIDURAL; INFILTRATION; INTRACAUDAL; PERINEURAL at 08:26

## 2022-11-19 RX ADMIN — HYDROMORPHONE HYDROCHLORIDE 0.25 MG: 2 INJECTION, SOLUTION INTRAMUSCULAR; INTRAVENOUS; SUBCUTANEOUS at 10:10

## 2022-11-19 RX ADMIN — HYDROMORPHONE HYDROCHLORIDE 0.25 MG: 2 INJECTION, SOLUTION INTRAMUSCULAR; INTRAVENOUS; SUBCUTANEOUS at 10:15

## 2022-11-19 RX ADMIN — HYDROMORPHONE HYDROCHLORIDE 0.25 MG: 2 INJECTION, SOLUTION INTRAMUSCULAR; INTRAVENOUS; SUBCUTANEOUS at 10:25

## 2022-11-19 RX ADMIN — Medication 100 MG: at 08:26

## 2022-11-19 RX ADMIN — HYDROMORPHONE HYDROCHLORIDE 0.25 MG: 2 INJECTION, SOLUTION INTRAMUSCULAR; INTRAVENOUS; SUBCUTANEOUS at 10:45

## 2022-11-19 RX ADMIN — HYDROMORPHONE HYDROCHLORIDE 0.25 MG: 2 INJECTION, SOLUTION INTRAMUSCULAR; INTRAVENOUS; SUBCUTANEOUS at 10:35

## 2022-11-19 RX ADMIN — MORPHINE SULFATE 2 MG: 2 INJECTION, SOLUTION INTRAMUSCULAR; INTRAVENOUS at 12:24

## 2022-11-19 RX ADMIN — PHENOL 1 SPRAY: 1.5 LIQUID ORAL at 19:56

## 2022-11-19 RX ADMIN — FENTANYL CITRATE 50 MCG: 50 INJECTION, SOLUTION INTRAMUSCULAR; INTRAVENOUS at 08:23

## 2022-11-19 RX ADMIN — PIPERACILLIN AND TAZOBACTAM 3375 MG: 3; .375 INJECTION, POWDER, FOR SOLUTION INTRAVENOUS at 06:45

## 2022-11-19 RX ADMIN — MORPHINE SULFATE 2 MG: 2 INJECTION, SOLUTION INTRAMUSCULAR; INTRAVENOUS at 18:04

## 2022-11-19 RX ADMIN — MORPHINE SULFATE 2 MG: 2 INJECTION, SOLUTION INTRAMUSCULAR; INTRAVENOUS at 15:52

## 2022-11-19 ASSESSMENT — PAIN DESCRIPTION - LOCATION
LOCATION: ABDOMEN

## 2022-11-19 ASSESSMENT — PAIN SCALES - GENERAL
PAINLEVEL_OUTOF10: 4
PAINLEVEL_OUTOF10: 8
PAINLEVEL_OUTOF10: 7
PAINLEVEL_OUTOF10: 0
PAINLEVEL_OUTOF10: 9
PAINLEVEL_OUTOF10: 8
PAINLEVEL_OUTOF10: 7
PAINLEVEL_OUTOF10: 7
PAINLEVEL_OUTOF10: 0
PAINLEVEL_OUTOF10: 7
PAINLEVEL_OUTOF10: 10
PAINLEVEL_OUTOF10: 9
PAINLEVEL_OUTOF10: 10
PAINLEVEL_OUTOF10: 0

## 2022-11-19 ASSESSMENT — PAIN DESCRIPTION - PAIN TYPE
TYPE: SURGICAL PAIN
TYPE: ACUTE PAIN

## 2022-11-19 ASSESSMENT — PAIN DESCRIPTION - ORIENTATION
ORIENTATION: LEFT
ORIENTATION: MID
ORIENTATION: MID

## 2022-11-19 ASSESSMENT — PAIN DESCRIPTION - DESCRIPTORS
DESCRIPTORS: ACHING
DESCRIPTORS: ACHING

## 2022-11-19 ASSESSMENT — ENCOUNTER SYMPTOMS: SHORTNESS OF BREATH: 0

## 2022-11-19 NOTE — PROGRESS NOTES
Pt in bed alert vitals signs and assessments done,vitals signs within normal limits,denies any pain. currently resting with call light within reach and bed alarm on.

## 2022-11-19 NOTE — BRIEF OP NOTE
[address] and Laparoscopic Surgery  Brief Operative Note  Pt Name: Abhilash Abo  CSN: [id number]  [place]: 1940    Date of Procedure: 11/19/2022    Pre-operative Diagnosis:  Small bowel obstruction    Post-operative Diagnosis: same     Procedure:  Exploratory laparotomy, lysis of adhesions, small bowel and cecum resection with anastomosis    Surgeon(s):  David Amaya MD     Surgical Assistant: Sherita Pacheco    Anesthesia:  General anesthesia    Findings: Full note dictated, Dictation Job Number: [id number]    Estimated Blood Loss: less than 50  ml    Complications: None    Specimens:  small bowel and cecum  ID Type Source Tests Collected by Time Destination   A : SMALL BOWEL AND CECUM  Tissue Colon SURGICAL PATHOLOGY David Amaya MD 11/19/2022 0901       Implants:  * No implants in log *    Drains: JUNIOR in pelvis   Closed/Suction Drain Superior Abdomen Bulb (Active)       NG/OG/NJ/NE Tube Nasogastric 16 fr Right nostril (Active)   Surrounding Skin Clean, dry & intact 11/19/22 0747   Securement device Bridle 11/19/22 0747   Status Clamped 11/19/22 0747   Placement Verified X-Ray (repeat) 11/19/22 0702   NG/OG/NJ/NE External Measurement (cm) 60 cm 11/19/22 0702   Drainage Appearance Bile;Brown 11/19/22 0747   Output (mL) 10 ml 11/19/22 0747   Action Taken Other (comment) 11/19/22 0747       Urinary Catheter 11/19/22 2 Way (Active)       [REMOVED] NG/OG/NJ/NE Tube Nasogastric Right nostril (Removed)   Surrounding Skin Clean, dry & intact 11/16/22 0014   Securement device Adhesive based dumont 11/16/22 0014   Status Suction-low intermittent 11/16/22 0014   Placement Verified X-Ray (Initial); Respiratory Status;Gastric Contents 11/15/22 1036   NG/OG/NJ/NE External Measurement (cm) 65 cm 11/16/22 0014   Drainage Appearance Bile;Brown;Green 11/15/22 1036   Output (mL) 0 ml 11/15/22 1628       [REMOVED] NG/OG/NJ/NE Tube Nasogastric 16 fr Right nostril (Removed)       [REMOVED] External Urinary Catheter (Removed)   Site Assessment Clean,dry & intact 11/15/22 0640   Placement Initiated 11/15/22 0640       Condition: stable     Disposition and Post-operative plan: PACU, med/surg matthew     Ki Delgado MD, FACS  11/19/2022  9:35 AM

## 2022-11-19 NOTE — ANESTHESIA POSTPROCEDURE EVALUATION
Department of Anesthesiology  Postprocedure Note    Patient: Iqra Saleh  MRN: [id number]  YOB: 1940  Date of evaluation: 11/19/2022      Procedure Summary     Date: 11/19/22 Room / Location: [address]    Anesthesia Start: 0815 Anesthesia Stop: 5375    Procedure: EXPLORATORY LAPAROTOMY, LYSIS OF ADHESIONS,  BOWEL RESECTION (Abdomen) Diagnosis:       Intestinal obstruction, unspecified cause, unspecified whether partial or complete (HCC)      (Intestinal obstruction, unspecified cause, unspecified whether partial or complete ([facility] [K56.609])    Surgeons: Socorro Barnes MD Responsible Provider: Sha Porter MD    Anesthesia Type: general ASA Status: 3          Anesthesia Type: No value filed.     Petra Phase I:      Petra Phase II:        Anesthesia Post Evaluation    Patient location during evaluation: PACU  Patient participation: complete - patient participated  Level of consciousness: awake and alert  Airway patency: patent  Nausea & Vomiting: no nausea and no vomiting  Complications: no  Cardiovascular status: hemodynamically stable  Respiratory status: acceptable  Hydration status: stable  Multimodal analgesia pain management approach

## 2022-11-19 NOTE — OP NOTE
[facility]                     [facility], [address] [place] [address]                                OPERATIVE REPORT    PATIENT NAME: Conner Miller                      :        1940  MED REC NO:   [id number]                          ROOM:       5205  ACCOUNT NO:   [de-identified]                           ADMIT DATE: 2022  PROVIDER:     Jailyn Leslie MD    DATE OF PROCEDURE:  2022    PREOPERATIVE DIAGNOSIS:  Small bowel obstruction. POSTOPERATIVE DIAGNOSIS:  Small bowel obstruction. OPERATION PERFORMED:  Exploratory laparotomy, lysis of adhesions, small  bowel and cecum resection with anastomosis. SURGEON:  Jailyn Leslie MD    ANESTHESIA:  General.    INDICATIONS:  This is an 79-year-old male who presents to [facility] with a small bowel obstruction. Initially, it was thought to  possibly be an ileus or even gastroparesis; however, with failure of  medical management and imaging, more concerning for obstruction as well  as lack of progress. The risks, benefits, and alternative treatments of  exploratory laparotomy with lysis of adhesions and possible bowel  resection were discussed with the patient and her family. Details of  the procedure were discussed. All questions were answered. They  understood, agreed, and wished to proceed. DETAILS OF THE PROCEDURE:  The patient was brought to the operating  suite and laid in the supine position. General anesthesia was induced  and well tolerated. The patient's abdomen was prepped and draped in the  usual sterile fashion. After proper time-out was performed, verifying  the operative site, the procedure and the patient, a generous low  midline incision was made with a scalpel. This was deepened through the  subcutaneous tissue through the fascia into the peritoneum with a  cautery.   On median exploration, there was necrotic bowel noted in the  pelvis and cloudy red ascites that would be suctioned. With mostly  blunt dissection but careful sharp dissection and cautery, the small  bowel was able to be delivered into the central abdomen. The pelvis was  inspected and appeared to be an inflammatory rind but the rectum and  surrounding tissue appeared to be otherwise viable and noticed no  resection was necessary in the pelvis. There was, however, a large  amount of a small bowel that would need to be resected. The patient had  270 cm of proximal bowel that was viable. The remainder of the small  bowel all the way to the cecum appeared to be involved and with many  area of full-thickness necrosis and gangrene but had not yet perforated. Due to the proximity of the small bowel to the cecum, there was not  enough small bowel to perform just the small bowel resection and the  cecum had be resected as well. The lateral attachments along the white  line of Toldt Of the cecum and the ascending colon were cut with a  cautery and the ascending colon was mobilized medially. The mid  ascending colon and the area that appeared to be viable and with good  blood supply was transected with the blue load of the Aspen stapler. The proximal small bowel was then divided with another blue load of the  Aspen stapler and the mesentery divided with the LigaSure device. The  specimen was then passed off to Pathology for further analysis. The  wound and the anterior peritoneal cavity were copiously irrigated with  many liters of warm normal saline until the area was clear. A #19 Jhonatan  drain will be left with the left abdomen and into the pelvis under  direct vision to minimize the risk of abscess. The two ends of the  bowel were lined with multiple interrupted 3-0 Vicryl sutures. The  corners of the staple ends were cut and common anastomosis was then  created within the blue load of the Aspen stapler.   The anastomosis  was widely patent and hemostatic and the common enterotomy was healed  with the blue load of the TX 60 staggering the staple lines to optimize  blood flow to the edges. This appeared to be intact without time to  complication and the bowel was returned to the abdomen taking care not  to twist the mesentery. The abdomen again was then copiously irrigated  and hemostasis was verified and the fascia was closed with two looped 0  PDS sutures. The wound was irrigated and the skin was closed loosely  with staples. The wound was then cleaned and dressed with a JOANA  dressing. The patient tolerated the procedure well and was transferred  to the PACU in stable condition. SPECIMENS:  Small bowel and cecum resection. DRAINS:  None. COMPLICATIONS:  None. ESTIMATED BLOOD LOSS:  Less than 50 mL.         Juan Costello MD    D: 11/19/2022 9:46:51       T: 11/19/2022 13:00:13     [id number]  Job#: [id number]     Doc#: [id number]    CC:

## 2022-11-19 NOTE — PROGRESS NOTES
Vitals taken and stable. IV and NG disconnected and transported to surgery at this time. Returned to room from procedure. VSS. NG connected and draining brown drainage. JOANA dressing in place to abdomen.

## 2022-11-19 NOTE — PROGRESS NOTES
Pt arrived from OR to PACU, arouses to stimuli. VSS, O2 sats 97% on 6 L simple mask. Dressing to abdomen dry and intact, JOANA dressing in place, no drainage noted. JUNIOR drain in place, serosanguinous output. Carrillo in place draining clear az urine, NG in place to [facility]. Will monitor and treat pain per MAR.

## 2022-11-19 NOTE — PROGRESS NOTES
Hospitalist Progress Note      PCP: Zita Hong MD    Date of Admission: 11/14/2022    Chief Complaint: Abd pain and nausea    Hospital Course: admitted with partial SBO. General surgery consulted. Conservative management with NG tube failed. Subjective: Patient having post op pain. Just returned from exploratory laparotomy with small bowel and ileal resection. No cough or SOB. Medications:  Reviewed    Infusion Medications    sodium chloride      sodium chloride 125 mL/hr at 11/19/22 1309    dextrose 5% and 0.45% NaCl with KCl 20 mEq 125 mL/hr at 11/19/22 1122     Scheduled Medications    sodium chloride flush  5-40 mL IntraVENous 2 times per day    enoxaparin  40 mg SubCUTAneous Daily    pantoprazole  40 mg IntraVENous Daily    piperacillin-tazobactam  3,375 mg IntraVENous Q8H    sodium chloride flush  5-40 mL IntraVENous 2 times per day     PRN Meds: sodium chloride flush, sodium chloride, ondansetron **OR** ondansetron, phenol, morphine **OR** morphine, oxyCODONE **OR** oxyCODONE, acetaminophen, diazePAM, sodium chloride flush, [DISCONTINUED] acetaminophen **OR** acetaminophen, [DISCONTINUED] morphine **OR** morphine      Intake/Output Summary (Last 24 hours) at 11/19/2022 1518  Last data filed at 11/19/2022 1047  Gross per 24 hour   Intake 900 ml   Output 1115 ml   Net -215 ml         Physical Exam Performed:    BP (!) 139/59   Pulse 89   Temp 97.2 °F (36.2 °C) (Temporal)   Resp 16   Ht 5' 3\" (1.6 m)   Wt 118 lb 8 oz (53.8 kg)   SpO2 98%   BMI 20.99 kg/m²     General appearance: No apparent distress, appears stated age and cooperative. fatigued overall. NG in place. HEENT: Pupils equal, round, and reactive to light. Conjunctivae/corneas clear. Neck: Supple, with full range of motion. No jugular venous distention. Trachea midline. Respiratory:  Normal respiratory effort. Clear to auscultation, bilaterally without Rales/Wheezes/Rhonchi.   Cardiovascular: Regular rate and rhythm with normal S1/S2 without murmurs, rubs or gallops. Abdomen: Soft,   moderate diffuse abd tenderness to palpation. Non-distended with normal bowel sounds. Musculoskeletal: No clubbing, cyanosis or edema bilaterally. Full range of motion without deformity. Skin: Skin color, texture, turgor normal.  No rashes or lesions. Neurologic:  Neurovascularly intact without any focal sensory/motor deficits. Cranial nerves: II-XII intact, grossly non-focal.  Psychiatric: Alert and oriented, thought content appropriate, normal insight  Capillary Refill: Brisk, 3 seconds, normal   Peripheral Pulses: +2 palpable, equal bilaterally       Labs:   Recent Labs     11/17/22  0823 11/18/22  0744 11/19/22  0612   WBC 19.0* 9.4 9.3   HGB 13.1 12.3 11.9*   HCT 39.7 36.7 35.7*    244 245       Recent Labs     11/17/22  0823 11/18/22  0744 11/19/22  0612    140 143   K 4.2 4.0 3.6    106 107   CO2 23 26 28   BUN 21* 24* 19   CREATININE 0.5* 0.7 0.6   CALCIUM 9.1 8.6 8.5   PHOS 1.8* 1.9*  --        No results for input(s): AST, ALT, BILIDIR, BILITOT, ALKPHOS in the last 72 hours. No results for input(s): INR in the last 72 hours. No results for input(s): Shala Earnest in the last 72 hours. Urinalysis:      Lab Results   Component Value Date/Time    NITRU Negative 11/17/2022 07:15 PM    WBCUA 14 11/17/2022 07:15 PM    BACTERIA 2+ 11/17/2022 07:15 PM    RBCUA 8 11/17/2022 07:15 PM    BLOODU MODERATE 11/17/2022 07:15 PM    SPECGRAV >=1.030 11/17/2022 07:15 PM    GLUCOSEU Negative 11/17/2022 07:15 PM       Radiology:  XR ABDOMEN (2 VIEWS)   Final Result   Mechanical obstruction at the distal 3rd of the small bowel. Concomitant   small bowel ileus. RECOMMENDATION:   An additional delay radiograph of lower abdomen and pelvis at 24 hours from   this exam 2 determine the exact level in the distal 3rd of the small bowel   where there is an obstruction.          XR ABDOMEN (KUB) (SINGLE AP VIEW)   Final Result Distal small-bowel obstruction pattern. It would be helpful to position the patient right lateral decubitus, in order   to encourage gastric emptying. FL SMALL BOWEL FOLLOW THROUGH ONLY   Final Result   Small-bowel follow-through study showing significant retention of contrast in   the lumen of the stomach and only a small amount of transit into the small   bowel that is very dilute. Pattern may represent a partial small bowel   obstruction. Gastroparesis or ileus may also be considered. XR ABDOMEN FOR NG/OG/NE TUBE PLACEMENT   Final Result   Looped configuration of nasogastric tube, coiled at the stomach and   redirected to the midesophagus level. Withdrawal of 20 cm is suggested,   followed by readvancement. XR ABDOMEN FOR NG/OG/NE TUBE PLACEMENT   Final Result   Nasogastric tube projects in normal position. CT ABDOMEN PELVIS W IV CONTRAST Additional Contrast? None   Final Result   1. Loops of mildly dilated fluid-filled small bowel in the pelvis which   appear to represent ileum. No clear transition point is seen. Air is still   seen distally in the colon and the proximal duodenal and jejunal loops do not   appear dilated. This likely represents a partial small bowel obstruction. 2.  Colonic diverticulosis without evidence of diverticulitis. XR CHEST PORTABLE   Final Result   No acute cardiopulmonary disease             Assessment/Plan:    Active Hospital Problems    Diagnosis     SBO (small bowel obstruction) ([facility] [K56.609]      Priority: Medium     Partial small bowel obstruction: PO day 0 from exploratory laparotomy with ROBERTO, small bowel and cecum resection. Has NG, continue fluids. Discussed with Dr Frances Delgado. Will keep on zosyn as she had bowel ischemia.           DVT Prophylaxis: Lovenox  Diet: Diet NPO Exceptions are: Sips of Water with Meds, Ice Chips, Sips of Clear Liquids  Code Status: Full Code  PT/OT Eval Status:             Mackenzie Cheng PA-C

## 2022-11-19 NOTE — PLAN OF CARE
Problem: ABCDS Injury Assessment  Goal: Absence of physical injury  Outcome: Progressing     Problem: Pain  Goal: Verbalizes/displays adequate comfort level or baseline comfort level  Outcome: Progressing     Problem: Skin/Tissue Integrity  Goal: Absence of new skin breakdown  Description: 1. Monitor for areas of redness and/or skin breakdown  2. Assess vascular access sites hourly  3. Every 4-6 hours minimum:  Change oxygen saturation probe site  4. Every 4-6 hours:  If on nasal continuous positive airway pressure, respiratory therapy assess nares and determine need for appliance change or resting period.   Outcome: Progressing     Problem: Safety - Adult  Goal: Free from fall injury  Outcome: Progressing     Problem: Discharge Planning  Goal: Discharge to home or other facility with appropriate resources  Outcome: Progressing

## 2022-11-19 NOTE — PROGRESS NOTES
Pt resting quietly in bed with eyes closed, awakens to voice. States pain is improved in abdomen, falls asleep easily. VSS, O2 sats 97% on 3 L NC. Dressing to abdomen dry and intact, abdomen soft. JUNIOR drain remains in place, 60 mL serosanguinous output emptied. Carrillo remains in place, 200 mL clear az urine emptied. NG to intermittent low wall suction per order, about 100 mL green/brown output. Pt seen by anesthesia, phase 1 criteria met. Will transfer pt to 5T.

## 2022-11-19 NOTE — ANESTHESIA PRE PROCEDURE
Department of Anesthesiology  Preprocedure Note       Name:  Laurita Guan   Age:  79 y.o.  :  1940                                          MRN:  [id number]         Date:  2022      Surgeon: Lluvia Moreira):  Arabella Gonzalez MD    Procedure: Procedure(s):  EXPLORATORY LAPAROTOMY, LYSIS OF ADHESIONS, POSSIBLE BOWEL RESECTION    Medications prior to admission:   Prior to Admission medications    Medication Sig Start Date End Date Taking? Authorizing Provider   alendronate (FOSAMAX) 10 MG tablet  21   Historical Provider, MD   Cholecalciferol (VITAMIN D3 PO) Take 1,000 Units by mouth daily    Historical Provider, MD   CALCIUM CITRATE-VITAMIN D PO Take 4 capsules by mouth daily 800 mg a day    Historical Provider, MD   estradiol (ESTRACE) 0.1 MG/GM vaginal cream Place 2 g vaginally Twice a Week    Historical Provider, MD       Current medications:    No current facility-administered medications for this visit. No current outpatient medications on file.      Facility-Administered Medications Ordered in Other Visits   Medication Dose Route Frequency Provider Last Rate Last Admin    piperacillin-tazobactam (ZOSYN) 3,375 mg in dextrose 5 % 50 mL IVPB extended infusion (mini-bag)  3,375 mg IntraVENous Q8H Arabella Gonzalez MD 12.5 mL/hr at 22 0645 3,375 mg at 22 0645    diazePAM (VALIUM) injection 2.5 mg  2.5 mg IntraVENous Q4H PRN Isma Clemagda, PA-C   2.5 mg at 22 0656    sodium chloride flush 0.9 % injection 5-40 mL  5-40 mL IntraVENous 2 times per day Isma Clemagda, PA-C   10 mL at 22    sodium chloride flush 0.9 % injection 5-40 mL  5-40 mL IntraVENous PRN Isma Clement, PA-C   10 mL at 11/15/22 0645    0.9 % sodium chloride infusion   IntraVENous PRN Isma Clement, PA-C        ondansetron [facility]) injection 4 mg  4 mg IntraVENous Q6H PRN Isma Clement, PA-C        acetaminophen (TYLENOL) tablet 650 mg  650 mg Oral Q6H PRN Isma Clemagda, PA-C Or    acetaminophen (TYLENOL) suppository 650 mg  650 mg Rectal Q6H PRN Nannie Loser, PA-C        enoxaparin (LOVENOX) injection 40 mg  40 mg SubCUTAneous Daily Nannie Loser, PA-C   40 mg at 11/18/22 0801    morphine (PF) injection 2 mg  2 mg IntraVENous Q3H PRN Nannie Loser, PA-C   2 mg at 11/16/22 2134    Or    morphine injection 4 mg  4 mg IntraVENous Q3H PRN Nannie Loser, PA-C   4 mg at 11/18/22 0803    dextrose 5 % and 0.45 % NaCl with KCl 20 mEq infusion   IntraVENous Continuous Tiburcio Haynes  mL/hr at 11/18/22 2044 New Bag at 11/18/22 2044       Allergies:  No Known Allergies    Problem List:    Patient Active Problem List   Diagnosis Code    Age-related osteoporosis without current pathological fracture M81.0    White coat syndrome without hypertension R03.0    SBO (small bowel obstruction) ([facility] K56.609       Past Medical History:        Diagnosis Date    Age-related osteoporosis without current pathological fracture 11/05/2018    Arthritis     Osteoporosis        Past Surgical History:        Procedure Laterality Date    COLONOSCOPY      EYE SURGERY Right     cataract    HYSTERECTOMY (CERVIX STATUS UNKNOWN)  1984    OTHER SURGICAL HISTORY  2006    melanoma removed     SKIN BIOPSY     Willia Marie Right     Dr. Elio Quiroz       Social History:    Social History     Tobacco Use    Smoking status: Never    Smokeless tobacco: Never   Substance Use Topics    Alcohol use: No                                Counseling given: Not Answered      Vital Signs (Current): There were no vitals filed for this visit.                                            BP Readings from Last 3 Encounters:   11/19/22 137/70   07/29/22 115/70   06/08/22 (!) 150/72       NPO Status:                                                                                 BMI:   Wt Readings from Last 3 Encounters:   11/19/22 118 lb 8 oz (53.8 kg)   07/29/22 119 lb 3.2 oz (54.1 kg)   06/08/22 116 lb 9.6 oz (52.9 kg)     There is no height or weight on file to calculate BMI.    CBC:   Lab Results   Component Value Date/Time    WBC 9.3 11/19/2022 06:12 AM    RBC 3.78 11/19/2022 06:12 AM    HGB 11.9 11/19/2022 06:12 AM    HCT 35.7 11/19/2022 06:12 AM    MCV 94.4 11/19/2022 06:12 AM    RDW 13.6 11/19/2022 06:12 AM     11/19/2022 06:12 AM       CMP:   Lab Results   Component Value Date/Time     11/19/2022 06:12 AM    K 3.6 11/19/2022 06:12 AM     11/19/2022 06:12 AM    CO2 28 11/19/2022 06:12 AM    BUN 19 11/19/2022 06:12 AM    CREATININE 0.6 11/19/2022 06:12 AM    GFRAA >60 02/04/2021 07:37 AM    GFRAA >60 09/20/2012 08:30 AM    AGRATIO 1.6 11/15/2022 04:26 AM    LABGLOM >60 11/19/2022 06:12 AM    GLUCOSE 135 11/19/2022 06:12 AM    PROT 6.2 11/15/2022 04:26 AM    PROT 7.5 09/20/2012 08:30 AM    CALCIUM 8.5 11/19/2022 06:12 AM    BILITOT 0.4 11/15/2022 04:26 AM    ALKPHOS 56 11/15/2022 04:26 AM    AST 22 11/15/2022 04:26 AM    ALT 12 11/15/2022 04:26 AM       POC Tests: No results for input(s): POCGLU, POCNA, POCK, POCCL, POCBUN, POCHEMO, POCHCT in the last 72 hours.     Coags:   Lab Results   Component Value Date/Time    PROTIME 13.5 11/15/2022 04:26 AM    INR 1.04 11/15/2022 04:26 AM    APTT 26.9 11/15/2022 04:26 AM       HCG (If Applicable): No results found for: PREGTESTUR, PREGSERUM, HCG, HCGQUANT     ABGs: No results found for: PHART, PO2ART, TVU9SFU, CZW6LPY, BEART, F8MRZTRZ     Type & Screen (If Applicable):  No results found for: LABABO, LABRH    Drug/Infectious Status (If Applicable):  No results found for: HIV, HEPCAB    COVID-19 Screening (If Applicable): No results found for: COVID19        Anesthesia Evaluation   no history of anesthetic complications:   Airway: Mallampati: II  TM distance: >3 FB   Neck ROM: full  Mouth opening: > = 3 FB   Dental: normal exam         Pulmonary:       (-) COPD, asthma and shortness of breath                           Cardiovascular:    (+) hypertension: moderate,     (-)  CHF    ECG reviewed  Rhythm: regular  Rate: normal  Echocardiogram reviewed               ROS comment: ECHO 3/13/2020   Summary   -Normal left ventricle size, wall thickness, and systolic function with an   estimated ejection fraction of 55-60%. Grade I diastolic dysfunction with   normal LV filling pressures. -Mild mitral regurgitation.   -Mild aortic regurgitation.   -Mild tricuspid regurgitation with PASP of 29 mmHg. Neuro/Psych:      (-) seizures and headaches           GI/Hepatic/Renal:             Endo/Other:    (+) : arthritis: OA., .    (-) diabetes mellitus               Abdominal:             Vascular:     - PVD and DVT. Other Findings:             Anesthesia Plan      general     ASA 3       Induction: intravenous. MIPS: Postoperative opioids intended and Prophylactic antiemetics administered. Anesthetic plan and risks discussed with patient. Use of blood products discussed with patient whom.                  Claire Inman MD   11/19/2022

## 2022-11-20 LAB
A/G RATIO: 0.6 (ref 1.1–2.2)
ALBUMIN SERPL-MCNC: 2 G/DL (ref 3.4–5)
ALP BLD-CCNC: 74 U/L (ref 40–129)
ALT SERPL-CCNC: 12 U/L (ref 10–40)
ANION GAP SERPL CALCULATED.3IONS-SCNC: 7 MMOL/L (ref 3–16)
APTT: 32.4 SEC (ref 23–34.3)
AST SERPL-CCNC: 20 U/L (ref 15–37)
BASOPHILS ABSOLUTE: 0 K/UL (ref 0–0.2)
BASOPHILS RELATIVE PERCENT: 0.1 %
BILIRUB SERPL-MCNC: 0.6 MG/DL (ref 0–1)
BUN BLDV-MCNC: 20 MG/DL (ref 7–20)
CALCIUM SERPL-MCNC: 7.8 MG/DL (ref 8.3–10.6)
CHLORIDE BLD-SCNC: 113 MMOL/L (ref 99–110)
CO2: 26 MMOL/L (ref 21–32)
CREAT SERPL-MCNC: 0.6 MG/DL (ref 0.6–1.2)
EOSINOPHILS ABSOLUTE: 0 K/UL (ref 0–0.6)
EOSINOPHILS RELATIVE PERCENT: 0 %
GFR SERPL CREATININE-BSD FRML MDRD: >60 ML/MIN/{1.73_M2}
GLUCOSE BLD-MCNC: 125 MG/DL (ref 70–99)
HCT VFR BLD CALC: 30.8 % (ref 36–48)
HEMOGLOBIN: 10.3 G/DL (ref 12–16)
INR BLD: 1.06 (ref 0.87–1.14)
LACTIC ACID: 0.9 MMOL/L (ref 0.4–2)
LYMPHOCYTES ABSOLUTE: 0.5 K/UL (ref 1–5.1)
LYMPHOCYTES RELATIVE PERCENT: 5 %
MAGNESIUM: 2.2 MG/DL (ref 1.8–2.4)
MCH RBC QN AUTO: 31.5 PG (ref 26–34)
MCHC RBC AUTO-ENTMCNC: 33.5 G/DL (ref 31–36)
MCV RBC AUTO: 93.8 FL (ref 80–100)
MONOCYTES ABSOLUTE: 0.9 K/UL (ref 0–1.3)
MONOCYTES RELATIVE PERCENT: 8.4 %
NEUTROPHILS ABSOLUTE: 9.2 K/UL (ref 1.7–7.7)
NEUTROPHILS RELATIVE PERCENT: 86.5 %
PDW BLD-RTO: 13.8 % (ref 12.4–15.4)
PHOSPHORUS: 2.7 MG/DL (ref 2.5–4.9)
PLATELET # BLD: 229 K/UL (ref 135–450)
PMV BLD AUTO: 8.1 FL (ref 5–10.5)
POTASSIUM SERPL-SCNC: 4.1 MMOL/L (ref 3.5–5.1)
PROTHROMBIN TIME: 13.8 SEC (ref 11.7–14.5)
RBC # BLD: 3.28 M/UL (ref 4–5.2)
SODIUM BLD-SCNC: 146 MMOL/L (ref 136–145)
TOTAL PROTEIN: 5.2 G/DL (ref 6.4–8.2)
WBC # BLD: 10.7 K/UL (ref 4–11)

## 2022-11-20 PROCEDURE — 6360000002 HC RX W HCPCS: Performed by: SURGERY

## 2022-11-20 PROCEDURE — 1200000000 HC SEMI PRIVATE

## 2022-11-20 PROCEDURE — 84100 ASSAY OF PHOSPHORUS: CPT

## 2022-11-20 PROCEDURE — APPSS15 APP SPLIT SHARED TIME 0-15 MINUTES: Performed by: NURSE PRACTITIONER

## 2022-11-20 PROCEDURE — 2580000003 HC RX 258: Performed by: SURGERY

## 2022-11-20 PROCEDURE — 2580000003 HC RX 258: Performed by: PHYSICIAN ASSISTANT

## 2022-11-20 PROCEDURE — 85025 COMPLETE CBC W/AUTO DIFF WBC: CPT

## 2022-11-20 PROCEDURE — 83735 ASSAY OF MAGNESIUM: CPT

## 2022-11-20 PROCEDURE — 36415 COLL VENOUS BLD VENIPUNCTURE: CPT

## 2022-11-20 PROCEDURE — 99024 POSTOP FOLLOW-UP VISIT: CPT | Performed by: SURGERY

## 2022-11-20 PROCEDURE — 85730 THROMBOPLASTIN TIME PARTIAL: CPT

## 2022-11-20 PROCEDURE — 83605 ASSAY OF LACTIC ACID: CPT

## 2022-11-20 PROCEDURE — 80053 COMPREHEN METABOLIC PANEL: CPT

## 2022-11-20 PROCEDURE — 85610 PROTHROMBIN TIME: CPT

## 2022-11-20 PROCEDURE — APPNB30 APP NON BILLABLE TIME 0-30 MINS: Performed by: NURSE PRACTITIONER

## 2022-11-20 PROCEDURE — C9113 INJ PANTOPRAZOLE SODIUM, VIA: HCPCS | Performed by: SURGERY

## 2022-11-20 RX ORDER — SODIUM CHLORIDE 9 MG/ML
INJECTION, SOLUTION INTRAVENOUS
Status: DISPENSED
Start: 2022-11-20 | End: 2022-11-20

## 2022-11-20 RX ORDER — DEXTROSE AND SODIUM CHLORIDE 5; .45 G/100ML; G/100ML
INJECTION, SOLUTION INTRAVENOUS CONTINUOUS
Status: DISCONTINUED | OUTPATIENT
Start: 2022-11-20 | End: 2022-11-21

## 2022-11-20 RX ADMIN — ENOXAPARIN SODIUM 40 MG: 100 INJECTION SUBCUTANEOUS at 07:39

## 2022-11-20 RX ADMIN — MORPHINE SULFATE 4 MG: 4 INJECTION, SOLUTION INTRAMUSCULAR; INTRAVENOUS at 13:38

## 2022-11-20 RX ADMIN — Medication 10 ML: at 08:04

## 2022-11-20 RX ADMIN — MORPHINE SULFATE 2 MG: 2 INJECTION, SOLUTION INTRAMUSCULAR; INTRAVENOUS at 07:42

## 2022-11-20 RX ADMIN — PIPERACILLIN AND TAZOBACTAM 3375 MG: 3; .375 INJECTION, POWDER, FOR SOLUTION INTRAVENOUS at 11:28

## 2022-11-20 RX ADMIN — MORPHINE SULFATE 2 MG: 2 INJECTION, SOLUTION INTRAMUSCULAR; INTRAVENOUS at 05:33

## 2022-11-20 RX ADMIN — Medication 10 ML: at 08:05

## 2022-11-20 RX ADMIN — PIPERACILLIN AND TAZOBACTAM 3375 MG: 3; .375 INJECTION, POWDER, FOR SOLUTION INTRAVENOUS at 18:41

## 2022-11-20 RX ADMIN — PANTOPRAZOLE SODIUM 40 MG: 40 INJECTION, POWDER, FOR SOLUTION INTRAVENOUS at 07:39

## 2022-11-20 RX ADMIN — DEXTROSE AND SODIUM CHLORIDE: 5; 450 INJECTION, SOLUTION INTRAVENOUS at 14:37

## 2022-11-20 ASSESSMENT — PAIN DESCRIPTION - ORIENTATION
ORIENTATION: MID
ORIENTATION: LEFT

## 2022-11-20 ASSESSMENT — PAIN DESCRIPTION - LOCATION
LOCATION: ABDOMEN

## 2022-11-20 ASSESSMENT — PAIN DESCRIPTION - ONSET: ONSET: ON-GOING

## 2022-11-20 ASSESSMENT — PAIN DESCRIPTION - DESCRIPTORS
DESCRIPTORS: ACHING
DESCRIPTORS: ACHING

## 2022-11-20 ASSESSMENT — PAIN SCALES - GENERAL
PAINLEVEL_OUTOF10: 0
PAINLEVEL_OUTOF10: 6
PAINLEVEL_OUTOF10: 6
PAINLEVEL_OUTOF10: 0
PAINLEVEL_OUTOF10: 7
PAINLEVEL_OUTOF10: 2
PAINLEVEL_OUTOF10: 0

## 2022-11-20 ASSESSMENT — PAIN DESCRIPTION - FREQUENCY: FREQUENCY: INTERMITTENT

## 2022-11-20 ASSESSMENT — PAIN DESCRIPTION - PAIN TYPE
TYPE: SURGICAL PAIN
TYPE: SURGICAL PAIN

## 2022-11-20 NOTE — PROGRESS NOTES
Patient resting in bed, abdominal JOANA dressing CDI, vitals stable, denies pain at this time, JUNIOR compressed with small amount of drainage in bulb.  Patient complains of throat soreness and irritation, also states her lips are dry, lip moisturizer and cepacol spray provided, patient's neighbor at bedside to help reorient patient and provide emotional support, assisted to position of comfort, patient encouraged to call with needs, call light in reach, items within reach, will continue to monitor

## 2022-11-20 NOTE — PROGRESS NOTES
Incentive Spirometry education and demonstration completed by Respiratory Therapy Yes      Response to education: Fair     Teaching Time: 5 minutes    Minimum Predicted Vital Capacity - 524 mL. Patient's Actual Vital Capacity - 400 mL.  Turning over to Nursing for routine follow-up No.    Comments:     Electronically signed by Jonatan Mortensen RCP on 11/20/2022 at 4:04 PM

## 2022-11-20 NOTE — PROGRESS NOTES
[facility]  Progress Note    Pt Name: Hillis Mortimer  MRN: [id number]  [place]: 1940  Date of Evaluation: 2022    Subjective:    No acute events overnight  Pain controlled  No nausea/vomiting, NGT in place  No flatus or stool  Resting in bed at this time    Postoperative Day #1       Vital Signs:  Patient Vitals for the past 24 hrs:   BP Temp Temp src Pulse Resp SpO2 Weight   22 0742 -- -- -- -- 16 -- --   22 0715 (!) 145/77 98.3 °F (36.8 °C) Axillary 81 -- 94 % --   22 0533 -- -- -- -- -- -- 117 lb 9.6 oz (53.3 kg)   22 0345 131/72 98.1 °F (36.7 °C) Oral 85 18 98 % --   22 0020 137/74 97.8 °F (36.6 °C) Oral 92 18 95 % --   22 1940 132/79 97.5 °F (36.4 °C) Oral 89 18 97 % --   22 1804 -- -- -- -- 16 91 % --   22 1552 -- -- -- -- 16 -- --   22 1544 138/82 97.5 °F (36.4 °C) Oral 93 18 96 % --   22 1224 -- -- -- -- 16 -- --   22 1045 (!) 139/59 -- -- 89 14 98 % --   22 1030 (!) 146/67 -- -- 81 20 96 % --   22 1015 (!) 151/71 -- -- 82 22 97 % --   22 1005 (!) 146/67 97.2 °F (36.2 °C) Temporal 81 18 97 % --   22 1000 (!) 159/83 -- -- 87 17 97 % --   22 0955 (!) 126/104 -- -- 85 25 (!) 88 % --   22 0952 (!) 159/83 97.2 °F (36.2 °C) Temporal 83 26 94 % --        TEMPERATURE HISTORY 24H: Temp (24hrs), Av.7 °F (36.5 °C), Min:97.2 °F (36.2 °C), Max:98.3 °F (36.8 °C)    BLOOD PRESSURE HISTORY: Systolic (89TKD), HMY:199 , Min:126 , CCT:911    Diastolic (50XVQ), XIZ:20, Min:59, Max:104      Intake/Output:    I/O last 3 completed shifts: In: 7254 [P.O.:120;  I.V.:1400; IV Piggyback:50]  Out: 0 [Urine:1400; Emesis/NG output:530; Drains:110]  I/O this shift:  In: -   Out: 50 [Urine:50]  Drain/tube Output:    Closed/Suction Drain Superior Abdomen Bulb-Output (ml): 10 ml      Physical Exam:  General: awake, alert, no acute distress, confused at baseline  Pulmonary: unlabored respirations  Abdomen: soft, non-distended, appropriate incisional tenderness only, hypoactive bowel sounds  Drain: serosanguinous JUNIOR output  Skin/Wound: JOANA dressing in place over midline incision, seal intact/pump working properly, only scant bloody drainage at lower aspect of dressing    Labs:  CBC:    Recent Labs     11/18/22  0744 11/19/22  0612 11/20/22  0615   WBC 9.4 9.3 10.7   HGB 12.3 11.9* 10.3*   HCT 36.7 35.7* 30.8*    245 229       BMP:    Recent Labs     11/18/22  0744 11/19/22  0612 11/20/22  0615    143 146*   K 4.0 3.6 4.1    107 113*   CO2 26 28 26   BUN 24* 19 20   CREATININE 0.7 0.6 0.6   GLUCOSE 154* 135* 125*       Hepatic:   Recent Labs     11/20/22  0615   AST 20   ALT 12   BILITOT 0.6   ALKPHOS 74       Amylase: No results for input(s): AMYLASE in the last 72 hours. Lipase:   No results for input(s): LIPASE in the last 72 hours. Mag:      Recent Labs     11/18/22  0744 11/20/22  0615   MG 2.30 2.20        Phos:     Recent Labs     11/18/22  0744 11/20/22  0615   PHOS 1.9* 2.7        Coags:   Recent Labs     11/20/22  0615   INR 1.06   APTT 32.4         Cultures:  Relevant cultures documented under results section     Pathology:   Pathology results pending     Imaging:  I have personally reviewed the following films:    XR ABDOMEN (2 VIEWS)    Result Date: 11/18/2022  EXAMINATION: TWO XRAY VIEWS OF THE ABDOMEN 11/18/2022 10:45 am COMPARISON: None. HISTORY: ORDERING SYSTEM PROVIDED HISTORY: F/u SBO TECHNOLOGIST PROVIDED HISTORY: Reason for exam:->F/u SBO Reason for Exam: abdominal pain FINDINGS: This study is done at 48 hours after initiation of the small bowel opacification procedure peer there is an NG tube looped/coiled in the proximal stomach and the tip of this 2 is in the mid body of the stomach. The stomach is nondistended and contains no positive contrast material. There is water-soluble contrast in the slightly dilated proximal small bowel.  The maximal opacified small bowel diameter is 3.8 cm. There is no opacification of the intestinal tract distal to the middle 3rd of the small bowel. There is no large bowel dilatation. Mechanical obstruction at the distal 3rd of the small bowel. Concomitant small bowel ileus. RECOMMENDATION: An additional delay radiograph of lower abdomen and pelvis at 24 hours from this exam 2 determine the exact level in the distal 3rd of the small bowel where there is an obstruction. Scheduled Meds:   sodium chloride flush  5-40 mL IntraVENous 2 times per day    enoxaparin  40 mg SubCUTAneous Daily    pantoprazole  40 mg IntraVENous Daily    piperacillin-tazobactam  3,375 mg IntraVENous Q8H    sodium chloride flush  5-40 mL IntraVENous 2 times per day     Continuous Infusions:   sodium chloride      sodium chloride      sodium chloride      sodium chloride 125 mL/hr at 11/19/22 1309     PRN Meds:.sodium chloride flush, sodium chloride, ondansetron **OR** ondansetron, phenol, morphine **OR** morphine, oxyCODONE **OR** oxyCODONE, acetaminophen, diazePAM, sodium chloride flush, [DISCONTINUED] acetaminophen **OR** acetaminophen, [DISCONTINUED] morphine **OR** morphine      Assessment:  Patient Active Problem List   Diagnosis    Age-related osteoporosis without current pathological fracture    White coat syndrome without hypertension    SBO (small bowel obstruction) ([facility] [address]     OR Date 11/19/2022, exploratory laparotomy, lysis of adhesions, small bowel and cecum resection with anastomosis for small bowel obstruction      Plan:  1. Pain controlled, no nausea/vomiting--NGT in place, no bowel function, vitals/labs stable; continued supportive care, JUNIOR drain care/monitor output, keep JOANA dressing in place, remove Carrillo catheter  2. NPO with NGT decompression; monitor bowel function  3. IV hydration; monitor and correct electrolytes  4. Antibiotics--on Zosyn  5. Activity as tolerated, ambulate TID--PT/OT evaluation and treatment  6. Pulmonary toilet, incentive spirometry  7. PRN analgesics and antiemetics--minimizing narcotics as tolerated  8. DVT prophylaxis with  Lovenox and SCD's  9. Management of medical comorbid etiologies per primary team and consulting services    EDUCATION:  Educated patient/family on plan of care and disease process--all questions answered. Plans discussed with patient/family and nursing. Reviewed and discussed with Dr. Kristen Medellin. Signed:  WIL Christianson - CNP  11/20/2022 9:14 AM    I have personally performed a face to face diagnostic evaluation on this patient. I have interviewed and examined the patient and I agree with the assessment above. Time was spent reviewing patient chart (including but not limited to notes, labs, imaging and other testing), interviewing and counseling patient and present family members, performing physical exam, documentation of my findings and subsequent follow up of ordered medication and testing, placing referrals and communication with patient care providers, coordinating future care as well as documentation in the EHR. This encompassed more than 50% of the total encounter time. In summary, my findings and plan are the following:   Ms. Florence Eaton is a 79 y.o. female who presents with   OR Date 11/19/2022, exploratory laparotomy, lysis of adhesions, small bowel and cecum resection with anastomosis for small bowel obstruction    Plan:  1. Pain controlled, minimize narcotics  2. Monitor bowel function, no flatus or stool  3. Bowel rest, NG decompression  4. IVF, monitor and replace electrolytes as needed  5. Increase activity as able, PT/OT  6. Antibiotics  7. Defer management of remainder of medical comorbidities to primary and consulting teams    Ki Medellin MD, FACS  11/20/2022  11:53 AM

## 2022-11-20 NOTE — PLAN OF CARE
Problem: ABCDS Injury Assessment  Goal: Absence of physical injury  11/20/2022 0804 by Sonam Gunderson RN  Outcome: Progressing  11/20/2022 0125 by Miley Bernard RN  Outcome: Progressing     Problem: Pain  Goal: Verbalizes/displays adequate comfort level or baseline comfort level  11/20/2022 0804 by Sonam Gunderson RN  Outcome: Progressing  11/20/2022 0125 by Miley Bernard RN  Outcome: Progressing     Problem: Skin/Tissue Integrity  Goal: Absence of new skin breakdown  Description: 1. Monitor for areas of redness and/or skin breakdown  2. Assess vascular access sites hourly  3. Every 4-6 hours minimum:  Change oxygen saturation probe site  4. Every 4-6 hours:  If on nasal continuous positive airway pressure, respiratory therapy assess nares and determine need for appliance change or resting period.   11/20/2022 0804 by Sonam Gunderson RN  Outcome: Progressing  11/20/2022 0125 by Miley Bernard RN  Outcome: Progressing     Problem: Safety - Adult  Goal: Free from fall injury  11/20/2022 0804 by Sonam Gunderson RN  Outcome: Progressing  11/20/2022 0125 by Miley Bernard RN  Outcome: Progressing     Problem: Discharge Planning  Goal: Discharge to home or other facility with appropriate resources  11/20/2022 0804 by Sonam Gunderson RN  Outcome: Progressing  Flowsheets (Taken 11/20/2022 0759)  Discharge to home or other facility with appropriate resources: Identify barriers to discharge with patient and caregiver  11/20/2022 0125 by Miley Bernard RN  Outcome: Progressing

## 2022-11-20 NOTE — PROGRESS NOTES
Shift assessment completed. Routine vitals stable. Scheduled medications given. Patient is awake, alert, confusion continues. Respirations are easy and unlabored. Patient c/o pain to left abdominal area with PRN MS given per IV. Call light within reach. Fall precautions in place. Friend at bedside until  comes in.

## 2022-11-21 LAB
ANION GAP SERPL CALCULATED.3IONS-SCNC: 8 MMOL/L (ref 3–16)
BASOPHILS ABSOLUTE: 0 K/UL (ref 0–0.2)
BASOPHILS RELATIVE PERCENT: 0.1 %
BUN BLDV-MCNC: 19 MG/DL (ref 7–20)
CALCIUM SERPL-MCNC: 8.1 MG/DL (ref 8.3–10.6)
CHLORIDE BLD-SCNC: 112 MMOL/L (ref 99–110)
CO2: 26 MMOL/L (ref 21–32)
CREAT SERPL-MCNC: 0.5 MG/DL (ref 0.6–1.2)
EOSINOPHILS ABSOLUTE: 0 K/UL (ref 0–0.6)
EOSINOPHILS RELATIVE PERCENT: 0.4 %
GFR SERPL CREATININE-BSD FRML MDRD: >60 ML/MIN/{1.73_M2}
GLUCOSE BLD-MCNC: 130 MG/DL (ref 70–99)
HCT VFR BLD CALC: 28.2 % (ref 36–48)
HEMOGLOBIN: 9.6 G/DL (ref 12–16)
LYMPHOCYTES ABSOLUTE: 0.9 K/UL (ref 1–5.1)
LYMPHOCYTES RELATIVE PERCENT: 8.4 %
MAGNESIUM: 2.1 MG/DL (ref 1.8–2.4)
MCH RBC QN AUTO: 31.9 PG (ref 26–34)
MCHC RBC AUTO-ENTMCNC: 34 G/DL (ref 31–36)
MCV RBC AUTO: 93.8 FL (ref 80–100)
MONOCYTES ABSOLUTE: 0.9 K/UL (ref 0–1.3)
MONOCYTES RELATIVE PERCENT: 8.4 %
NEUTROPHILS ABSOLUTE: 8.5 K/UL (ref 1.7–7.7)
NEUTROPHILS RELATIVE PERCENT: 82.7 %
PDW BLD-RTO: 13.6 % (ref 12.4–15.4)
PHOSPHORUS: 2.1 MG/DL (ref 2.5–4.9)
PLATELET # BLD: 240 K/UL (ref 135–450)
PMV BLD AUTO: 8.2 FL (ref 5–10.5)
POTASSIUM SERPL-SCNC: 3.7 MMOL/L (ref 3.5–5.1)
RBC # BLD: 3 M/UL (ref 4–5.2)
SODIUM BLD-SCNC: 146 MMOL/L (ref 136–145)
WBC # BLD: 10.2 K/UL (ref 4–11)

## 2022-11-21 PROCEDURE — 83735 ASSAY OF MAGNESIUM: CPT

## 2022-11-21 PROCEDURE — 2580000003 HC RX 258: Performed by: SURGERY

## 2022-11-21 PROCEDURE — 6370000000 HC RX 637 (ALT 250 FOR IP): Performed by: PHYSICIAN ASSISTANT

## 2022-11-21 PROCEDURE — 84100 ASSAY OF PHOSPHORUS: CPT

## 2022-11-21 PROCEDURE — 1200000000 HC SEMI PRIVATE

## 2022-11-21 PROCEDURE — 51798 US URINE CAPACITY MEASURE: CPT

## 2022-11-21 PROCEDURE — 2500000003 HC RX 250 WO HCPCS: Performed by: SURGERY

## 2022-11-21 PROCEDURE — 97530 THERAPEUTIC ACTIVITIES: CPT

## 2022-11-21 PROCEDURE — 97161 PT EVAL LOW COMPLEX 20 MIN: CPT

## 2022-11-21 PROCEDURE — C9113 INJ PANTOPRAZOLE SODIUM, VIA: HCPCS | Performed by: SURGERY

## 2022-11-21 PROCEDURE — 2580000003 HC RX 258: Performed by: PHYSICIAN ASSISTANT

## 2022-11-21 PROCEDURE — 97535 SELF CARE MNGMENT TRAINING: CPT

## 2022-11-21 PROCEDURE — 99024 POSTOP FOLLOW-UP VISIT: CPT | Performed by: SURGERY

## 2022-11-21 PROCEDURE — 97166 OT EVAL MOD COMPLEX 45 MIN: CPT

## 2022-11-21 PROCEDURE — 80048 BASIC METABOLIC PNL TOTAL CA: CPT

## 2022-11-21 PROCEDURE — 6360000002 HC RX W HCPCS: Performed by: SURGERY

## 2022-11-21 PROCEDURE — 85025 COMPLETE CBC W/AUTO DIFF WBC: CPT

## 2022-11-21 RX ORDER — DEXTROSE MONOHYDRATE 50 MG/ML
INJECTION, SOLUTION INTRAVENOUS CONTINUOUS
Status: DISCONTINUED | OUTPATIENT
Start: 2022-11-21 | End: 2022-11-23

## 2022-11-21 RX ORDER — ACETAMINOPHEN 325 MG/1
650 TABLET ORAL EVERY 4 HOURS PRN
Status: DISCONTINUED | OUTPATIENT
Start: 2022-11-21 | End: 2022-11-28 | Stop reason: HOSPADM

## 2022-11-21 RX ORDER — SODIUM CHLORIDE 9 MG/ML
INJECTION, SOLUTION INTRAVENOUS
Status: DISPENSED
Start: 2022-11-21 | End: 2022-11-21

## 2022-11-21 RX ORDER — ACETAMINOPHEN 500 MG
1000 TABLET ORAL EVERY 8 HOURS SCHEDULED
Status: DISCONTINUED | OUTPATIENT
Start: 2022-11-21 | End: 2022-11-28 | Stop reason: HOSPADM

## 2022-11-21 RX ADMIN — Medication 10 ML: at 14:10

## 2022-11-21 RX ADMIN — MORPHINE SULFATE 2 MG: 2 INJECTION, SOLUTION INTRAMUSCULAR; INTRAVENOUS at 06:35

## 2022-11-21 RX ADMIN — DEXTROSE AND SODIUM CHLORIDE: 5; 450 INJECTION, SOLUTION INTRAVENOUS at 04:24

## 2022-11-21 RX ADMIN — PIPERACILLIN AND TAZOBACTAM 3375 MG: 3; .375 INJECTION, POWDER, FOR SOLUTION INTRAVENOUS at 17:47

## 2022-11-21 RX ADMIN — DEXTROSE MONOHYDRATE: 50 INJECTION, SOLUTION INTRAVENOUS at 14:09

## 2022-11-21 RX ADMIN — ENOXAPARIN SODIUM 40 MG: 100 INJECTION SUBCUTANEOUS at 08:55

## 2022-11-21 RX ADMIN — ACETAMINOPHEN 1000 MG: 500 TABLET ORAL at 23:19

## 2022-11-21 RX ADMIN — SODIUM CHLORIDE: 9 INJECTION, SOLUTION INTRAVENOUS at 01:10

## 2022-11-21 RX ADMIN — PANTOPRAZOLE SODIUM 40 MG: 40 INJECTION, POWDER, FOR SOLUTION INTRAVENOUS at 08:54

## 2022-11-21 RX ADMIN — PIPERACILLIN AND TAZOBACTAM 3375 MG: 3; .375 INJECTION, POWDER, FOR SOLUTION INTRAVENOUS at 01:11

## 2022-11-21 RX ADMIN — PIPERACILLIN AND TAZOBACTAM 3375 MG: 3; .375 INJECTION, POWDER, FOR SOLUTION INTRAVENOUS at 09:00

## 2022-11-21 RX ADMIN — ACETAMINOPHEN 1000 MG: 500 TABLET ORAL at 14:07

## 2022-11-21 RX ADMIN — SODIUM PHOSPHATE, MONOBASIC, MONOHYDRATE 20 MMOL: 276; 142 INJECTION, SOLUTION INTRAVENOUS at 16:33

## 2022-11-21 RX ADMIN — MORPHINE SULFATE 2 MG: 2 INJECTION, SOLUTION INTRAMUSCULAR; INTRAVENOUS at 08:54

## 2022-11-21 RX ADMIN — Medication 10 ML: at 23:22

## 2022-11-21 ASSESSMENT — PAIN DESCRIPTION - DESCRIPTORS: DESCRIPTORS: ACHING

## 2022-11-21 ASSESSMENT — PAIN SCALES - GENERAL
PAINLEVEL_OUTOF10: 6
PAINLEVEL_OUTOF10: 6
PAINLEVEL_OUTOF10: 4
PAINLEVEL_OUTOF10: 0

## 2022-11-21 ASSESSMENT — PAIN DESCRIPTION - PAIN TYPE: TYPE: SURGICAL PAIN

## 2022-11-21 ASSESSMENT — PAIN DESCRIPTION - LOCATION
LOCATION: ABDOMEN

## 2022-11-21 ASSESSMENT — PAIN DESCRIPTION - ONSET: ONSET: ON-GOING

## 2022-11-21 ASSESSMENT — PAIN DESCRIPTION - ORIENTATION: ORIENTATION: LOWER

## 2022-11-21 ASSESSMENT — PAIN DESCRIPTION - FREQUENCY: FREQUENCY: CONTINUOUS

## 2022-11-21 NOTE — PROGRESS NOTES
Pt assessment completed and documented. Pt requested to get up to chair. Assisted pt (x2 people) up to chair. Pt had c/o abd pain, medicated per physician order (see MAR). Pt denied n/v at this time. Pt had hypoactive bowel sounds, no bm this shift.

## 2022-11-21 NOTE — PROGRESS NOTES
[facility]   Phone: [phone]    Physical Therapy    [x] Initial Evaluation            [] Daily Treatment Note         [] Discharge Summary      Patient: Michael Donahue   : 1940   MRN: [id number]   Date of Service:  2022  Admitting Diagnosis: SBO (small bowel obstruction) ([facility] [address]  Current Admission Summary: Pt admitted with partial SBO. General surgery consulted. Conservative management with NG tube failed. S/p exploratory laparotomy with small bowel and ileal resection 22. Past Medical History:  has a past medical history of Age-related osteoporosis without current pathological fracture, Arthritis, and Osteoporosis. Past Surgical History:  has a past surgical history that includes other surgical history (); Hysterectomy (); Vein Surgery (Right); Colonoscopy; eye surgery (Right); skin biopsy; and laparotomy (N/A, 2022). Discharge Recommendations: Michael Donahue scored a 13/24 on the AM-PAC short mobility form. Current research shows that an AM-PAC score of 17 or less is typically not associated with a discharge to the patient's home setting. Based on the patient's AM-PAC score and their current functional mobility deficits, it is recommended that the patient have 5-7 sessions per week of Physical Therapy at d/c to increase the patient's independence. At this time, this patient demonstrates complex nursing, medical, and rehabilitative needs, and would benefit from intensive rehabilitation services upon discharge from the Inpatient setting. This patient demonstrates the ability to participate in and benefit from an intensive therapy program with a coordinated interdisciplinary team approach to foster frequent, structured, and documented communication among disciplines, who will work together to establish, prioritize, and achieve treatment goals. Please see assessment section for further patient specific details.     If patient discharges prior to next session this note will serve as a discharge summary. Please see below for the latest assessment towards goals. DME Required For Discharge: DME to be determined at next level of care, DME to be determined pending patient progress  Precautions/Restrictions: high fall risk, modified diet  Weight Bearing Restrictions: no restrictions  [] Right Upper Extremity  [] Left Upper Extremity [] Right Lower Extremity  [] Left Lower Extremity     Required Braces/Orthotics: no braces required   [] Right  [] Left  Positional Restrictions:no positional restrictions    Pre-Admission Information   Lives With: spouse                  Type of Home: house  Home Layout: one level  Home Access: level entry  Bathroom Layout: tub/shower unit  Bathroom Equipment:  no prior equipment  Toilet Height: standard height, elevated height  Home Equipment: no prior equipment  Transfer Assistance: Independent without use of device  Ambulation Assistance:Independent without use of device  ADL Assistance: independent with all ADL's  IADL Assistance:  shares all home making responsibilities with spouse  Active :        [x] Yes                 [] No  Hand Dominance: [] Left                 [x] Right  Current Employment: retired. 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 Hobbies: \"working with other people\"  Recent Falls: denies recent falls. Examination   Vision:   Vision Gross Assessment: Impaired and Vision Corrective Device: wears glasses at all times  Hearing:   [facility]  Observation:   General Observation:  NGT in place  Posture:   Good  Sensation:   WFL  ROM:   (B) LE AROM WFL  Strength:   (B) LE strength grossly 3  Therapist Clinical Decision Making (Complexity): low complexity  Clinical Presentation: stable      Subjective  General: Pt supine in bed upon arrival. Agreeable to PT/OT eval. Spouse present at beginning and end of session. Pain: 4/10.   Location: abdomen  Pain Interventions: pain medication in place prior to arrival, RN notified, and repositioned        Functional Mobility  Bed Mobility  Supine to Sit: moderate assistance  Scooting: stand by assistance  Comments: HOB raised, required increased time to perform sup>sit with HOB raised and VC for use of bedrails  Transfers  Sit to stand transfer: Min A from EOB; Min+Mod A from BSC; CGA from EOB  Stand to sit transfer: minimal assistance  Stand step transfer: minimal assistance (EOB>BSC>EOB>recliner)  Comments: Pt required increased time to perform all transfers with mod VC for safe hand placement. Ambulation  Ambulation not tested on this date secondary to pt fatigue. Distance: n/a  Gait Mechanics: n/a  Comments:    Stair Mobility  Stair mobility not completed on this date. Comments:  Wheelchair Mobility:  No w/c mobility completed on this date. Comments:  Balance  Static Sitting Balance: fair (-): maintains balance at SBA with use of UE support  Dynamic Sitting Balance: fair (-): maintains balance at SBA with use of UE support  Static Standing Balance: poor (+): requires min (A) to maintain balance  Dynamic Standing Balance: poor (+): requires min (A) to maintain balance  Comments: Pt sat at [facility] for ADLs ~15-18 minutes total with SBA for balance. Pt stood ~2-3 minutes with B HHA with Min A for balance while PT assisting with dependent pericare.      Other Therapeutic Interventions    Functional Outcomes  AM-PAC Inpatient Mobility Raw Score : 13              Cognition  Overall Cognitive Status: Impaired  Problem Solving: assistance required to generate solutions, assistance required to implement solutions, assistance required to identify errors made, assistance required to correct errors made  Insights: decreased awareness of deficits  Initiation: requires cues for some  Sequencing: requires cues for some  Orientation:    oriented to person, oriented to time, disoriented to place, and disoriented to situation  Command Following:   [facility]    Education  Barriers To Learning: cognition  Patient Education: patient educated on goals, PT role and benefits, plan of care, general safety, functional mobility training, orientation, family education, transfer training, discharge recommendations  Learning Assessment:  patient verbalizes understanding, would benefit from continued reinforcement, patient will require reinforcement due to cognitive deficits, spouse verbalizing understanding    Assessment  Activity Tolerance: Fair, limited by endurance and fatigue  Impairments Requiring Therapeutic Intervention: decreased functional mobility, decreased strength, decreased cognition, decreased endurance, decreased balance  Prognosis: good  Clinical Assessment: Pt presents with the above deficits impairing her ability to perform functional mobility safely and independently. Pt with PLOF of independence and currently requires min/mod A for very limited mobility. Pt would benefit from acute PT services to address deficits and facilitate return to PLOF.     Safety Interventions: patient left in chair, chair alarm in place, call light within reach, gait belt, patient at risk for falls, nurse notified, and family/caregiver present    Plan  Frequency: 3-5 x/per week  Current Treatment Recommendations: strengthening, balance training, functional mobility training, transfer training, gait training, endurance training, neuromuscular re-education, modalities, patient/caregiver education, home exercise program, safety education, equipment evaluation/education, and positioning    Goals  Patient Goals: pt did not state   Short Term Goals:  Time Frame: upon d/c  Short term goal 1: Pt will perform bed mobility with supervision  Short term goal 2: Pt will perform transfers with LRAD and SBA  Short term goal 3: Pt will ambulate 22' with LRAD and CGA    Therapy Session Time      Individual Group Co-treatment   Time In     1500   Time Out     1610   Minutes     70 Timed Code Treatment Minutes:   55  Total Treatment Minutes:  70       Electronically Signed By: Jasen Ley, [id number] [facility], DPT [id number]

## 2022-11-21 NOTE — PROGRESS NOTES
Nutrition Note    RECOMMENDATIONS  PO Diet: NPO. Diet per surgery. Nutrition Support: If po diet is unable to be started within 24 hours, recommend start nutrition support. NUTRITION ASSESSMENT   Pt triggered for NPO status x 7 days. S/p exp lap with ROBERTO, small bowel and cecum resection with anastomosis for SBO  11/19. NG tube in place to suction. Receiving D5/45NS at 100 mL/hr which provides 408 calories per day from dextrose. Wt hx in EMR indicates no significant wt change prior to current admission. If po diet is unable to be started within 24 hours, recommend start nutrition support. RD will continue to monitor. Nutrition Related Findings: +2.5L. Na 146, Cl 112, Phos 2.1. LBM 11/14, BS hypoactive. Oriented x2 to person/place. No edema noted. Wounds: Surgical Incision, Wound Vac  Nutrition Education:  Education not indicated   Nutrition Goals: Initiate PO diet, Initiate nutrition support, by next RD assessment     MALNUTRITION ASSESSMENT   Acute Illness  Malnutrition Status: At risk for malnutrition (Comment)    NUTRITION DIAGNOSIS   Inadequate protein-energy intake related to altered GI function as evidenced by NPO or clear liquid status due to medical condition    CURRENT NUTRITION THERAPIES  Diet NPO Exceptions are: Sips of Water with Meds, Ice Chips, Sips of Clear Liquids     PO Intake: NPO   PO Supplement Intake:NPO    ANTHROPOMETRICS  Current Height: 5' 3\" (160 cm)  Current Weight: 128 lb 8 oz (58.3 kg)    Admission weight: 118 lb (53.5 kg) (bed wt)  Ideal Body Weight (IBW): 115 lbs  (52 kg)        BMI: 22.7    COMPARATIVE STANDARDS  Energy (kcal):  2227-0214     Protein (g):         Fluid (mL/day):  2044-2580    The patient will be monitored per nutrition standards of care. Consult dietitian if additional nutrition interventions are needed prior to RD reassessment.      Reena Trevino MS, RD, LD    Contact: [phone]

## 2022-11-21 NOTE — PROGRESS NOTES
[facility] 82 [facility]  Progress Note    Pt Name: Martha Reeder  MRN: [id number]  [place]: 1940  Date of Evaluation: 2022    Subjective:    No acute events overnight  Pain controlled  No nausea with NG, frustrated with its presence though  No flatus or stool  Resting in bed at this time    Postoperative Day #2    Vital Signs:  Patient Vitals for the past 24 hrs:   BP Temp Temp src Pulse Resp SpO2 Weight   22 0635 -- -- -- -- 18 -- --   22 0427 (!) 157/76 97.6 °F (36.4 °C) Axillary 75 18 95 % 128 lb 8 oz (58.3 kg)   22 2355 132/72 97.9 °F (36.6 °C) Oral 78 18 93 % --   22 1956 130/71 99.2 °F (37.3 °C) Axillary 79 18 96 % --   22 1700 134/78 97 °F (36.1 °C) Axillary 85 16 95 % --   22 1430 131/73 97.8 °F (36.6 °C) Axillary 85 16 94 % --   22 1338 -- -- -- -- 16 -- --      TEMPERATURE HISTORY 24H: Temp (24hrs), Av.9 °F (36.6 °C), Min:97 °F (36.1 °C), Max:99.2 °F (37.3 °C)    BLOOD PRESSURE HISTORY: Systolic (94DLM), KGU:292 , Min:130 , IEM:569    Diastolic (55SSI), BFF:88, Min:71, Max:78      Intake/Output:    I/O last 3 completed shifts: In: 6641.6 [P.O.:180; I.V.:6411.6; IV Piggyback:50]  Out:  [Urine:950; Emesis/NG output:925; Drains:95]  No intake/output data recorded.   Drain/tube Output:    Closed/Suction Drain Superior Abdomen Bulb-Output (ml): 5 ml  Negative Pressure Wound Therapy Abdomen Medial-Output (ml): 0 ml      Physical Exam:  General: awake, alert, no acute distress, confused at baseline  Abdomen: soft, non-distended, appropriate incisional tenderness to palpation, JOANA dressing in place, will change in 1-2 days, JUNIOR serosanguineous    Labs:  CBC:    Recent Labs     22  0612 22  0615 22  0638   WBC 9.3 10.7 10.2   HGB 11.9* 10.3* 9.6*   HCT 35.7* 30.8* 28.2*    229 240     BMP:    Recent Labs     22  0612 22  0615 22  0638    146* 146*   K 3.6 4.1 3.7    113* 112*   CO2 28 26 26   BUN 19 20 19   CREATININE 0.6 0.6 0.5*   GLUCOSE 135* 125* 130*     Hepatic:   Recent Labs     11/20/22  0615   AST 20   ALT 12   BILITOT 0.6   ALKPHOS 74     Amylase: No results for input(s): AMYLASE in the last 72 hours. Lipase:   No results for input(s): LIPASE in the last 72 hours. Mag:      Recent Labs     11/20/22  0615 11/21/22  0638   MG 2.20 2.10      Phos:     Recent Labs     11/20/22  0615 11/21/22  0638   PHOS 2.7 2.1*      Coags:   Recent Labs     11/20/22  0615   INR 1.06   APTT 32.4       Cultures:  Relevant cultures documented under results section     Pathology:   Pathology results pending     Imaging:  I have personally reviewed the following films:    XR ABDOMEN (2 VIEWS)    Result Date: 11/18/2022  EXAMINATION: TWO XRAY VIEWS OF THE ABDOMEN 11/18/2022 10:45 am COMPARISON: None. HISTORY: ORDERING SYSTEM PROVIDED HISTORY: F/u SBO TECHNOLOGIST PROVIDED HISTORY: Reason for exam:->F/u SBO Reason for Exam: abdominal pain FINDINGS: This study is done at 48 hours after initiation of the small bowel opacification procedure peer there is an NG tube looped/coiled in the proximal stomach and the tip of this 2 is in the mid body of the stomach. The stomach is nondistended and contains no positive contrast material. There is water-soluble contrast in the slightly dilated proximal small bowel. The maximal opacified small bowel diameter is 3.8 cm. There is no opacification of the intestinal tract distal to the middle 3rd of the small bowel. There is no large bowel dilatation. Mechanical obstruction at the distal 3rd of the small bowel. Concomitant small bowel ileus. RECOMMENDATION: An additional delay radiograph of lower abdomen and pelvis at 24 hours from this exam 2 determine the exact level in the distal 3rd of the small bowel where there is an obstruction.       Scheduled Meds:   sodium chloride flush  5-40 mL IntraVENous 2 times per day    enoxaparin  40 mg SubCUTAneous Daily pantoprazole  40 mg IntraVENous Daily    piperacillin-tazobactam  3,375 mg IntraVENous Q8H    sodium chloride flush  5-40 mL IntraVENous 2 times per day     Continuous Infusions:   sodium chloride      dextrose 5 % and 0.45 % NaCl 100 mL/hr at 11/21/22 0424    sodium chloride 5 mL/hr at 11/21/22 0110     PRN Meds:.sodium chloride flush, sodium chloride, ondansetron **OR** ondansetron, phenol, morphine **OR** morphine, oxyCODONE **OR** oxyCODONE, acetaminophen, diazePAM, sodium chloride flush, [DISCONTINUED] acetaminophen **OR** acetaminophen, [DISCONTINUED] morphine **OR** morphine      Assessment:  Patient Active Problem List   Diagnosis    Age-related osteoporosis without current pathological fracture    White coat syndrome without hypertension    [facility] ([facility]     Ms. Chavez Rene is a 79 y.o. female who presents with   OR Date 11/19/2022, exploratory laparotomy, lysis of adhesions, small bowel and cecum resection with anastomosis for small bowel obstruction    Plan:  1. Pain controlled, minimize narcotics  2. Monitor bowel function, no flatus or stool  3. Bowel rest, NG decompression  4. IVF, monitor and replace electrolytes as needed  5. Increase activity as able, PT/OT  6. Antibiotics  7. Defer management of remainder of medical comorbidities to primary and consulting teams    Ki Pastrana MD, FACS  11/21/2022  8:28 AM

## 2022-11-21 NOTE — PROGRESS NOTES
Hospitalist Progress Note      PCP: Alma Inman MD    Date of Admission: 11/14/2022    Chief Complaint: Abd pain and nausea    Hospital Course: admitted with partial SBO. General surgery consulted. Conservative management with NG tube failed. Subjective: Patient feeling ok. Confused today. No flatus or BM. PO day 2 from exploratory laparotomy with small bowel and ileal resection. She is up in chair. Medications:  Reviewed    Infusion Medications    sodium chloride      dextrose 5 % and 0.45 % NaCl 100 mL/hr at 11/21/22 0424    sodium chloride 5 mL/hr at 11/21/22 0110     Scheduled Medications    sodium chloride flush  5-40 mL IntraVENous 2 times per day    enoxaparin  40 mg SubCUTAneous Daily    pantoprazole  40 mg IntraVENous Daily    piperacillin-tazobactam  3,375 mg IntraVENous Q8H    sodium chloride flush  5-40 mL IntraVENous 2 times per day     PRN Meds: sodium chloride flush, sodium chloride, ondansetron **OR** ondansetron, phenol, morphine **OR** morphine, oxyCODONE **OR** oxyCODONE, acetaminophen, diazePAM, sodium chloride flush, [DISCONTINUED] acetaminophen **OR** acetaminophen, [DISCONTINUED] morphine **OR** morphine      Intake/Output Summary (Last 24 hours) at 11/21/2022 1244  Last data filed at 11/21/2022 0427  Gross per 24 hour   Intake 5971.63 ml   Output 845 ml   Net 5126.63 ml         Physical Exam Performed:    BP (!) 157/69   Pulse 65   Temp 97.1 °F (36.2 °C) (Axillary)   Resp 18   Ht 5' 3\" (1.6 m)   Wt 128 lb 8 oz (58.3 kg)   SpO2 95%   BMI 22.76 kg/m²     General appearance: No apparent distress, appears stated age and cooperative. fatigued overall. NG in place. HEENT: Pupils equal, round, and reactive to light. Conjunctivae/corneas clear. Neck: Supple, with full range of motion. No jugular venous distention. Trachea midline. Respiratory:  Normal respiratory effort. Clear to auscultation, bilaterally without Rales/Wheezes/Rhonchi.   Cardiovascular: Regular rate and rhythm with normal S1/S2 without murmurs, rubs or gallops. Abdomen: Soft,   moderate diffuse abd tenderness to palpation. Non-distended with normal bowel sounds. Musculoskeletal: No clubbing, cyanosis or edema bilaterally. Full range of motion without deformity. Skin: Skin color, texture, turgor normal.  No rashes or lesions. Neurologic:  Neurovascularly intact without any focal sensory/motor deficits. Cranial nerves: II-XII intact, grossly non-focal.  Psychiatric: Alert and oriented, thought content appropriate, normal insight  Capillary Refill: Brisk, 3 seconds, normal   Peripheral Pulses: +2 palpable, equal bilaterally       Labs:   Recent Labs     11/19/22  0612 11/20/22  0615 11/21/22  0638   WBC 9.3 10.7 10.2   HGB 11.9* 10.3* 9.6*   HCT 35.7* 30.8* 28.2*    229 240       Recent Labs     11/19/22  0612 11/20/22  0615 11/21/22  0638    146* 146*   K 3.6 4.1 3.7    113* 112*   CO2 28 26 26   BUN 19 20 19   CREATININE 0.6 0.6 0.5*   CALCIUM 8.5 7.8* 8.1*   PHOS  --  2.7 2.1*       Recent Labs     11/20/22  0615   AST 20   ALT 12   BILITOT 0.6   ALKPHOS 74         Recent Labs     11/20/22  0615   INR 1.06         No results for input(s): Shweta Benderford in the last 72 hours. Urinalysis:      Lab Results   Component Value Date/Time    NITRU Negative 11/17/2022 07:15 PM    WBCUA 14 11/17/2022 07:15 PM    BACTERIA 2+ 11/17/2022 07:15 PM    RBCUA 8 11/17/2022 07:15 PM    BLOODU MODERATE 11/17/2022 07:15 PM    SPECGRAV >=1.030 11/17/2022 07:15 PM    GLUCOSEU Negative 11/17/2022 07:15 PM       Radiology:  XR ABDOMEN (2 VIEWS)   Final Result   Mechanical obstruction at the distal 3rd of the small bowel. Concomitant   small bowel ileus. RECOMMENDATION:   An additional delay radiograph of lower abdomen and pelvis at 24 hours from   this exam 2 determine the exact level in the distal 3rd of the small bowel   where there is an obstruction.          XR ABDOMEN (KUB) (SINGLE AP VIEW)   Final Result   Distal small-bowel obstruction pattern. It would be helpful to position the patient right lateral decubitus, in order   to encourage gastric emptying. FL SMALL BOWEL FOLLOW THROUGH ONLY   Final Result   Small-bowel follow-through study showing significant retention of contrast in   the lumen of the stomach and only a small amount of transit into the small   bowel that is very dilute. Pattern may represent a partial small bowel   obstruction. Gastroparesis or ileus may also be considered. XR ABDOMEN FOR NG/OG/NE TUBE PLACEMENT   Final Result   Looped configuration of nasogastric tube, coiled at the stomach and   redirected to the midesophagus level. Withdrawal of 20 cm is suggested,   followed by readvancement. XR ABDOMEN FOR NG/OG/NE TUBE PLACEMENT   Final Result   Nasogastric tube projects in normal position. CT ABDOMEN PELVIS W IV CONTRAST Additional Contrast? None   Final Result   1. Loops of mildly dilated fluid-filled small bowel in the pelvis which   appear to represent ileum. No clear transition point is seen. Air is still   seen distally in the colon and the proximal duodenal and jejunal loops do not   appear dilated. This likely represents a partial small bowel obstruction. 2.  Colonic diverticulosis without evidence of diverticulitis. XR CHEST PORTABLE   Final Result   No acute cardiopulmonary disease             Assessment/Plan:    Active Hospital Problems    Diagnosis     SBO (small bowel obstruction) ([facility] [K56.609]      Priority: Medium     Partial small bowel obstruction: PO day 2 from exploratory laparotomy with ROBERTO, small bowel and cecum resection. Has NG, continue fluids. Discussed with Dr Lata Guillermo. Will keep on zosyn as she had bowel ischemia. 2. Hypernatremia-change fluids to R0H.     3. Metabolic encephalopathy-suspect secondary to morphine. Will schedule tylenol.     4. Awaiting PT/OT eval        DVT Prophylaxis: Lovenox  Diet: Diet NPO Exceptions are: Sips of Water with Meds, Ice Chips, Sips of Clear Liquids  Code Status: Full Code  PT/OT Eval Status:             Ruben Ken PA-C

## 2022-11-21 NOTE — PLAN OF CARE
Problem: ABCDS Injury Assessment  Goal: Absence of physical injury  11/21/2022 1121 by Tree Garces RN  Outcome: Progressing  11/21/2022 0209 by Dave Bermudez RN  Outcome: Progressing     Problem: Pain  Goal: Verbalizes/displays adequate comfort level or baseline comfort level  11/21/2022 1121 by Tree Garces RN  Outcome: Progressing  Flowsheets (Taken 11/21/2022 0830)  Verbalizes/displays adequate comfort level or baseline comfort level:   Encourage patient to monitor pain and request assistance   Assess pain using appropriate pain scale  11/21/2022 0209 by Dave Bermudez RN  Outcome: Progressing     Problem: Skin/Tissue Integrity  Goal: Absence of new skin breakdown  Description: 1. Monitor for areas of redness and/or skin breakdown  2. Assess vascular access sites hourly  3. Every 4-6 hours minimum:  Change oxygen saturation probe site  4. Every 4-6 hours:  If on nasal continuous positive airway pressure, respiratory therapy assess nares and determine need for appliance change or resting period.   11/21/2022 1121 by Tree Garces RN  Outcome: Progressing  11/21/2022 0209 by Dave Bermudez RN  Outcome: Progressing     Problem: Safety - Adult  Goal: Free from fall injury  11/21/2022 1121 by Tree Garces RN  Outcome: Progressing  11/21/2022 0209 by Dave Bermudez RN  Outcome: Progressing     Problem: Discharge Planning  Goal: Discharge to home or other facility with appropriate resources  11/21/2022 1121 by Tree Garces RN  Outcome: Progressing  Flowsheets (Taken 11/21/2022 0800)  Discharge to home or other facility with appropriate resources:   Identify barriers to discharge with patient and caregiver   Arrange for needed discharge resources and transportation as appropriate   Identify discharge learning needs (meds, wound care, etc)  11/21/2022 0209 by Dave Bermudez RN  Outcome: Progressing

## 2022-11-21 NOTE — PROGRESS NOTES
Hospitalist Progress Note      PCP: Gio Talavera MD    Date of Admission: 11/14/2022    Chief Complaint: Abd pain and nausea    Hospital Course: admitted with partial SBO. General surgery consulted. Conservative management with NG tube failed. Subjective: Patient feeling better today. No flatus or BM. PO day 1 from exploratory laparotomy with small bowel and ileal resection. No cough or SOB. Medications:  Reviewed    Infusion Medications    dextrose 5 % and 0.45 % NaCl 100 mL/hr at 11/20/22 1437    sodium chloride       Scheduled Medications    sodium chloride flush  5-40 mL IntraVENous 2 times per day    enoxaparin  40 mg SubCUTAneous Daily    pantoprazole  40 mg IntraVENous Daily    piperacillin-tazobactam  3,375 mg IntraVENous Q8H    sodium chloride flush  5-40 mL IntraVENous 2 times per day     PRN Meds: sodium chloride flush, sodium chloride, ondansetron **OR** ondansetron, phenol, morphine **OR** morphine, oxyCODONE **OR** oxyCODONE, acetaminophen, diazePAM, sodium chloride flush, [DISCONTINUED] acetaminophen **OR** acetaminophen, [DISCONTINUED] morphine **OR** morphine      Intake/Output Summary (Last 24 hours) at 11/20/2022 2053  Last data filed at 11/20/2022 1701  Gross per 24 hour   Intake 730 ml   Output 1445 ml   Net -715 ml         Physical Exam Performed:    /71   Pulse 79   Temp 99.2 °F (37.3 °C) (Axillary)   Resp 18   Ht 5' 3\" (1.6 m)   Wt 117 lb 9.6 oz (53.3 kg)   SpO2 96%   BMI 20.83 kg/m²     General appearance: No apparent distress, appears stated age and cooperative. fatigued overall. NG in place. HEENT: Pupils equal, round, and reactive to light. Conjunctivae/corneas clear. Neck: Supple, with full range of motion. No jugular venous distention. Trachea midline. Respiratory:  Normal respiratory effort. Clear to auscultation, bilaterally without Rales/Wheezes/Rhonchi.   Cardiovascular: Regular rate and rhythm with normal S1/S2 without murmurs, rubs or gallops. Abdomen: Soft,   moderate diffuse abd tenderness to palpation. Non-distended with normal bowel sounds. Musculoskeletal: No clubbing, cyanosis or edema bilaterally. Full range of motion without deformity. Skin: Skin color, texture, turgor normal.  No rashes or lesions. Neurologic:  Neurovascularly intact without any focal sensory/motor deficits. Cranial nerves: II-XII intact, grossly non-focal.  Psychiatric: Alert and oriented, thought content appropriate, normal insight  Capillary Refill: Brisk, 3 seconds, normal   Peripheral Pulses: +2 palpable, equal bilaterally       Labs:   Recent Labs     11/18/22  0744 11/19/22  0612 11/20/22  0615   WBC 9.4 9.3 10.7   HGB 12.3 11.9* 10.3*   HCT 36.7 35.7* 30.8*    245 229       Recent Labs     11/18/22  0744 11/19/22  0612 11/20/22  0615    143 146*   K 4.0 3.6 4.1    107 113*   CO2 26 28 26   BUN 24* 19 20   CREATININE 0.7 0.6 0.6   CALCIUM 8.6 8.5 7.8*   PHOS 1.9*  --  2.7       Recent Labs     11/20/22  0615   AST 20   ALT 12   BILITOT 0.6   ALKPHOS 74       Recent Labs     11/20/22  0615   INR 1.06       No results for input(s): Corky Stallion in the last 72 hours. Urinalysis:      Lab Results   Component Value Date/Time    NITRU Negative 11/17/2022 07:15 PM    WBCUA 14 11/17/2022 07:15 PM    BACTERIA 2+ 11/17/2022 07:15 PM    RBCUA 8 11/17/2022 07:15 PM    BLOODU MODERATE 11/17/2022 07:15 PM    SPECGRAV >=1.030 11/17/2022 07:15 PM    GLUCOSEU Negative 11/17/2022 07:15 PM       Radiology:  XR ABDOMEN (2 VIEWS)   Final Result   Mechanical obstruction at the distal 3rd of the small bowel. Concomitant   small bowel ileus. RECOMMENDATION:   An additional delay radiograph of lower abdomen and pelvis at 24 hours from   this exam 2 determine the exact level in the distal 3rd of the small bowel   where there is an obstruction. XR ABDOMEN (KUB) (SINGLE AP VIEW)   Final Result   Distal small-bowel obstruction pattern.       It would be helpful to position the patient right lateral decubitus, in order   to encourage gastric emptying. FL SMALL BOWEL FOLLOW THROUGH ONLY   Final Result   Small-bowel follow-through study showing significant retention of contrast in   the lumen of the stomach and only a small amount of transit into the small   bowel that is very dilute. Pattern may represent a partial small bowel   obstruction. Gastroparesis or ileus may also be considered. XR ABDOMEN FOR NG/OG/NE TUBE PLACEMENT   Final Result   Looped configuration of nasogastric tube, coiled at the stomach and   redirected to the midesophagus level. Withdrawal of 20 cm is suggested,   followed by readvancement. XR ABDOMEN FOR NG/OG/NE TUBE PLACEMENT   Final Result   Nasogastric tube projects in normal position. CT ABDOMEN PELVIS W IV CONTRAST Additional Contrast? None   Final Result   1. Loops of mildly dilated fluid-filled small bowel in the pelvis which   appear to represent ileum. No clear transition point is seen. Air is still   seen distally in the colon and the proximal duodenal and jejunal loops do not   appear dilated. This likely represents a partial small bowel obstruction. 2.  Colonic diverticulosis without evidence of diverticulitis. XR CHEST PORTABLE   Final Result   No acute cardiopulmonary disease             Assessment/Plan:    Active Hospital Problems    Diagnosis     SBO (small bowel obstruction) ([facility] [address] [K56.609]      Priority: Medium     Partial small bowel obstruction: PO day 1 from exploratory laparotomy with ROBERTO, small bowel and cecum resection. Has NG, continue fluids. Discussed with Dr Lata Guillermo. Will keep on zosyn as she had bowel ischemia. 2. Hypernatremia-change fluids to D51/2 NS.      3. Consult PT/OT        DVT Prophylaxis: Lovenox  Diet: Diet NPO Exceptions are: Sips of Water with Meds, Ice Chips, Sips of Clear Liquids  Code Status: Full Code  PT/OT Eval Status:             Dana Merchant BRADEN Sadler

## 2022-11-21 NOTE — PROGRESS NOTES
[facility]   Phone: [phone]    Occupational Therapy    [x] Initial Evaluation            [] Daily Treatment Note         [] Discharge Summary      Patient: Eamon Bardales   : 1940   MRN: [id number]   Date of Service:  2022    Admitting Diagnosis:  SBO (small bowel obstruction) ([address]  Current Admission Summary: Eamon Bardales is a 79 y.o. female who presented to ED for evaluation of left sided abdominal pain which began around 11 am.  She reports associated nausea. She denies vomiting, diarrhea, constipation. She denies fever, chest pain, shortness of breath, cough, edema, urinary symptoms. She has a history of total hysterectomy. She denies history of any other abdominal surgeries. Reviewed labs and imaging performed in ED. CT concerning for partial SBO. General surgery consulted in ED and recommended pain control and NGT and will see patient tomorrow. Lactic acid slightly elevated but improved with IVF 2.5 > 2.1. Otherwise labs unremarkable. S/p--EXPLORATORY LAPAROTOMY, LYSIS OF ADHESIONS, POSSIBLE BOWEL RESECTION on     Past Medical History:  has a past medical history of Age-related osteoporosis without current pathological fracture, Arthritis, and Osteoporosis. Past Surgical History:  has a past surgical history that includes other surgical history (); Hysterectomy (); Vein Surgery (Right); Colonoscopy; eye surgery (Right); skin biopsy; and laparotomy (N/A, 2022). Discharge Recommendations: Eamon Bardales scored a 12/24 on the AM-PAC ADL Inpatient form. Current research shows that an AM-PAC score of 17 or less is typically not associated with a discharge to the patient's home setting. Based on the patient's AM-PAC score and their current ADL deficits, it is recommended that the patient have 5-7 sessions per week of Occupational Therapy at d/c to increase the patient's independence.   At this time, this patient demonstrates complex nursing, medical, and rehabilitative needs, and would benefit from intensive rehabilitation services upon discharge from the Inpatient setting. This patient demonstrates the ability to participate in and benefit from an intensive therapy program with a coordinated interdisciplinary team approach to foster frequent, structured, and documented communication among disciplines, who will work together to establish, prioritize, and achieve treatment goals. Please see assessment section for further patient specific details. If patient discharges prior to next session this note will serve as a discharge summary. Please see below for the latest assessment towards goals. DME Required For Discharge: DME to be determined at next level of care, DME to be determined pending patient progress    Precautions/Restrictions: high fall risk  Weight Bearing Restrictions: no restrictions--currently NPO with sips of water with meds    [] Right Upper Extremity  [] Left Upper Extremity [] Right Lower Extremity  [] Left Lower Extremity     Required Braces/Orthotics: no braces required   [] Right  [] Left  Positional Restrictions:no positional restrictions    Pre-Admission Information   Lives With: spouse    Type of Home: house  Home Layout: one level  Home Access: level entry  Bathroom Layout: tub/shower unit  Bathroom Equipment:  no prior equipment  Toilet Height: standard height, elevated height  Home Equipment: no prior equipment  Transfer Assistance: Independent without use of device  Ambulation Assistance:Independent without use of device  ADL Assistance: independent with all ADL's  IADL Assistance:  shares all home making responsibilities with spouse  Active :        [x] Yes  [] No  Hand Dominance: [] Left  [x] Right  Current Employment: retired. 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    Hobbies: \"working with other people\"  Recent Falls: denies recent falls.     Examination   Vision:   Vision Gross Assessment: Impaired and Vision Corrective Device: wears glasses at all times  Hearing:   [place]  Perception:   WFL  Observation:   General Observation:  NG tube present  Posture:   good  Sensation:   denies numbness and tingling      ROM:   (B) Shoulder AROM WFL  Strength:   (B) UE strength grossly +3    Therapist Clinical Decision Making (Complexity): medium complexity  Clinical Presentation: evolving      Subjective  General: Pt supine in bed with NG tube present upon entry. Pt is a questionable historian with recent events but accurate with all biographical information. Spouse also present to assist with prior history. Pain: 4/10. Location: abdomen  Pain Interventions: pain medication in place prior to arrival        Activities of Daily Living  Basic Activities of Daily Living  Upper Extremity Bathing: setup assistance minimal assistance Comment: completed washing arms, chest, and abdomen sitting at [facility]. Assist provided to wash back. Lower Extremity Bathing: maximum assistance   Upper Extremity Dressing: Comment: gown change completed. Lower Extremity Dressing: maximum assistance Comment: Max A to thread breifs in sitting. Note: pt with increased difficulty flexing trunk for LB ADLs secondary to pain/ROM limitations from recent surgery. Toileting: dependent Comment: Max A for LB clothing management; voided bladder on BSC; Dependent for pericare in stance with x2 person assist. .    Instrumental Activities of Daily Living  No IADL completed on this date.     Functional Mobility  Bed Mobility  Supine to Sit: moderate assistance  Scooting: minimal assistance  Comments:  Transfers  Sit to stand transfer:minimal assistance, moderate assistance, Min A + mod A at EOB   Stand to sit transfer: minimal assistance  Stand pivot transfer: minimal assistance  Stand step transfer: minimal assistance  Comments: Pt completed stand/pivot from EOB to [facility] Min A; BSC to EOB min A; EOB to recliner CGA + Min A with cues for body mechanics and hand placement. Functional Mobility:  Sitting Balance: stand by assistance. Standing Balance: minimal assistance, Bilateral HHA with cues for hand placement. Pt stood for 2-3 min during transfers and ADL completion. No functional mobility completed on this date secondary to fatigue. Other Therapeutic Interventions    Functional Outcomes  AM-PAC Inpatient Daily Activity Raw Score: 12    Cognition  Overall Cognitive Status: Impaired  Arousal/Alterness: appropriate responses to stimuli  Following Commands: follows all commands without difficulty  Attention Span: appears intact  Memory: decreased recall of recent events, decreased short term memory  Safety Judgement: decreased awareness of need for assistance  Problem Solving: assistance required to generate solutions, assistance required to implement solutions, assistance required to identify errors made, assistance required to correct errors made  Insights: decreased awareness of deficits  Initiation: requires cues for some  Sequencing: requires cues for some  Orientation:    oriented to person, oriented to time, disoriented to place, and disoriented to situation  Command Following:   accurately follows one step commands     Education  Barriers To Learning: cognition  Patient Education: patient educated on goals, OT role and benefits, plan of care, transfer training, discharge recommendations  Learning Assessment:  patient will require reinforcement due to cognitive deficits    Assessment  Activity Tolerance: Pt limited by fatigue and decreased mentation.    Impairments Requiring Therapeutic Intervention: decreased functional mobility, decreased ADL status, decreased ROM, decreased strength, decreased safety awareness, decreased cognition, decreased endurance, decreased balance, increased pain  Prognosis: good  Clinical Assessment: Pt presenting below her functional baseline with the above deficits associated with SBO and s/p laparotomy and bowel resection. She is primarily limited by pain, fatigue, and decreased cognition (likely attributed to medication). Continued OT indicated in order to maximize safety and independence with all occupational pursuits.    Safety Interventions: patient left in chair, chair alarm in place, patient at risk for falls, nurse notified, and family/caregiver present    Plan  Frequency: 3-5 x/per week  Current Treatment Recommendations: strengthening, ROM, balance training, functional mobility training, transfer training, gait training, ADL/self-care training, IADL training, cognitive reorientation, and safety education    Goals  Patient Goals: Return to home   Short Term Goals:  Time Frame: Discharge  Patient will complete upper body ADL at stand by assistance   Patient will complete lower body ADL at minimal assistance   Patient will complete toileting at minimal assistance   Patient will complete grooming at stand by assistance, in stance at sink   Patient will complete functional transfers at stand by assistance     Therapy Session Time     Individual Group Co-treatment   Time In    1500   Time Out    1610   Minutes    70   Timed Code Treatment Minutes: 55 Minutes  Total Treatment Minutes:  70 minutes       Electronically Signed By: RUBÉN Sheppard OTR/L  [id number]

## 2022-11-22 LAB
ANION GAP SERPL CALCULATED.3IONS-SCNC: 10 MMOL/L (ref 3–16)
BASOPHILS ABSOLUTE: 0 K/UL (ref 0–0.2)
BASOPHILS RELATIVE PERCENT: 0 %
BUN BLDV-MCNC: 14 MG/DL (ref 7–20)
CALCIUM SERPL-MCNC: 8 MG/DL (ref 8.3–10.6)
CHLORIDE BLD-SCNC: 105 MMOL/L (ref 99–110)
CO2: 25 MMOL/L (ref 21–32)
CREAT SERPL-MCNC: <0.5 MG/DL (ref 0.6–1.2)
EOSINOPHILS ABSOLUTE: 0 K/UL (ref 0–0.6)
EOSINOPHILS RELATIVE PERCENT: 0 %
GFR SERPL CREATININE-BSD FRML MDRD: >60 ML/MIN/{1.73_M2}
GLUCOSE BLD-MCNC: 91 MG/DL (ref 70–99)
HCT VFR BLD CALC: 31 % (ref 36–48)
HEMOGLOBIN: 10.4 G/DL (ref 12–16)
LYMPHOCYTES ABSOLUTE: 1.4 K/UL (ref 1–5.1)
LYMPHOCYTES RELATIVE PERCENT: 16 %
MAGNESIUM: 1.9 MG/DL (ref 1.8–2.4)
MCH RBC QN AUTO: 31.9 PG (ref 26–34)
MCHC RBC AUTO-ENTMCNC: 33.6 G/DL (ref 31–36)
MCV RBC AUTO: 94.9 FL (ref 80–100)
MONOCYTES ABSOLUTE: 0.5 K/UL (ref 0–1.3)
MONOCYTES RELATIVE PERCENT: 6 %
NEUTROPHILS ABSOLUTE: 6.6 K/UL (ref 1.7–7.7)
NEUTROPHILS RELATIVE PERCENT: 78 %
PDW BLD-RTO: 13.7 % (ref 12.4–15.4)
PHOSPHORUS: 4 MG/DL (ref 2.5–4.9)
PLATELET # BLD: 250 K/UL (ref 135–450)
PMV BLD AUTO: 8.5 FL (ref 5–10.5)
POTASSIUM SERPL-SCNC: 3.1 MMOL/L (ref 3.5–5.1)
RBC # BLD: 3.27 M/UL (ref 4–5.2)
RBC # BLD: NORMAL 10*6/UL
SODIUM BLD-SCNC: 140 MMOL/L (ref 136–145)
WBC # BLD: 8.5 K/UL (ref 4–11)

## 2022-11-22 PROCEDURE — 2580000003 HC RX 258: Performed by: SURGERY

## 2022-11-22 PROCEDURE — C9113 INJ PANTOPRAZOLE SODIUM, VIA: HCPCS | Performed by: SURGERY

## 2022-11-22 PROCEDURE — 80048 BASIC METABOLIC PNL TOTAL CA: CPT

## 2022-11-22 PROCEDURE — 6370000000 HC RX 637 (ALT 250 FOR IP): Performed by: SURGERY

## 2022-11-22 PROCEDURE — 6360000002 HC RX W HCPCS: Performed by: SURGERY

## 2022-11-22 PROCEDURE — 94760 N-INVAS EAR/PLS OXIMETRY 1: CPT

## 2022-11-22 PROCEDURE — 83735 ASSAY OF MAGNESIUM: CPT

## 2022-11-22 PROCEDURE — 94761 N-INVAS EAR/PLS OXIMETRY MLT: CPT

## 2022-11-22 PROCEDURE — 84100 ASSAY OF PHOSPHORUS: CPT

## 2022-11-22 PROCEDURE — 1200000000 HC SEMI PRIVATE

## 2022-11-22 PROCEDURE — 36415 COLL VENOUS BLD VENIPUNCTURE: CPT

## 2022-11-22 PROCEDURE — 99024 POSTOP FOLLOW-UP VISIT: CPT | Performed by: SURGERY

## 2022-11-22 PROCEDURE — 85025 COMPLETE CBC W/AUTO DIFF WBC: CPT

## 2022-11-22 PROCEDURE — 6370000000 HC RX 637 (ALT 250 FOR IP): Performed by: PHYSICIAN ASSISTANT

## 2022-11-22 RX ORDER — POTASSIUM CHLORIDE 7.45 MG/ML
10 INJECTION INTRAVENOUS PRN
Status: DISCONTINUED | OUTPATIENT
Start: 2022-11-22 | End: 2022-11-28 | Stop reason: HOSPADM

## 2022-11-22 RX ORDER — POTASSIUM CHLORIDE 20 MEQ/1
40 TABLET, EXTENDED RELEASE ORAL PRN
Status: DISCONTINUED | OUTPATIENT
Start: 2022-11-22 | End: 2022-11-28 | Stop reason: HOSPADM

## 2022-11-22 RX ADMIN — ACETAMINOPHEN 1000 MG: 500 TABLET ORAL at 08:28

## 2022-11-22 RX ADMIN — PIPERACILLIN AND TAZOBACTAM 3375 MG: 3; .375 INJECTION, POWDER, FOR SOLUTION INTRAVENOUS at 02:04

## 2022-11-22 RX ADMIN — PIPERACILLIN AND TAZOBACTAM 3375 MG: 3; .375 INJECTION, POWDER, FOR SOLUTION INTRAVENOUS at 08:24

## 2022-11-22 RX ADMIN — MORPHINE SULFATE 2 MG: 2 INJECTION, SOLUTION INTRAMUSCULAR; INTRAVENOUS at 09:16

## 2022-11-22 RX ADMIN — ACETAMINOPHEN 1000 MG: 500 TABLET ORAL at 22:24

## 2022-11-22 RX ADMIN — POTASSIUM BICARBONATE 40 MEQ: 782 TABLET, EFFERVESCENT ORAL at 12:01

## 2022-11-22 RX ADMIN — PANTOPRAZOLE SODIUM 40 MG: 40 INJECTION, POWDER, FOR SOLUTION INTRAVENOUS at 08:16

## 2022-11-22 RX ADMIN — ENOXAPARIN SODIUM 40 MG: 100 INJECTION SUBCUTANEOUS at 08:28

## 2022-11-22 RX ADMIN — ACETAMINOPHEN 1000 MG: 500 TABLET ORAL at 14:24

## 2022-11-22 RX ADMIN — PIPERACILLIN AND TAZOBACTAM 3375 MG: 3; .375 INJECTION, POWDER, FOR SOLUTION INTRAVENOUS at 16:37

## 2022-11-22 RX ADMIN — MORPHINE SULFATE 4 MG: 4 INJECTION, SOLUTION INTRAMUSCULAR; INTRAVENOUS at 16:21

## 2022-11-22 RX ADMIN — Medication 10 ML: at 08:16

## 2022-11-22 ASSESSMENT — PAIN SCALES - GENERAL
PAINLEVEL_OUTOF10: 6
PAINLEVEL_OUTOF10: 5
PAINLEVEL_OUTOF10: 0
PAINLEVEL_OUTOF10: 4

## 2022-11-22 ASSESSMENT — PAIN DESCRIPTION - LOCATION
LOCATION: ABDOMEN

## 2022-11-22 ASSESSMENT — PAIN DESCRIPTION - DESCRIPTORS
DESCRIPTORS: ACHING
DESCRIPTORS: ACHING

## 2022-11-22 ASSESSMENT — PAIN DESCRIPTION - ORIENTATION
ORIENTATION: LEFT
ORIENTATION: LEFT;MID
ORIENTATION: ANTERIOR;MID

## 2022-11-22 NOTE — PROGRESS NOTES
[facility] PROGRESS NOTE    11/22/2022 8:02 AM        Name: Tiffany Vazquez . Admitted: 11/14/2022  Primary Care Provider: Luis Balderas MD (Tel: [phone])      Subjective:  . Seen this am with  at bedside  Asking for NG to be removed which I did. Reports that she has been having BM's  No reports of pain while still in the bed.       Reviewed interval ancillary notes    Current Medications  dextrose 5 % solution, Continuous  acetaminophen (TYLENOL) tablet 650 mg, Q4H PRN  acetaminophen (TYLENOL) tablet 1,000 mg, 3 times per day  sodium chloride flush 0.9 % injection 5-40 mL, 2 times per day  sodium chloride flush 0.9 % injection 5-40 mL, PRN  0.9 % sodium chloride infusion, PRN  enoxaparin (LOVENOX) injection 40 mg, Daily  ondansetron (ZOFRAN-ODT) disintegrating tablet 4 mg, Q8H PRN   Or  ondansetron (ZOFRAN) injection 4 mg, Q6H PRN  phenol 1.4 % mouth spray 1 spray, Q2H PRN  morphine (PF) injection 2 mg, Q2H PRN   Or  morphine injection 4 mg, Q2H PRN  oxyCODONE (ROXICODONE) immediate release tablet 5 mg, Q4H PRN   Or  oxyCODONE (ROXICODONE) immediate release tablet 10 mg, Q4H PRN  pantoprazole (PROTONIX) injection 40 mg, Daily  piperacillin-tazobactam (ZOSYN) 3,375 mg in dextrose 5 % 50 mL IVPB extended infusion (mini-bag), Q8H  diazePAM (VALIUM) injection 2.5 mg, Q4H PRN  sodium chloride flush 0.9 % injection 5-40 mL, 2 times per day  sodium chloride flush 0.9 % injection 5-40 mL, PRN  acetaminophen (TYLENOL) suppository 650 mg, Q6H PRN  morphine injection 4 mg, Q3H PRN        Objective:  BP (!) 163/68   Pulse 62   Temp 97.7 °F (36.5 °C) (Oral)   Resp 18   Ht 5' 3\" (1.6 m)   Wt 127 lb 8 oz (57.8 kg)   SpO2 95%   BMI 22.59 kg/m²     Intake/Output Summary (Last 24 hours) at 11/22/2022 0802  Last data filed at 11/22/2022 0406  Gross per 24 hour   Intake --   Output 770 ml   Net -770 ml      Wt Readings from Last 3 Encounters:   11/22/22 127 lb 8 oz (57.8 kg)   07/29/22 119 lb 3.2 oz (54.1 kg)   06/08/22 116 lb 9.6 oz (52.9 kg)       General appearance:  Appears comfortable, looks frail , alert and pleasant   Eyes: Sclera clear. Pupils equal.  ENT: Moist oral mucosa. Trachea midline, no adenopathy. Cardiovascular: Regular rhythm, normal S1, S2. No murmur. No edema in lower extremities  Respiratory: Not using accessory muscles. Good inspiratory effort. Clear to auscultation bilaterally, no wheeze or crackles. GI: Abdomen soft and flat with active bowel sounds. Abd dressing is clean dry intact. JUNIOR with serous drainage. Musculoskeletal: No cyanosis in digits, neck supple  Neurology: CN 2-12 grossly intact. No speech or motor deficits  Psych: Normal affect. Alert and oriented in time, place and person  Skin: Warm, dry, normal turgor    Labs and Tests:  CBC:   Recent Labs     11/20/22  0615 11/21/22  0638 11/22/22  0453   WBC 10.7 10.2 8.5   HGB 10.3* 9.6* 10.4*    240 250     BMP:    Recent Labs     11/20/22  0615 11/21/22  0638 11/22/22  0453   * 146* 140   K 4.1 3.7 3.1*   * 112* 105   CO2 26 26 25   BUN 20 19 14   CREATININE 0.6 0.5* <0.5*   GLUCOSE 125* 130* 91     Hepatic:   Recent Labs     11/20/22  0615   AST 20   ALT 12   BILITOT 0.6   ALKPHOS 74     11/18/22  xray:  Mechanical obstruction at the distal 3rd of the small bowel. Concomitant   small bowel ileus. RECOMMENDATION:   An additional delay radiograph of lower abdomen and pelvis at 24 hours from   this exam 2 determine the exact level in the distal 3rd of the small bowel   where there is an obstruction. Problem List  Principal Problem:    SBO (small bowel obstruction) (HCC)  Resolved Problems:    * No resolved hospital problems. *       Assessment & Plan:   SBO:  POD # 3 from Exploratory Lap with ROBERTO with small bowel and cecum resection,  NG removed, ambulation encouraged. Started on clears.   Surgery is following   Hypernatremia: resolved   Low K: will replace orally   Metabolic Encephalopathy: resolved   Elevated blood pressure noted:  decrease IV fluids to 50 cc per hour and stop if she is tolerating clears. She was not taking any prescription meds prior to admission       Diet: ADULT DIET;  Clear Liquid  Code:Full Code  DVT PPX      Janna January, WIL - CNP   11/22/2022 8:02 AM

## 2022-11-22 NOTE — PLAN OF CARE
Problem: ABCDS Injury Assessment  Goal: Absence of physical injury  Outcome: Progressing     Problem: Pain  Goal: Verbalizes/displays adequate comfort level or baseline comfort level  Outcome: Progressing     Problem: Skin/Tissue Integrity  Goal: Absence of new skin breakdown  Description: 1. Monitor for areas of redness and/or skin breakdown  2. Assess vascular access sites hourly  3. Every 4-6 hours minimum:  Change oxygen saturation probe site  4. Every 4-6 hours:  If on nasal continuous positive airway pressure, respiratory therapy assess nares and determine need for appliance change or resting period.   Outcome: Progressing     Problem: Safety - Adult  Goal: Free from fall injury  Outcome: Progressing     Problem: Discharge Planning  Goal: Discharge to home or other facility with appropriate resources  Outcome: Progressing     Problem: Nutrition Deficit:  Goal: Optimize nutritional status  Outcome: Progressing

## 2022-11-22 NOTE — PROGRESS NOTES
[facility] Laparoscopic [facility]  Progress Note    Pt Name: Abhilash Chavez  MRN: [id number]  [place]: 1940  Date of Evaluation: 2022    Subjective:    No acute events overnight  Pain controlled  No nausea with NG, frustrated with its presence though  Passing stool  Resting in bed at this time    Postoperative Day #3    Vital Signs:  Patient Vitals for the past 24 hrs:   BP Temp Temp src Pulse Resp SpO2 Height Weight   22 0946 -- -- -- -- 14 -- -- --   22 0813 (!) 162/86 98 °F (36.7 °C) Axillary -- 18 97 % -- --   22 0641 -- -- -- -- -- -- -- 127 lb 8 oz (57.8 kg)   22 0402 (!) 163/68 97.7 °F (36.5 °C) Oral 62 18 95 % -- --   22 2338 (!) 167/75 98 °F (36.7 °C) Axillary 74 18 100 % -- --   22 2020 (!) 155/73 97.7 °F (36.5 °C) Axillary 63 18 94 % -- --   22 1645 (!) 159/72 98 °F (36.7 °C) Oral 64 -- 95 % -- --   22 1225 -- -- -- -- -- -- 5' 3\" (1.6 m) --   22 1059 (!) 157/69 97.1 °F (36.2 °C) Axillary 65 18 95 % -- --      TEMPERATURE HISTORY 24H: Temp (24hrs), Av.8 °F (36.6 °C), Min:97.1 °F (36.2 °C), Max:98 °F (36.7 °C)    BLOOD PRESSURE HISTORY: Systolic (81AWP), HNM:153 , Min:132 , UIF:683    Diastolic (88ZMU), FLL:76, Min:68, Max:86      Intake/Output:    I/O last 3 completed shifts: In: 5911.6 [I.V.:5911.6]  Out: 1295 [Urine:550; Emesis/NG output:700; Drains:45]  No intake/output data recorded.   Drain/tube Output:    Closed/Suction Drain Superior Abdomen Bulb-Output (ml): 20 ml  Negative Pressure Wound Therapy Abdomen Medial-Output (ml): 0 ml      Physical Exam:  General: awake, alert, no acute distress, confused at baseline  Abdomen: soft, non-distended, appropriate incisional tenderness to palpation, JOANA replaced, incision clean dry and intact, JUNIOR serosanguineous    Labs:  CBC:    Recent Labs     22  0615 22  0638 22  0453   WBC 10.7 10.2 8.5   HGB 10.3* 9.6* 10.4*   HCT 30.8* 28.2* 31.0*    240 250     BMP:    Recent Labs     11/20/22  0615 11/21/22  0638 11/22/22  0453   * 146* 140   K 4.1 3.7 3.1*   * 112* 105   CO2 26 26 25   BUN 20 19 14   CREATININE 0.6 0.5* <0.5*   GLUCOSE 125* 130* 91     Hepatic:   Recent Labs     11/20/22  0615   AST 20   ALT 12   BILITOT 0.6   ALKPHOS 74     Amylase: No results for input(s): AMYLASE in the last 72 hours. Lipase:   No results for input(s): LIPASE in the last 72 hours. Mag:      Recent Labs     11/20/22  0615 11/21/22  0638 11/22/22  0453   MG 2.20 2.10 1.90      Phos:     Recent Labs     11/20/22  0615 11/21/22  0638 11/22/22  0453   PHOS 2.7 2.1* 4.0      Coags:   Recent Labs     11/20/22 0615   INR 1.06   APTT 32.4       Cultures:  Relevant cultures documented under results section     Pathology:   Pathology results pending     Imaging:  I have personally reviewed the following films:    XR ABDOMEN (2 VIEWS)    Result Date: 11/18/2022  EXAMINATION: TWO XRAY VIEWS OF THE ABDOMEN 11/18/2022 10:45 am COMPARISON: None. HISTORY: ORDERING SYSTEM PROVIDED HISTORY: F/u SBO TECHNOLOGIST PROVIDED HISTORY: Reason for exam:->F/u SBO Reason for Exam: abdominal pain FINDINGS: This study is done at 48 hours after initiation of the small bowel opacification procedure peer there is an NG tube looped/coiled in the proximal stomach and the tip of this 2 is in the mid body of the stomach. The stomach is nondistended and contains no positive contrast material. There is water-soluble contrast in the slightly dilated proximal small bowel. The maximal opacified small bowel diameter is 3.8 cm. There is no opacification of the intestinal tract distal to the middle 3rd of the small bowel. There is no large bowel dilatation. Mechanical obstruction at the distal 3rd of the small bowel. Concomitant small bowel ileus.  RECOMMENDATION: An additional delay radiograph of lower abdomen and pelvis at 24 hours from this exam 2 determine the exact level in the distal 3rd of the small bowel where there is an obstruction. Scheduled Meds:   acetaminophen  1,000 mg Oral 3 times per day    sodium chloride flush  5-40 mL IntraVENous 2 times per day    enoxaparin  40 mg SubCUTAneous Daily    pantoprazole  40 mg IntraVENous Daily    piperacillin-tazobactam  3,375 mg IntraVENous Q8H    sodium chloride flush  5-40 mL IntraVENous 2 times per day     Continuous Infusions:   dextrose 100 mL/hr at 11/21/22 1409    sodium chloride 5 mL/hr at 11/21/22 0110     PRN Meds:.potassium chloride **OR** potassium alternative oral replacement **OR** potassium chloride, acetaminophen, sodium chloride flush, sodium chloride, ondansetron **OR** ondansetron, phenol, morphine **OR** morphine, oxyCODONE **OR** oxyCODONE, diazePAM, sodium chloride flush, [DISCONTINUED] acetaminophen **OR** acetaminophen, [DISCONTINUED] morphine **OR** morphine      Assessment:  Patient Active Problem List   Diagnosis    Age-related osteoporosis without current pathological fracture    White coat syndrome without hypertension    SBO (small bowel obstruction) ([facility] [address]     Ms. Brenda Jeffery is a 79 y.o. female who presents with   OR Date 11/19/2022, exploratory laparotomy, lysis of adhesions, small bowel and cecum resection with anastomosis for small bowel obstruction    Plan:  1. Pain controlled, minimize narcotics  2. Monitor bowel function, passing stool  3. Remove NG, advance to clears  4. IVF, monitor and replace electrolytes as needed  5. Increase activity as able, PT/OT  6. Antibiotics  7. Defer management of remainder of medical comorbidities to primary and consulting teams    Ki Bey MD, FACS  11/22/2022  10:20 AM

## 2022-11-22 NOTE — PROGRESS NOTES
PT had 2 small bowl movements overnight and 1 medium this AM.  PT was saying \"let me die\" while getting cleaned up. Pt was acetaminophen 1000mg and 2 mg of morphine prior to be cleaned up.  now at bedside. Will continue to monitor for changes.

## 2022-11-22 NOTE — PROGRESS NOTES
Incentive Spirometry education and demonstration completed by Respiratory Therapy Yes      Response to education: Good     Teaching Time: 5 minutes    Minimum Predicted Vital Capacity - 524 mL. Patient's Actual Vital Capacity - 400 mL.  Turning over to Nursing for routine follow-up No.    Comments: N/A    Electronically signed by Owen Huynh RCP on 11/22/2022 at 4:39 PM

## 2022-11-23 LAB
ANION GAP SERPL CALCULATED.3IONS-SCNC: 7 MMOL/L (ref 3–16)
BASOPHILS ABSOLUTE: 0 K/UL (ref 0–0.2)
BASOPHILS RELATIVE PERCENT: 0.3 %
BUN BLDV-MCNC: 11 MG/DL (ref 7–20)
CALCIUM SERPL-MCNC: 7.8 MG/DL (ref 8.3–10.6)
CHLORIDE BLD-SCNC: 104 MMOL/L (ref 99–110)
CO2: 27 MMOL/L (ref 21–32)
CREAT SERPL-MCNC: <0.5 MG/DL (ref 0.6–1.2)
EOSINOPHILS ABSOLUTE: 0.1 K/UL (ref 0–0.6)
EOSINOPHILS RELATIVE PERCENT: 1.4 %
GFR SERPL CREATININE-BSD FRML MDRD: >60 ML/MIN/{1.73_M2}
GLUCOSE BLD-MCNC: 92 MG/DL (ref 70–99)
HCT VFR BLD CALC: 29.6 % (ref 36–48)
HEMOGLOBIN: 10.2 G/DL (ref 12–16)
LYMPHOCYTES ABSOLUTE: 0.8 K/UL (ref 1–5.1)
LYMPHOCYTES RELATIVE PERCENT: 8.7 %
MCH RBC QN AUTO: 31.8 PG (ref 26–34)
MCHC RBC AUTO-ENTMCNC: 34.4 G/DL (ref 31–36)
MCV RBC AUTO: 92.5 FL (ref 80–100)
MONOCYTES ABSOLUTE: 0.6 K/UL (ref 0–1.3)
MONOCYTES RELATIVE PERCENT: 6.5 %
NEUTROPHILS ABSOLUTE: 8.1 K/UL (ref 1.7–7.7)
NEUTROPHILS RELATIVE PERCENT: 83.1 %
PDW BLD-RTO: 13.1 % (ref 12.4–15.4)
PLATELET # BLD: 282 K/UL (ref 135–450)
PMV BLD AUTO: 8.4 FL (ref 5–10.5)
POTASSIUM SERPL-SCNC: 3.1 MMOL/L (ref 3.5–5.1)
RBC # BLD: 3.2 M/UL (ref 4–5.2)
SODIUM BLD-SCNC: 138 MMOL/L (ref 136–145)
WBC # BLD: 9.7 K/UL (ref 4–11)

## 2022-11-23 PROCEDURE — 2580000003 HC RX 258: Performed by: SURGERY

## 2022-11-23 PROCEDURE — 85025 COMPLETE CBC W/AUTO DIFF WBC: CPT

## 2022-11-23 PROCEDURE — 97535 SELF CARE MNGMENT TRAINING: CPT

## 2022-11-23 PROCEDURE — 97530 THERAPEUTIC ACTIVITIES: CPT

## 2022-11-23 PROCEDURE — 6370000000 HC RX 637 (ALT 250 FOR IP): Performed by: PHYSICIAN ASSISTANT

## 2022-11-23 PROCEDURE — 1200000000 HC SEMI PRIVATE

## 2022-11-23 PROCEDURE — 99024 POSTOP FOLLOW-UP VISIT: CPT | Performed by: SURGERY

## 2022-11-23 PROCEDURE — 6370000000 HC RX 637 (ALT 250 FOR IP): Performed by: SURGERY

## 2022-11-23 PROCEDURE — 36415 COLL VENOUS BLD VENIPUNCTURE: CPT

## 2022-11-23 PROCEDURE — 6360000002 HC RX W HCPCS: Performed by: SURGERY

## 2022-11-23 PROCEDURE — C9113 INJ PANTOPRAZOLE SODIUM, VIA: HCPCS | Performed by: SURGERY

## 2022-11-23 PROCEDURE — 97116 GAIT TRAINING THERAPY: CPT

## 2022-11-23 PROCEDURE — 6370000000 HC RX 637 (ALT 250 FOR IP): Performed by: NURSE PRACTITIONER

## 2022-11-23 PROCEDURE — 80048 BASIC METABOLIC PNL TOTAL CA: CPT

## 2022-11-23 RX ORDER — POTASSIUM BICARBONATE 25 MEQ/1
50 TABLET, EFFERVESCENT ORAL DAILY
Status: DISCONTINUED | OUTPATIENT
Start: 2022-11-23 | End: 2022-11-24

## 2022-11-23 RX ADMIN — OXYCODONE 5 MG: 5 TABLET ORAL at 23:23

## 2022-11-23 RX ADMIN — OXYCODONE 5 MG: 5 TABLET ORAL at 08:29

## 2022-11-23 RX ADMIN — Medication 10 ML: at 23:24

## 2022-11-23 RX ADMIN — PIPERACILLIN AND TAZOBACTAM 3375 MG: 3; .375 INJECTION, POWDER, FOR SOLUTION INTRAVENOUS at 10:13

## 2022-11-23 RX ADMIN — POTASSIUM BICARBONATE 50 MEQ: 977.5 TABLET, EFFERVESCENT ORAL at 10:04

## 2022-11-23 RX ADMIN — Medication 10 ML: at 10:30

## 2022-11-23 RX ADMIN — PANTOPRAZOLE SODIUM 40 MG: 40 INJECTION, POWDER, FOR SOLUTION INTRAVENOUS at 09:47

## 2022-11-23 RX ADMIN — ACETAMINOPHEN 1000 MG: 500 TABLET ORAL at 23:23

## 2022-11-23 RX ADMIN — OXYCODONE 5 MG: 5 TABLET ORAL at 16:47

## 2022-11-23 RX ADMIN — ACETAMINOPHEN 1000 MG: 500 TABLET ORAL at 06:04

## 2022-11-23 RX ADMIN — PIPERACILLIN AND TAZOBACTAM 3375 MG: 3; .375 INJECTION, POWDER, FOR SOLUTION INTRAVENOUS at 23:22

## 2022-11-23 RX ADMIN — PIPERACILLIN AND TAZOBACTAM 3375 MG: 3; .375 INJECTION, POWDER, FOR SOLUTION INTRAVENOUS at 01:58

## 2022-11-23 RX ADMIN — ACETAMINOPHEN 1000 MG: 500 TABLET ORAL at 14:54

## 2022-11-23 RX ADMIN — PIPERACILLIN AND TAZOBACTAM 3375 MG: 3; .375 INJECTION, POWDER, FOR SOLUTION INTRAVENOUS at 17:09

## 2022-11-23 RX ADMIN — OXYCODONE 5 MG: 5 TABLET ORAL at 03:23

## 2022-11-23 RX ADMIN — ENOXAPARIN SODIUM 40 MG: 100 INJECTION SUBCUTANEOUS at 10:22

## 2022-11-23 RX ADMIN — Medication 10 ML: at 09:47

## 2022-11-23 ASSESSMENT — PAIN SCALES - GENERAL
PAINLEVEL_OUTOF10: 4
PAINLEVEL_OUTOF10: 0
PAINLEVEL_OUTOF10: 4
PAINLEVEL_OUTOF10: 4
PAINLEVEL_OUTOF10: 0
PAINLEVEL_OUTOF10: 4

## 2022-11-23 ASSESSMENT — PAIN DESCRIPTION - DESCRIPTORS
DESCRIPTORS: ACHING;DISCOMFORT

## 2022-11-23 ASSESSMENT — PAIN DESCRIPTION - LOCATION
LOCATION: ABDOMEN

## 2022-11-23 ASSESSMENT — PAIN DESCRIPTION - ORIENTATION
ORIENTATION: LOWER

## 2022-11-23 NOTE — PROGRESS NOTES
Incentive Spirometry education and demonstration completed by Respiratory Therapy Yes      Response to education: Fair     Teaching Time: 5 minutes    Minimum Predicted Vital Capacity - 524 mL. Patient's Actual Vital Capacity - 400 mL.  Turning over to Nursing for routine follow-up No.    Comments: pt. Requires more education on IS    Electronically signed by Author Eva RCP on 11/22/2022 at 11:46 PM

## 2022-11-23 NOTE — PROGRESS NOTES
PM Assessment completed. BP (!) 147/73   Pulse 63   Temp 97.5 °F (36.4 °C) (Oral)   Resp 16   Ht 5' 3\" (1.6 m)   Wt 127 lb 8 oz (57.8 kg)   SpO2 96%   BMI 22.59 kg/m²     Alert and oriented to name and place. Denies pain at this time. Midline dressing with JOANA wound vac in place and functioning with green light. JUNIOR drain in place with suction no output. Calls appropriately. Plan of care, education and safety measures reviewed and mutually agreed upon with the patient. Needed items including call light in reach and exit alarm in place and camera in room.       Electronically signed by Liv Quiros RN on 11/22/2022 at 7:55 PM

## 2022-11-23 NOTE — PROGRESS NOTES
Shift assessment completed. Routine vitals taken. Scheduled medications given. Patient is awake, alert and oriented. Respirations are easy and unlabored. Patient does not appear to be in distress, resting comfortably at this time. Call light within reach. Falls precautions in place.

## 2022-11-23 NOTE — PROGRESS NOTES
Nutrition Note    RECOMMENDATIONS  PO Diet: clear liquids; advance as tolerated to low fiber diet  ONS: start Ensure clear BID  Nutrition Support: initiate TPN if unable to advance beyond clear liquids. NUTRITION ASSESSMENT   Pt is nutritionally compromised AEB NPO/clear liquid status x 8 days. Pt had NG tube removed yesterday & is tolerating clear liquid diet. +BM on 11/22. Receives D5 @ 50 ml/hr, providing 204 kcal/day from dextrose. Recommend diet advancement as tolerated or start nutrition support. Nutrition Related Findings: Na, Mg & phos WNL; k+ 3.1; no edema noted; LBM 11/22  Wounds: Surgical Incision  Nutrition Education:  Education not indicated   Nutrition Goals: Initiate PO diet, Initiate nutrition support, by next RD assessment     MALNUTRITION ASSESSMENT   Acute Illness  Malnutrition Status: At risk for malnutrition (Comment)    NUTRITION DIAGNOSIS   Inadequate oral intake related to altered GI function as evidenced by NPO or clear liquid status due to medical condition      CURRENT NUTRITION THERAPIES  ADULT DIET; Clear Liquid     PO Intake: Unable to assess (tolerating clears per RN)   PO Supplement Intake:None Ordered    ANTHROPOMETRICS  Current Height: 5' 3\" (160 cm)  Current Weight: 127 lb 3.2 oz (57.7 kg)    Admission weight: 118 lb (53.5 kg)  Ideal Body Weight (IBW): 115 lbs  (52 kg)        BMI: 22.5      COMPARATIVE STANDARDS  Energy (kcal):  2200-8995     Protein (g):         Fluid (mL/day):  2958-8150    The patient will be monitored per nutrition standards of care. Consult dietitian if additional nutrition interventions are needed prior to RD reassessment.      Jessica Fair RD, LD    Contact: [phone]

## 2022-11-23 NOTE — PROGRESS NOTES
[facility]   Phone: [phone]    Occupational Therapy    [] Initial Evaluation            [x] Daily Treatment Note         [] Discharge Summary      Patient: Sherrell Mcmullen   : 1940   MRN: [id number]   Date of Service:  2022    Admitting Diagnosis:  SBO (small bowel obstruction) ([facility] [address]  Current Admission Summary: Sherrell Mcmullen is a 79 y.o. female who presented to ED for evaluation of left sided abdominal pain which began around 11 am.  She reports associated nausea. She denies vomiting, diarrhea, constipation. She denies fever, chest pain, shortness of breath, cough, edema, urinary symptoms. She has a history of total hysterectomy. She denies history of any other abdominal surgeries. Reviewed labs and imaging performed in ED. CT concerning for partial SBO. General surgery consulted in ED and recommended pain control and NGT and will see patient tomorrow. Lactic acid slightly elevated but improved with IVF 2.5 > 2.1. Otherwise labs unremarkable. S/p--EXPLORATORY LAPAROTOMY, LYSIS OF ADHESIONS, POSSIBLE BOWEL RESECTION on     Past Medical History:  has a past medical history of Age-related osteoporosis without current pathological fracture, Arthritis, and Osteoporosis. Past Surgical History:  has a past surgical history that includes other surgical history (); Hysterectomy (); Vein Surgery (Right); Colonoscopy; eye surgery (Right); skin biopsy; and laparotomy (N/A, 2022). Discharge Recommendations: Sherrell Mcmullen scored a 16/24 on the AM-PAC ADL Inpatient form. Current research shows that an AM-PAC score of 17 or less is typically not associated with a discharge to the patient's home setting. Based on the patient's AM-PAC score and their current ADL deficits, it is recommended that the patient have 5-7 sessions per week of Occupational Therapy at d/c to increase the patient's independence.   At this time, this patient demonstrates complex nursing, medical, and rehabilitative needs, and would benefit from intensive rehabilitation services upon discharge from the Inpatient setting. This patient demonstrates the ability to participate in and benefit from an intensive therapy program with a coordinated interdisciplinary team approach to foster frequent, structured, and documented communication among disciplines, who will work together to establish, prioritize, and achieve treatment goals. Please see assessment section for further patient specific details. If patient discharges prior to next session this note will serve as a discharge summary. Please see below for the latest assessment towards goals. DME Required For Discharge: DME to be determined at next level of care, DME to be determined pending patient progress    Precautions/Restrictions: high fall risk  Weight Bearing Restrictions: no restrictions--currently NPO with sips of water with meds    [] Right Upper Extremity  [] Left Upper Extremity [] Right Lower Extremity  [] Left Lower Extremity     Required Braces/Orthotics: no braces required   [] Right  [] Left  Positional Restrictions:no positional restrictions    Pre-Admission Information   Lives With: spouse    Type of Home: house  Home Layout: one level  Home Access: level entry  Bathroom Layout: tub/shower unit  Bathroom Equipment:  no prior equipment  Toilet Height: standard height, elevated height  Home Equipment: no prior equipment  Transfer Assistance: Independent without use of device  Ambulation Assistance:Independent without use of device  ADL Assistance: independent with all ADL's  IADL Assistance:  shares all home making responsibilities with spouse  Active :        [x] Yes  [] No  Hand Dominance: [] Left  [x] Right  Current Employment: retired. 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    Hobbies: \"working with other people\"  Recent Falls: denies recent falls.       Subjective: Pt supine in bed upon entry, pleasant and agreeable to therapy session. Pt wanting to call  \"I haven't seen him in 2 days. \" At EOB this writer dialed phone for pt and pt spoke with  briefly as did this writer (pt's  stating he was visiting with pt last night). General: Pt supine to sit Min A. Pt scoot to EOB SBA. Pt sit EOB SBA/CGA. Pt sit to stand Min A. Pt CGA with rw to ambulated to/from bathroom ~15ft (x2 trials). Pt toilet transfer Min A with grab bar, pt Min A for toileting to pull up brief. Pt provided hygiene care seated on toilet. Pt threaded LLE into new brief, required assist to thread RLE into brief and total assist to don new  socks (pt pulled brief up in stance CGA). Pt in stance at sink SBA/CGA ~5min for hand washing/comb hair/oral care. Pt with 2nd toilet transfer Min A, pt didn't void and required assist for brief down. Pt back at recliner, stand to sit CGA. Pt served breakfast, call light in reach and chair alarm on. Pt's  present at [address] [place] [address]. Pain: 3/10. Location: abdomen  Pain Interventions: RN notified and RN notified of patient request for pain medication        Activities of Daily Living  Basic Activities of Daily Living  Grooming: stand by assistance contact guard assistance  Lower Extremity Dressing: maximum assistance Comment: Pt total assist with  socks and assist to thread RLE into brief. Toileting: minimal assistance. Instrumental Activities of Daily Living  No IADL completed on this date. Functional Mobility  Bed Mobility  Supine to Sit: minimal assistance  Scooting: stand by assistance  Comments:  Transfers  Sit to stand transfer:minimal assistance  Stand to sit transfer: contact guard assistance  Toilet transfer: minimal assistance  Toilet transfer equipment: grab bars  Functional Mobility:  Sitting Balance: stand by assistance. Standing Balance: stand by assistance, contact guard assistance.     Functional safety before returning home. Continue with POC. Safety Interventions: patient left in chair, chair alarm in place, patient at risk for falls, nurse notified, and family/caregiver present    Plan  Frequency: 3-5 x/per week  Current Treatment Recommendations: strengthening, ROM, balance training, functional mobility training, transfer training, gait training, ADL/self-care training, IADL training, cognitive reorientation, and safety education    Goals  Patient Goals: Return to home   Short Term Goals:  Time Frame: Discharge  Patient will complete upper body ADL at stand by assistance - goal not addressed 11/23  Patient will complete lower body ADL at minimal assistance - goal not met Max A 11/23  Patient will complete toileting at minimal assistance - goal met 11/13 UPDATE GOAL Pt will complete toielting at Mod I  Patient will complete grooming at stand by assistance, in stance at sink - SBA/CGA 11/23 continue goal  Patient will complete functional transfers at stand by assistance - Min A goal not met 11/23    Therapy Session Time     Individual Group Co-treatment   Time In    0806   Time Out    0859   Minutes    53   Timed Code Treatment Minutes: 53 Minutes  Total Treatment Minutes:  53 minutes       Electronically Signed By: Maye Tran, [address]   I have reviewed and agree with the above treatment note.  Mary Dean OTR/L [id number]

## 2022-11-23 NOTE — CARE COORDINATION
SW met with patient yesterday and provided SNF lists. Informed to make choices. Met with spouse and pt today who chose [facility] as their first choice. Called Nimesh in admissions there, [phone], who asked about pt's NG tube as they could not take here there if she has that. Confirmed w/RN that NG was removed yesterday. [facility] will start precert today for their facility. Discharge Plan:  [facility] SNF pending precert.     Electronically signed by DEREK Velázquez, LSW on 11/23/2022 at 11:56 AM

## 2022-11-23 NOTE — PROGRESS NOTES
[facility] PROGRESS NOTE    11/23/2022 7:58 AM        Name: Hillis Mortimer . Admitted: 11/14/2022  Primary Care Provider: Gwendolyn Croft MD (Tel: [phone])      Subjective:  . Seen this am with  at bedside  Pt was up in the chair after working with therapy   Appetite is fair.   No nausea or bloating     Reports that she has been having BM's       Reviewed interval ancillary notes    Current Medications  potassium chloride (KLOR-CON M) extended release tablet 40 mEq, PRN   Or  potassium bicarb-citric acid (EFFER-K) effervescent tablet 40 mEq, PRN   Or  potassium chloride 10 mEq/100 mL IVPB (Peripheral Line), PRN  dextrose 5 % solution, Continuous  acetaminophen (TYLENOL) tablet 650 mg, Q4H PRN  acetaminophen (TYLENOL) tablet 1,000 mg, 3 times per day  sodium chloride flush 0.9 % injection 5-40 mL, 2 times per day  sodium chloride flush 0.9 % injection 5-40 mL, PRN  0.9 % sodium chloride infusion, PRN  enoxaparin (LOVENOX) injection 40 mg, Daily  ondansetron (ZOFRAN-ODT) disintegrating tablet 4 mg, Q8H PRN   Or  ondansetron (ZOFRAN) injection 4 mg, Q6H PRN  phenol 1.4 % mouth spray 1 spray, Q2H PRN  morphine (PF) injection 2 mg, Q2H PRN   Or  morphine injection 4 mg, Q2H PRN  oxyCODONE (ROXICODONE) immediate release tablet 5 mg, Q4H PRN   Or  oxyCODONE (ROXICODONE) immediate release tablet 10 mg, Q4H PRN  pantoprazole (PROTONIX) injection 40 mg, Daily  piperacillin-tazobactam (ZOSYN) 3,375 mg in dextrose 5 % 50 mL IVPB extended infusion (mini-bag), Q8H  diazePAM (VALIUM) injection 2.5 mg, Q4H PRN  sodium chloride flush 0.9 % injection 5-40 mL, 2 times per day  sodium chloride flush 0.9 % injection 5-40 mL, PRN  acetaminophen (TYLENOL) suppository 650 mg, Q6H PRN  morphine injection 4 mg, Q3H PRN      Objective:  BP (!) 157/83   Pulse 65   Temp 98 °F (36.7 °C) (Oral)   Resp 18   Ht 5' 3\" (1.6 m)   Wt 127 lb 3.2 oz (57.7 kg)   SpO2 97%   BMI 22.53 kg/m²     Intake/Output Summary (Last 24 hours) at 11/23/2022 0758  Last data filed at 11/23/2022 5909  Gross per 24 hour   Intake --   Output 522 ml   Net -522 ml        Wt Readings from Last 3 Encounters:   11/23/22 127 lb 3.2 oz (57.7 kg)   07/29/22 119 lb 3.2 oz (54.1 kg)   06/08/22 116 lb 9.6 oz (52.9 kg)       General appearance:  Appears comfortable, looks frail , alert and pleasant, up in chair   Eyes: Sclera clear. Pupils equal.  ENT: Moist oral mucosa. Trachea midline, no adenopathy. Cardiovascular: Regular rhythm, normal S1, S2. No murmur. No edema in lower extremities  Respiratory: Not using accessory muscles. Good inspiratory effort. Clear to auscultation bilaterally, no wheeze or crackles. GI: Abdomen soft and flat with active bowel sounds. Abd dressing is clean dry intact. JUNIOR with serous drainage. Musculoskeletal: No cyanosis in digits, neck supple  Neurology: CN 2-12 grossly intact. No speech or motor deficits  Psych: Normal affect. Alert and oriented in time, place and person  Skin: Warm, dry, normal turgor    Labs and Tests:  CBC:   Recent Labs     11/21/22  0638 11/22/22 0453 11/23/22  0519   WBC 10.2 8.5 9.7   HGB 9.6* 10.4* 10.2*    250 282       BMP:    Recent Labs     11/21/22  0638 11/22/22 0453 11/23/22  0519   * 140 138   K 3.7 3.1* 3.1*   * 105 104   CO2 26 25 27   BUN 19 14 11   CREATININE 0.5* <0.5* <0.5*   GLUCOSE 130* 91 92       Hepatic:   No results for input(s): AST, ALT, ALB, BILITOT, ALKPHOS in the last 72 hours. 11/18/22  xray:  Mechanical obstruction at the distal 3rd of the small bowel. Concomitant   small bowel ileus. RECOMMENDATION:   An additional delay radiograph of lower abdomen and pelvis at 24 hours from   this exam 2 determine the exact level in the distal 3rd of the small bowel   where there is an obstruction.          Problem List  Principal Problem:    SBO (small bowel obstruction) [facility])  Resolved Problems:    * No resolved hospital problems. *       Assessment & Plan:   SBO:  POD # 4 from Exploratory Lap with ROBERTO with small bowel and cecum resection,  she is tolerating clears and working with therapy. Ambulation was encouraged. surgery is following   Hypernatremia: resolved   Low K: will replace orally   Metabolic Encephalopathy: resolved   Elevated blood pressure noted:  will stop IV fluids and monitor. She was not taking any prescription meds prior to admission     May need ECF at d/c. She is not a candidate for ARU due to her [employer] insurance     Diet: ADULT DIET;  Clear Liquid  Code:Full Code  DVT PPX      Jaun Butter, APRN - CNP   11/23/2022 7:58 AM

## 2022-11-23 NOTE — PROGRESS NOTES
[facility]   Phone: [phone]    Physical Therapy    [] Initial Evaluation            [x] Daily Treatment Note         [] Discharge Summary      Patient: Isabella Che   : 1940   MRN: [id number]   Date of Service:  2022  Admitting Diagnosis: SBO (small bowel obstruction) ([address]  Current Admission Summary: Pt admitted with partial SBO. General surgery consulted. Conservative management with NG tube failed. S/p exploratory laparotomy with small bowel and ileal resection 22. Past Medical History:  has a past medical history of Age-related osteoporosis without current pathological fracture, Arthritis, and Osteoporosis. Past Surgical History:  has a past surgical history that includes other surgical history (); Hysterectomy (); Vein Surgery (Right); Colonoscopy; eye surgery (Right); skin biopsy; and laparotomy (N/A, 2022). Discharge Recommendations: Isabella Che scored a 16/24 on the AM-PAC short mobility form. Current research shows that an AM-PAC score of 17 or less is typically not associated with a discharge to the patient's home setting. Based on the patient's AM-PAC score and their current functional mobility deficits, it is recommended that the patient have 5-7 sessions per week of Physical Therapy at d/c to increase the patient's independence. At this time, this patient demonstrates complex nursing, medical, and rehabilitative needs, and would benefit from intensive rehabilitation services upon discharge from the Inpatient setting. This patient demonstrates the ability to participate in and benefit from an intensive therapy program with a coordinated interdisciplinary team approach to foster frequent, structured, and documented communication among disciplines, who will work together to establish, prioritize, and achieve treatment goals. Please see assessment section for further patient specific details.     If patient discharges prior to next session this note will serve as a discharge summary. Please see below for the latest assessment towards goals. DME Required For Discharge: DME to be determined at next level of care, DME to be determined pending patient progress  Precautions/Restrictions: high fall risk, modified diet  Weight Bearing Restrictions: no restrictions  [] Right Upper Extremity  [] Left Upper Extremity [] Right Lower Extremity  [] Left Lower Extremity     Required Braces/Orthotics: no braces required   [] Right  [] Left  Positional Restrictions:no positional restrictions    Pre-Admission Information   Lives With: spouse                  Type of Home: house  Home Layout: one level  Home Access: level entry  Bathroom Layout: tub/shower unit  Bathroom Equipment:  no prior equipment  Toilet Height: standard height, elevated height  Home Equipment: no prior equipment  Transfer Assistance: Independent without use of device  Ambulation Assistance:Independent without use of device  ADL Assistance: independent with all ADL's  IADL Assistance:  shares all home making responsibilities with spouse  Active :        [x] Yes                 [] No  Hand Dominance: [] Left                 [x] Right  Current Employment: retired. 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 Hobbies: \"working with other people\"  Recent Falls: denies recent falls. Examination   Vision:   Vision Gross Assessment: Impaired and Vision Corrective Device: wears glasses at all times  Hearing:   WFL  Posture:   Good  Sensation:   WFL  ROM:   (B) LE AROM WFL  Strength:   (B) LE strength grossly 3  Therapist Clinical Decision Making (Complexity): low complexity  Clinical Presentation: stable      Subjective  General: Pt supine in bed upon arrival. Agreeable to PT/OT. Spouse present at end of session. Pain: 4/10.   Location: abdomen  Pain Interventions: RN notified of patient request for pain medication and repositioned        Functional Mobility  Bed Mobility  Supine to Sit: minimal assistance  Scooting: contact guard assistance  Comments: verbal cues for sequence, able to to legs EOB yin required assist for trunk due to decreased core strength and increased abdominal pain  Transfers  Sit to stand transfer: minimal assistance  Stand to sit transfer: contact guard assistance  Toilet transfer: minimal assistance   Comments: Pt required increased time to perform all transfers, verbal cues for hand placement; 2x toilet transfers   Ambulation  Surface:level surface  Assistive Device: rolling walker  Assistance: contact guard assistance  Distance: 15' + 15'   Gait Mechanics: decreased gait speed, shuffling pattern with decreased step height   Comments:    Stair Mobility  Stair mobility not completed on this date. Comments:  Wheelchair Mobility:  No w/c mobility completed on this date. Comments:  Balance  Static Sitting Balance: fair (-): maintains balance at SBA with use of UE support  Dynamic Sitting Balance: fair (-): maintains balance at SBA with use of UE support  Static Standing Balance: poor (+): requires min (A) to maintain balance  Dynamic Standing Balance: poor (+): requires min (A) to maintain balance  Comments: Pt sat EOB at SBA to CGA for sitting balance; pt stood at sink ~ 5 minutes to wash hands, brush teeth, comb hair. Other Therapeutic Interventions  See OT notes for details- pt carried out toileting, pericare, and brief change.    Functional Outcomes  AM-PAC Inpatient Mobility Raw Score : 16              Cognition  Overall Cognitive Status: Impaired  Problem Solving: assistance required to generate solutions, assistance required to implement solutions, assistance required to identify errors made, assistance required to correct errors made  Insights: decreased awareness of deficits  Initiation: requires cues for some  Sequencing: requires cues for some  Orientation:    oriented to person, oriented to time, disoriented to place, and disoriented to situation  Command Following:   [facility]    Education  Barriers To Learning: cognition  Patient Education: patient educated on goals, PT role and benefits, plan of care, general safety, functional mobility training, orientation, family education, transfer training, discharge recommendations  Learning Assessment:  patient verbalizes understanding, would benefit from continued reinforcement, patient will require reinforcement due to cognitive deficits, spouse verbalizing understanding    Assessment  Activity Tolerance: Fair, limited by endurance and fatigue  Impairments Requiring Therapeutic Intervention: decreased functional mobility, decreased strength, decreased cognition, decreased endurance, decreased balance  Prognosis: good  Clinical Assessment: Pt showing improved activity tolerance on this date with ability to tolerate ambulation during session. Pt required Hanh for bed mobility and transfers and CGA for short distance ambulation with RW. Pt previously was independent without assistive device and is presenting well below baseline function. Pt required increased time for rest breaks and stated feeling overwhelmed by decline in function. Pt would continue to benefit from skilled PT services to improve independence and facilitate return to prior level of function.    Safety Interventions: patient left in chair, chair alarm in place, call light within reach, gait belt, patient at risk for falls, nurse notified, and family/caregiver present    Plan  Frequency: 3-5 x/per week  Current Treatment Recommendations: strengthening, balance training, functional mobility training, transfer training, gait training, endurance training, neuromuscular re-education, modalities, patient/caregiver education, home exercise program, safety education, equipment evaluation/education, and positioning    Goals  Patient Goals: pt did not state   Short Term Goals:  Time Frame: upon d/c  Short term goal 1: Pt will perform bed mobility with supervision  Short term goal 2: Pt will perform transfers with LRAD and SBA  Short term goal 3: Pt will ambulate 22' with LRAD and CGA   No goals met on this date.  11/23    Therapy Session Time      Individual Group Co-treatment   Time In     0806   Time Out     0859   Minutes     53     Timed Code Treatment Minutes:   53 minutes   Total Treatment Minutes:  53 minutes        Electronically Signed By: Carla Goff [place], DPT [id number]

## 2022-11-24 LAB
ANION GAP SERPL CALCULATED.3IONS-SCNC: 9 MMOL/L (ref 3–16)
BASOPHILS ABSOLUTE: 0.1 K/UL (ref 0–0.2)
BASOPHILS RELATIVE PERCENT: 0.6 %
BUN BLDV-MCNC: 12 MG/DL (ref 7–20)
CALCIUM SERPL-MCNC: 7.9 MG/DL (ref 8.3–10.6)
CHLORIDE BLD-SCNC: 100 MMOL/L (ref 99–110)
CO2: 23 MMOL/L (ref 21–32)
CREAT SERPL-MCNC: 0.6 MG/DL (ref 0.6–1.2)
EOSINOPHILS ABSOLUTE: 0.2 K/UL (ref 0–0.6)
EOSINOPHILS RELATIVE PERCENT: 1.3 %
GFR SERPL CREATININE-BSD FRML MDRD: >60 ML/MIN/{1.73_M2}
GLUCOSE BLD-MCNC: 77 MG/DL (ref 70–99)
HCT VFR BLD CALC: 29.5 % (ref 36–48)
HEMOGLOBIN: 9.9 G/DL (ref 12–16)
LYMPHOCYTES ABSOLUTE: 1.2 K/UL (ref 1–5.1)
LYMPHOCYTES RELATIVE PERCENT: 10.7 %
MCH RBC QN AUTO: 31.3 PG (ref 26–34)
MCHC RBC AUTO-ENTMCNC: 33.7 G/DL (ref 31–36)
MCV RBC AUTO: 93.1 FL (ref 80–100)
MONOCYTES ABSOLUTE: 0.7 K/UL (ref 0–1.3)
MONOCYTES RELATIVE PERCENT: 6.1 %
NEUTROPHILS ABSOLUTE: 9.2 K/UL (ref 1.7–7.7)
NEUTROPHILS RELATIVE PERCENT: 81.3 %
PDW BLD-RTO: 13.3 % (ref 12.4–15.4)
PLATELET # BLD: 323 K/UL (ref 135–450)
PMV BLD AUTO: 7.9 FL (ref 5–10.5)
POTASSIUM SERPL-SCNC: 3.2 MMOL/L (ref 3.5–5.1)
RBC # BLD: 3.17 M/UL (ref 4–5.2)
SODIUM BLD-SCNC: 132 MMOL/L (ref 136–145)
WBC # BLD: 11.3 K/UL (ref 4–11)

## 2022-11-24 PROCEDURE — 1200000000 HC SEMI PRIVATE

## 2022-11-24 PROCEDURE — 85025 COMPLETE CBC W/AUTO DIFF WBC: CPT

## 2022-11-24 PROCEDURE — 2580000003 HC RX 258: Performed by: SURGERY

## 2022-11-24 PROCEDURE — 6370000000 HC RX 637 (ALT 250 FOR IP): Performed by: PHYSICIAN ASSISTANT

## 2022-11-24 PROCEDURE — 6370000000 HC RX 637 (ALT 250 FOR IP): Performed by: NURSE PRACTITIONER

## 2022-11-24 PROCEDURE — 6370000000 HC RX 637 (ALT 250 FOR IP): Performed by: SURGERY

## 2022-11-24 PROCEDURE — 99024 POSTOP FOLLOW-UP VISIT: CPT | Performed by: SURGERY

## 2022-11-24 PROCEDURE — 6360000002 HC RX W HCPCS: Performed by: SURGERY

## 2022-11-24 PROCEDURE — 80048 BASIC METABOLIC PNL TOTAL CA: CPT

## 2022-11-24 PROCEDURE — 36415 COLL VENOUS BLD VENIPUNCTURE: CPT

## 2022-11-24 PROCEDURE — C9113 INJ PANTOPRAZOLE SODIUM, VIA: HCPCS | Performed by: SURGERY

## 2022-11-24 RX ORDER — POTASSIUM BICARBONATE 25 MEQ/1
50 TABLET, EFFERVESCENT ORAL 2 TIMES DAILY
Status: DISCONTINUED | OUTPATIENT
Start: 2022-11-24 | End: 2022-11-27

## 2022-11-24 RX ADMIN — PANTOPRAZOLE SODIUM 40 MG: 40 INJECTION, POWDER, FOR SOLUTION INTRAVENOUS at 08:02

## 2022-11-24 RX ADMIN — ENOXAPARIN SODIUM 40 MG: 100 INJECTION SUBCUTANEOUS at 08:02

## 2022-11-24 RX ADMIN — Medication 10 ML: at 21:00

## 2022-11-24 RX ADMIN — OXYCODONE 5 MG: 5 TABLET ORAL at 19:55

## 2022-11-24 RX ADMIN — ACETAMINOPHEN 1000 MG: 500 TABLET ORAL at 05:11

## 2022-11-24 RX ADMIN — PIPERACILLIN AND TAZOBACTAM 3375 MG: 3; .375 INJECTION, POWDER, FOR SOLUTION INTRAVENOUS at 08:10

## 2022-11-24 RX ADMIN — ACETAMINOPHEN 1000 MG: 500 TABLET ORAL at 23:30

## 2022-11-24 RX ADMIN — OXYCODONE 5 MG: 5 TABLET ORAL at 05:11

## 2022-11-24 RX ADMIN — Medication 10 ML: at 19:50

## 2022-11-24 RX ADMIN — POTASSIUM BICARBONATE 50 MEQ: 977.5 TABLET, EFFERVESCENT ORAL at 19:50

## 2022-11-24 RX ADMIN — Medication 10 ML: at 08:02

## 2022-11-24 RX ADMIN — POTASSIUM BICARBONATE 50 MEQ: 977.5 TABLET, EFFERVESCENT ORAL at 08:01

## 2022-11-24 RX ADMIN — ACETAMINOPHEN 1000 MG: 500 TABLET ORAL at 14:13

## 2022-11-24 ASSESSMENT — PAIN DESCRIPTION - LOCATION
LOCATION: ABDOMEN
LOCATION: ABDOMEN

## 2022-11-24 ASSESSMENT — PAIN SCALES - GENERAL
PAINLEVEL_OUTOF10: 7
PAINLEVEL_OUTOF10: 0
PAINLEVEL_OUTOF10: 4
PAINLEVEL_OUTOF10: 0
PAINLEVEL_OUTOF10: 0
PAINLEVEL_OUTOF10: 8

## 2022-11-24 ASSESSMENT — PAIN DESCRIPTION - DESCRIPTORS
DESCRIPTORS: DISCOMFORT
DESCRIPTORS: DISCOMFORT

## 2022-11-24 NOTE — PROGRESS NOTES
Shift assessment completed. Routine vitals obtained. Scheduled medications given. Patient is awake, alert and oriented to self and time, disoriented to place and situation. Respirations are easy and unlabored. Patient denies pain and does not appear to be in distress, resting comfortably at this time. Call light within reach. Fall precautions in place. No further needs expressed.

## 2022-11-24 NOTE — PROGRESS NOTES
Tiffany Vazquez is a 79 y.o. female patient.     Current Facility-Administered Medications   Medication Dose Route Frequency Provider Last Rate Last Admin    potassium bicarbonate (K-LYTE) disintegrating tablet 50 mEq  50 mEq Oral BID WIL Tsang CNP   50 mEq at 11/24/22 0801    potassium chloride (KLOR-CON M) extended release tablet 40 mEq  40 mEq Oral PRN Remington Sena MD        Or    potassium bicarb-citric acid (EFFER-K) effervescent tablet 40 mEq  40 mEq Oral PRN Remington Sena MD   40 mEq at 11/22/22 1201    Or    potassium chloride 10 mEq/100 mL IVPB (Peripheral Line)  10 mEq IntraVENous PRN Remington Sena MD        acetaminophen (TYLENOL) tablet 650 mg  650 mg Oral Q4H PRN Rachel Morales PA-C        acetaminophen (TYLENOL) tablet 1,000 mg  1,000 mg Oral 3 times per day Rachel Morales PA-C   1,000 mg at 11/24/22 0511    sodium chloride flush 0.9 % injection 5-40 mL  5-40 mL IntraVENous 2 times per day Remington Sena MD   10 mL at 11/24/22 0802    sodium chloride flush 0.9 % injection 5-40 mL  5-40 mL IntraVENous PRN Remington Sena MD        0.9 % sodium chloride infusion   IntraVENous PRN Remington Sena MD 5 mL/hr at 11/21/22 0110 New Bag at 11/21/22 0110    enoxaparin (LOVENOX) injection 40 mg  40 mg SubCUTAneous Daily Remington Sena MD   40 mg at 11/24/22 0802    ondansetron (ZOFRAN-ODT) disintegrating tablet 4 mg  4 mg Oral Q8H PRN Remington Sena MD        Or    ondansetron [facility]) injection 4 mg  4 mg IntraVENous Q6H PRN Remington Sena MD        phenol 1.4 % mouth spray 1 spray  1 spray Mouth/Throat Q2H PRN Remington Sena MD   1 spray at 11/19/22 1956    morphine (PF) injection 2 mg  2 mg IntraVENous Q2H PRN Remington Sena MD   2 mg at 11/22/22 4815    Or    morphine injection 4 mg  4 mg IntraVENous Q2H PRN Remington Sena MD   4 mg at 11/22/22 1621    oxyCODONE (ROXICODONE) immediate release tablet 5 mg  5 mg Oral Q4H PRN Remington Sena MD   5 mg at 11/24/22 6797    Or oxyCODONE (ROXICODONE) immediate release tablet 10 mg  10 mg Oral Q4H PRN Madonna Alejandra MD        pantoprazole (PROTONIX) injection 40 mg  40 mg IntraVENous Daily Madonna Alejandra MD   40 mg at 11/24/22 0802    piperacillin-tazobactam (ZOSYN) 3,375 mg in dextrose 5 % 50 mL IVPB extended infusion (mini-bag)  3,375 mg IntraVENous Q8H Madonna Alejandra MD 12.5 mL/hr at 11/24/22 0810 3,375 mg at 11/24/22 0810    diazePAM (VALIUM) injection 2.5 mg  2.5 mg IntraVENous Q4H PRN Madonna Alejandra MD   2.5 mg at 11/16/22 0656    sodium chloride flush 0.9 % injection 5-40 mL  5-40 mL IntraVENous 2 times per day Madonna Alejandra MD   10 mL at 11/24/22 0802    sodium chloride flush 0.9 % injection 5-40 mL  5-40 mL IntraVENous PRN Madonna Alejandra MD   10 mL at 11/15/22 0645    acetaminophen (TYLENOL) suppository 650 mg  650 mg Rectal Q6H PRN Madonna Alejandra MD        morphine injection 4 mg  4 mg IntraVENous Q3H PRN Madonna Alejandra MD   4 mg at 11/18/22 0803     No Known Allergies  Principal Problem:    SBO (small bowel obstruction) ([facility] [address]  Resolved Problems:    * No resolved hospital problems. *    Blood pressure 138/76, pulse 74, temperature 97.6 °F (36.4 °C), temperature source Oral, resp. rate 18, height 5' 3\" (1.6 m), weight 128 lb 14.4 oz (58.5 kg), SpO2 95 %. Subjective:  Symptoms:  Improved. Diet:  Adequate intake. Activity level: Returning to normal.    Pain:  She complains of pain that is mild. Objective:  General Appearance:  Comfortable. Vital signs: (most recent): Blood pressure 138/76, pulse 74, temperature 97.6 °F (36.4 °C), temperature source Oral, resp. rate 18, height 5' 3\" (1.6 m), weight 128 lb 14.4 oz (58.5 kg), SpO2 95 %. Output: Producing urine. Lungs:  Normal effort and normal respiratory rate. Abdomen: Abdomen is soft and non-distended. (Mild right-sided abdominal tenderness. Incision approximated without evidence of infection. ).     Neurological: Patient is alert and oriented to person, place and time. Skin:  Warm and dry. Assessment & Plan 69-year-old female who is postop day #5 status post lysis of adhesions and ileocolonic resection. Doing well overall. Tolerating full liquids and having bowel function. Will advance to general diet. Continue PT/OT. Anticipate transfer to [facility] when bed available and arrangements made.     Sy Lujan MD  11/24/2022

## 2022-11-24 NOTE — PROGRESS NOTES
PM Assessment completed. /75   Pulse 75   Temp 98 °F (36.7 °C) (Oral)   Resp 16   Ht 5' 3\" (1.6 m)   Wt 127 lb 3.2 oz (57.7 kg)   SpO2 93%   BMI 22.53 kg/m²     Alert and oriented to name and place. Denies pain at this time. Abdomen staples approximated, ANTHONY. Active bowel sounds. Calls appropriately. Plan of care, education and safety measures reviewed and mutually agreed upon with the patient. Needed items including call light in reach, camera and exit alarm in place.        Electronically signed by Bro Goldman RN on 11/23/2022 at 7:57 PM

## 2022-11-24 NOTE — PROGRESS NOTES
[facility] PROGRESS NOTE    11/24/2022 10:57 AM        Name: Tiffany Vazquez . Admitted: 11/14/2022  Primary Care Provider: Luis Balderas MD (Tel: [phone])      Subjective:  .   Seen this am  and awakened from sleep  Feels better each day    Reports that she has been having BM's  No family at bedside during my visit        Reviewed interval ancillary notes    Current Medications  potassium bicarbonate (K-LYTE) disintegrating tablet 50 mEq, BID  potassium chloride (KLOR-CON M) extended release tablet 40 mEq, PRN   Or  potassium bicarb-citric acid (EFFER-K) effervescent tablet 40 mEq, PRN   Or  potassium chloride 10 mEq/100 mL IVPB (Peripheral Line), PRN  acetaminophen (TYLENOL) tablet 650 mg, Q4H PRN  acetaminophen (TYLENOL) tablet 1,000 mg, 3 times per day  sodium chloride flush 0.9 % injection 5-40 mL, 2 times per day  sodium chloride flush 0.9 % injection 5-40 mL, PRN  0.9 % sodium chloride infusion, PRN  enoxaparin (LOVENOX) injection 40 mg, Daily  ondansetron (ZOFRAN-ODT) disintegrating tablet 4 mg, Q8H PRN   Or  ondansetron (ZOFRAN) injection 4 mg, Q6H PRN  phenol 1.4 % mouth spray 1 spray, Q2H PRN  morphine (PF) injection 2 mg, Q2H PRN   Or  morphine injection 4 mg, Q2H PRN  oxyCODONE (ROXICODONE) immediate release tablet 5 mg, Q4H PRN   Or  oxyCODONE (ROXICODONE) immediate release tablet 10 mg, Q4H PRN  pantoprazole (PROTONIX) injection 40 mg, Daily  piperacillin-tazobactam (ZOSYN) 3,375 mg in dextrose 5 % 50 mL IVPB extended infusion (mini-bag), Q8H  diazePAM (VALIUM) injection 2.5 mg, Q4H PRN  sodium chloride flush 0.9 % injection 5-40 mL, 2 times per day  sodium chloride flush 0.9 % injection 5-40 mL, PRN  acetaminophen (TYLENOL) suppository 650 mg, Q6H PRN  morphine injection 4 mg, Q3H PRN      Objective:  /76   Pulse 74   Temp 97.6 °F (36.4 °C) (Oral)   Resp 18   Ht 5' 3\" (1.6 m)   Wt 128 lb 14.4 oz (58.5 kg)   SpO2 95%   BMI 22.83 kg/m²   No intake or output data in the 24 hours ending 11/24/22 1057     Wt Readings from Last 3 Encounters:   11/24/22 128 lb 14.4 oz (58.5 kg)   07/29/22 119 lb 3.2 oz (54.1 kg)   06/08/22 116 lb 9.6 oz (52.9 kg)       General appearance:  Appears comfortable, looks frail , alert and pleasant, up in chair   Eyes: Sclera clear. Pupils equal.  ENT: Moist oral mucosa. Trachea midline, no adenopathy. Cardiovascular: Regular rhythm, normal S1, S2. No murmur. No edema in lower extremities  Respiratory: Not using accessory muscles. Good inspiratory effort. Clear to auscultation bilaterally, no wheeze or crackles. GI: Abdomen soft and flat with active bowel sounds. Abd incision is well approximate and clean. JUNIOR with serous drainage. Musculoskeletal: No cyanosis in digits, neck supple  Neurology: CN 2-12 grossly intact. No speech or motor deficits  Psych: Normal affect. Alert and oriented in time, place and person  Skin: Warm, dry, normal turgor    Labs and Tests:  CBC:   Recent Labs     11/22/22  0453 11/23/22  0519 11/24/22  0508   WBC 8.5 9.7 11.3*   HGB 10.4* 10.2* 9.9*    282 323       BMP:    Recent Labs     11/22/22  0453 11/23/22  0519 11/24/22  0508    138 132*   K 3.1* 3.1* 3.2*    104 100   CO2 25 27 23   BUN 14 11 12   CREATININE <0.5* <0.5* 0.6   GLUCOSE 91 92 77       Hepatic:   No results for input(s): AST, ALT, ALB, BILITOT, ALKPHOS in the last 72 hours. 11/18/22  xray:  Mechanical obstruction at the distal 3rd of the small bowel. Concomitant   small bowel ileus. RECOMMENDATION:   An additional delay radiograph of lower abdomen and pelvis at 24 hours from   this exam 2 determine the exact level in the distal 3rd of the small bowel   where there is an obstruction. Problem List  Principal Problem:    SBO (small bowel obstruction) (HCC)  Resolved Problems:    * No resolved hospital problems.  *       Assessment & Plan: SBO:  POD # 5 from Exploratory Lap with ROBERTO with small bowel and cecum resection,  she is tolerating diet and working with therapy. Ambulation was encouraged. surgery is following   Hypernatremia: resolved   Low K: will replace orally   Metabolic Encephalopathy: resolved   Leukocytosis noted. Encourage IS and out of bed , she is afebrile   blood pressure is improved. She was not taking any prescription meds prior to admission     May need ECF at d/c. She is not a candidate for ARU due to her [employer] insurance     Diet: ADULT ORAL NUTRITION SUPPLEMENT; Breakfast, Dinner; Clear Liquid Oral Supplement  ADULT DIET;  Full Liquid  Code:Full Code  DVT PPX      WIL Santos CNP   11/24/2022 10:57 AM

## 2022-11-25 LAB
ANION GAP SERPL CALCULATED.3IONS-SCNC: 15 MMOL/L (ref 3–16)
BASOPHILS ABSOLUTE: 0.1 K/UL (ref 0–0.2)
BASOPHILS RELATIVE PERCENT: 0.8 %
BUN BLDV-MCNC: 9 MG/DL (ref 7–20)
CALCIUM SERPL-MCNC: 8.3 MG/DL (ref 8.3–10.6)
CHLORIDE BLD-SCNC: 98 MMOL/L (ref 99–110)
CO2: 22 MMOL/L (ref 21–32)
CREAT SERPL-MCNC: 0.6 MG/DL (ref 0.6–1.2)
EOSINOPHILS ABSOLUTE: 0.2 K/UL (ref 0–0.6)
EOSINOPHILS RELATIVE PERCENT: 1.2 %
GFR SERPL CREATININE-BSD FRML MDRD: >60 ML/MIN/{1.73_M2}
GLUCOSE BLD-MCNC: 66 MG/DL (ref 70–99)
GLUCOSE BLD-MCNC: 95 MG/DL (ref 70–99)
HCT VFR BLD CALC: 31.6 % (ref 36–48)
HEMOGLOBIN: 10.5 G/DL (ref 12–16)
LYMPHOCYTES ABSOLUTE: 1.1 K/UL (ref 1–5.1)
LYMPHOCYTES RELATIVE PERCENT: 8.8 %
MCH RBC QN AUTO: 31.3 PG (ref 26–34)
MCHC RBC AUTO-ENTMCNC: 33.2 G/DL (ref 31–36)
MCV RBC AUTO: 94.1 FL (ref 80–100)
MONOCYTES ABSOLUTE: 0.6 K/UL (ref 0–1.3)
MONOCYTES RELATIVE PERCENT: 4.5 %
NEUTROPHILS ABSOLUTE: 10.7 K/UL (ref 1.7–7.7)
NEUTROPHILS RELATIVE PERCENT: 84.7 %
PDW BLD-RTO: 13.4 % (ref 12.4–15.4)
PERFORMED ON: NORMAL
PLATELET # BLD: 376 K/UL (ref 135–450)
PMV BLD AUTO: 8 FL (ref 5–10.5)
POTASSIUM SERPL-SCNC: 4.4 MMOL/L (ref 3.5–5.1)
RBC # BLD: 3.36 M/UL (ref 4–5.2)
SODIUM BLD-SCNC: 135 MMOL/L (ref 136–145)
WBC # BLD: 12.6 K/UL (ref 4–11)

## 2022-11-25 PROCEDURE — 97110 THERAPEUTIC EXERCISES: CPT

## 2022-11-25 PROCEDURE — 6370000000 HC RX 637 (ALT 250 FOR IP): Performed by: PHYSICIAN ASSISTANT

## 2022-11-25 PROCEDURE — 85025 COMPLETE CBC W/AUTO DIFF WBC: CPT

## 2022-11-25 PROCEDURE — 94760 N-INVAS EAR/PLS OXIMETRY 1: CPT

## 2022-11-25 PROCEDURE — 99024 POSTOP FOLLOW-UP VISIT: CPT | Performed by: SURGERY

## 2022-11-25 PROCEDURE — 80048 BASIC METABOLIC PNL TOTAL CA: CPT

## 2022-11-25 PROCEDURE — 97116 GAIT TRAINING THERAPY: CPT

## 2022-11-25 PROCEDURE — C9113 INJ PANTOPRAZOLE SODIUM, VIA: HCPCS | Performed by: SURGERY

## 2022-11-25 PROCEDURE — 97530 THERAPEUTIC ACTIVITIES: CPT

## 2022-11-25 PROCEDURE — 6370000000 HC RX 637 (ALT 250 FOR IP): Performed by: NURSE PRACTITIONER

## 2022-11-25 PROCEDURE — 36415 COLL VENOUS BLD VENIPUNCTURE: CPT

## 2022-11-25 PROCEDURE — 2580000003 HC RX 258: Performed by: SURGERY

## 2022-11-25 PROCEDURE — 6360000002 HC RX W HCPCS: Performed by: SURGERY

## 2022-11-25 PROCEDURE — 1200000000 HC SEMI PRIVATE

## 2022-11-25 RX ORDER — OXYCODONE HYDROCHLORIDE 5 MG/1
5 TABLET ORAL EVERY 8 HOURS PRN
Qty: 9 TABLET | Refills: 0 | Status: SHIPPED | OUTPATIENT
Start: 2022-11-25 | End: 2022-11-28

## 2022-11-25 RX ADMIN — POTASSIUM BICARBONATE 50 MEQ: 977.5 TABLET, EFFERVESCENT ORAL at 20:08

## 2022-11-25 RX ADMIN — ACETAMINOPHEN 1000 MG: 500 TABLET ORAL at 23:32

## 2022-11-25 RX ADMIN — ACETAMINOPHEN 1000 MG: 500 TABLET ORAL at 05:19

## 2022-11-25 RX ADMIN — ENOXAPARIN SODIUM 40 MG: 100 INJECTION SUBCUTANEOUS at 08:30

## 2022-11-25 RX ADMIN — Medication 10 ML: at 21:00

## 2022-11-25 RX ADMIN — Medication 10 ML: at 08:30

## 2022-11-25 RX ADMIN — POTASSIUM BICARBONATE 50 MEQ: 977.5 TABLET, EFFERVESCENT ORAL at 08:31

## 2022-11-25 RX ADMIN — Medication 10 ML: at 20:07

## 2022-11-25 RX ADMIN — ACETAMINOPHEN 1000 MG: 500 TABLET ORAL at 15:16

## 2022-11-25 RX ADMIN — PANTOPRAZOLE SODIUM 40 MG: 40 INJECTION, POWDER, FOR SOLUTION INTRAVENOUS at 08:30

## 2022-11-25 ASSESSMENT — PAIN DESCRIPTION - LOCATION
LOCATION: ABDOMEN
LOCATION: ABDOMEN

## 2022-11-25 ASSESSMENT — PAIN SCALES - GENERAL
PAINLEVEL_OUTOF10: 5
PAINLEVEL_OUTOF10: 6
PAINLEVEL_OUTOF10: 4

## 2022-11-25 ASSESSMENT — PAIN DESCRIPTION - DESCRIPTORS
DESCRIPTORS: DISCOMFORT
DESCRIPTORS: DISCOMFORT

## 2022-11-25 NOTE — DISCHARGE SUMMARY
[facility] DISCHARGE SUMMARY    Patient Demographics    Matt Che  Date of Birth. 1940  MRN. [id number]     Primary care provider. Iban Amaya MD  (Tel: [phone])    Admit date: 11/14/2022    Discharge date 11/25/2022  Note Date: 11/25/2022     Reason for Hospitalization. Chief Complaint   Patient presents with    Abdominal Pain     Pt came in with c/o L sided abdominal pain that started around 11am. She is also c/o nausea. Significant Findings. Principal Problem:    SBO (small bowel obstruction) ([address]  Resolved Problems:    * No resolved hospital problems. *       Problems and results from this hospitalization that need follow up. Follow up with surgery in 1 week     Significant test results and incidental findings. 11/18/22  xray:  Mechanical obstruction at the distal 3rd of the small bowel. Concomitant   small bowel ileus. RECOMMENDATION:   An additional delay radiograph of lower abdomen and pelvis at 24 hours from   this exam 2 determine the exact level in the distal 3rd of the small bowel   where there is an obstruction. Invasive procedures and treatments. 11/19/22 Exploratory Lap with ROBERTO with ileocolonic resection     Problem-based Hospital Course. The patient was admitted from home with abdominal pain. She was followed by [facility] and taken to the OR for SBO with Exploratory Lap with ROBERTO with cecum resection. Post operative course was uneventful. She was d/c to ECF on her 6th post operative day. She initially had some encephalopathy, however this quickly resolved  Her K was Low and responded to oral therapy. She was tolerating a diet and having BM's prior to  her d/c to ECF   IS was encouraged. She was afebrile at d/c   PO intake was encouraged ,  supplements encouraged     Consults.   IP CONSULT TO GENERAL SURGERY    Physical examination on discharge day. BP (!) 151/73   Pulse 77   Temp 98.3 °F (36.8 °C) (Oral)   Resp 18   Ht 5' 3\" (1.6 m)   Wt 119 lb 12.8 oz (54.3 kg) Comment: 1 pillow, 1 blanket  SpO2 98%   BMI 21.22 kg/m²   General appearance. Alert. Looks comfortable but frail , alert and pleasant   HEENT. Sclera clear. Moist mucus membranes. Cardiovascular. Regular rate and rhythm, normal S1, S2. No murmur. Respiratory. Not using accessory muscles. Clear to auscultation bilaterally, no wheeze. Gastrointestinal. Abdomen soft with active bowel sounds. Operative incision well approximated and clean   Neurology. Facial symmetry. No speech deficits. Moving all extremities equally. Extremities. No edema in lower extremities. Skin. Warm, dry, normal turgor    Condition at time of discharge stable     Medication instructions provided to patient at discharge. Medication List        START taking these medications      oxyCODONE 5 MG immediate release tablet  Commonly known as: ROXICODONE  Take 1 tablet by mouth every 8 hours as needed for Pain for up to 3 days. CONTINUE taking these medications      alendronate 10 MG tablet  Commonly known as: FOSAMAX     CALCIUM CITRATE-VITAMIN D PO     estradiol 0.1 MG/GM vaginal cream  Commonly known as: ESTRACE     VITAMIN D3 PO               Where to Get Your Medications        You can get these medications from any pharmacy    Bring a paper prescription for each of these medications  oxyCODONE 5 MG immediate release tablet         Discharge recommendations given to patient. Follow Up. in 1 week   Disposition. SNF  Activity. activity as tolerated  Diet: ADULT ORAL NUTRITION SUPPLEMENT; Breakfast, Dinner; Clear Liquid Oral Supplement  ADULT DIET; Regular      Spent 35 minutes in discharge process.     Signed:  WIL Roberto CNP     11/25/2022 11:59 AM

## 2022-11-25 NOTE — PROGRESS NOTES
Nutrition Note    RECOMMENDATIONS  PO Diet: Continue current diet   ONS: Continue current supplement   Nutrition Support: None      NUTRITION ASSESSMENT   Pt's nutrition status is improving. Diet was advanced to full liquid on 11/23 and regular diet on 11/24. Pt with good PO intake; seen over breakfast tray having consumed 75% of meal. Pt with Ensure Clear ordered BID but unclear if she is taking this; will continue to offer to promote healing s/p surgery. Nutrition Related Findings: Na+ 135. LBM 11/24. Disoriented to place, situation. Wounds: Surgical Incision, Wound Vac  Nutrition Education:  Education not indicated   Nutrition Goals: PO intake 50% or greater     MALNUTRITION ASSESSMENT   Acute Illness  Malnutrition Status: At risk for malnutrition (Comment)    NUTRITION DIAGNOSIS   Increased nutrient needs related to increase demand for energy/nutrients as evidenced by other (comment) (post-op healing)    CURRENT NUTRITION THERAPIES  ADULT ORAL NUTRITION SUPPLEMENT; Breakfast, Dinner; Clear Liquid Oral Supplement  ADULT DIET; Regular     PO Intake: 51-75%   PO Supplement Intake:Unable to assess    ANTHROPOMETRICS  Current Height: 5' 3\" (160 cm)  Current Weight: 119 lb 12.8 oz (54.3 kg) (1 pillow, 1 blanket)    Admission weight: 118 lb (53.5 kg)  Ideal Body Weight (IBW): 115 lbs  (52 kg)        BMI: 21.2    COMPARATIVE STANDARDS  Energy (kcal):  7697-6406     Protein (g):   grams       Fluid (mL/day):  2615-8632 mL    The patient will be monitored per nutrition standards of care. Consult dietitian if additional nutrition interventions are needed prior to RD reassessment.      Anisa Slaughter, [address],     Contact: [phone]

## 2022-11-25 NOTE — DISCHARGE INSTR - COC
Continuity of Care Form    Patient Name: Aisha Min   :  1940  MRN:  [id number]    [address] [place] [address] date:  2022  Discharge date:  [id number]      Code Status Order: Full Code   Advance Directives:     Admitting Physician:  Emmanuelle Marrufo MD  PCP: Dino Iglesias MD    Discharging Nurse: Aisha Pako RN  [address] Unit/Room#: 5CT-6822/6158-76  Discharging Unit Phone Number: [phone]    Emergency Contact:   Extended Emergency Contact Information  Primary Emergency Contact: Kailyn Oven  Address: [address], [address]  Home Phone: [phone]  Mobile Phone: [phone]  Relation: Spouse  Secondary Emergency Contact: [address]  Mobile Phone: [phone]  Relation: Child    Past Surgical History:  Past Surgical History:   Procedure Laterality Date    COLONOSCOPY      EYE SURGERY Right     cataract    HYSTERECTOMY (CERVIX STATUS UNKNOWN)  1984    LAPAROTOMY N/A 2022    EXPLORATORY LAPAROTOMY, LYSIS OF ADHESIONS,  BOWEL RESECTION performed by Octavia French MD at [address] [place] [address]      melanoma removed     SKIN Fairy Palin Right     Dr. Nancy Vela       Immunization History:   Immunization History   Administered Date(s) Administered    COVID-19, MODERNA BLUE border, Primary or Immunocompromised, (age 12y+), IM, 100 mcg/0.5mL 2021, 2021, 2021    COVID-19, MODERNA Bivalent BOOSTER, (age 12y+), IM, 50 mcg/0.5 mL 10/03/2022    COVID-19, MODERNA Booster BLUE border, (age 18y+), IM, 50mcg/0.25mL 2021, 2022    Influenza Vaccine, unspecified formulation 2008, 2009, 2010, 10/04/2013, 10/06/2014, 10/23/2015, 2016, 10/12/2017    Influenza, FLUZONE (age 72 y+), High Dose, 0.7mL 2020, 10/03/2022    Influenza, High Dose (Fluzone 65 yrs and older) 10/09/2018, 2021    Influenza, Triv, inactivated, subunit, adjuvanted, IM (Fluad 65 yrs and older) 2019    Pneumococcal Conjugate 13-valent [place] Innocent) 11/03/2008, 10/23/2015    Pneumococcal Polysaccharide ([id number]) 11/03/2008    Td, unspecified formulation 01/01/2003, 06/28/2013    Tdap (Boostrix, Adacel) 01/08/2019    Zoster Recombinant (Shingrix) 02/27/2019, 07/08/2019       Active Problems:  Patient Active Problem List   Diagnosis Code    Age-related osteoporosis without current pathological fracture M81.0    White coat syndrome without hypertension R03.0    SBO (small bowel obstruction) ([facility] [address] K56.609       Isolation/Infection:   Isolation            No Isolation          Patient Infection Status       None to display            Nurse Assessment:  Last Vital Signs: BP (!) 151/73   Pulse 77   Temp 98.3 °F (36.8 °C) (Oral)   Resp 18   Ht 5' 3\" (1.6 m)   Wt 119 lb 12.8 oz (54.3 kg) Comment: 1 pillow, 1 blanket  SpO2 98%   BMI 21.22 kg/m²     Last documented pain score (0-10 scale): Pain Level: 5  Last Weight:   Wt Readings from Last 1 Encounters:   11/25/22 119 lb 12.8 oz (54.3 kg)     Mental Status:  disoriented and alert    IV Access:  - None    Nursing Mobility/ADLs:  Walking   Assisted  Transfer  Assisted  Bathing  Dependent  Dressing  Assisted  Toileting  Assisted  Feeding  Assisted  Med Admin  Dependent  Med Delivery   whole    Wound Care Documentation and Therapy:  Negative Pressure Wound Therapy Abdomen Medial (Active)   Wound Type Surgical 11/24/22 1955   Dressing Type Guaze 11/25/22 0815   Dressing Status Old drainage noted 11/23/22 1956   Dressing Changed Changed/New 11/19/22 1940   Drainage Amount Small 11/25/22 0815   Drainage Description Sanguinous 11/24/22 1955   Output (ml) 0 ml 11/21/22 0427   Number of days: 6       Incision 11/19/22 Abdomen Medial (Active)   Dressing Status Clean;Dry; Intact 11/25/22 0815   Dressing/Treatment Tape/Soft cloth adhesive tape;Gauze dressing/dressing sponge 11/25/22 0815   Closure Staples 11/25/22 0815   Drainage Amount None 11/25/22 0815   Number of days: 6 Elimination:  Continence: Bowel: Yes  Bladder: Yes  Urinary Catheter: None   Colostomy/Ileostomy/Ileal Conduit: No       Date of Last BM: 11/27/2022    Intake/Output Summary (Last 24 hours) at 11/25/2022 1158  Last data filed at 11/25/2022 0531  Gross per 24 hour   Intake --   Output 70 ml   Net -70 ml     I/O last 3 completed shifts:  In: -   Out: 70 [Drains:70]    Safety Concerns:     Sundowners [place], At Risk for Falls, and Aspiration Risk    Impairments/Disabilities:      None    Nutrition Therapy:  Current Nutrition Therapy:   - Oral Diet:  General  - Oral Nutrition Supplement:  Standard  twice a day    Routes of Feeding: Oral  Liquids: Thin Liquids  Daily Fluid Restriction: no  Last Modified Barium Swallow with Video (Video Swallowing Test): not done    Treatments at the Time of Hospital Discharge:   Respiratory Treatments:   Oxygen Therapy:  is not on home oxygen therapy. Ventilator:    - No ventilator support    Rehab Therapies: Physical Therapy and Occupational Therapy  Weight Bearing Status/Restrictions: No weight bearing restrictions  Other Medical Equipment (for information only, NOT a DME order):  wheelchair and walker  Other Treatments: Abdominal treatment- Wet to dry dressing daily starting this evening, at next wound change please swab the wound base to obtain culture.     Patient's personal belongings (please select all that are sent with patient):  None    RN SIGNATURE:  Electronically signed by Damon Gotti RN on 11/28/22 at 3:19 PM EST    CASE MANAGEMENT/SOCIAL WORK SECTION    Inpatient Status Date: 11/14/22    Readmission Risk Assessment Score:  Readmission Risk              Risk of Unplanned Readmission:  12           Discharging to Facility/ [facility]  [place], [address]  Phone: [phone]  Fax: [phone]             / signature: Electronically signed by Damon Gotti RN on 11/28/22 at 2:01 PM EST    PHYSICIAN

## 2022-11-25 NOTE — PROGRESS NOTES
Logan Moreira is a 79 y.o. female patient.     Current Facility-Administered Medications   Medication Dose Route Frequency Provider Last Rate Last Admin    potassium bicarbonate (K-LYTE) disintegrating tablet 50 mEq  50 mEq Oral BID WIL Tsang CNP   50 mEq at 11/25/22 0831    potassium chloride (KLOR-CON M) extended release tablet 40 mEq  40 mEq Oral PRN Mavis Herman MD        Or    potassium bicarb-citric acid (EFFER-K) effervescent tablet 40 mEq  40 mEq Oral PRN Mavis Herman MD   40 mEq at 11/22/22 1201    Or    potassium chloride 10 mEq/100 mL IVPB (Peripheral Line)  10 mEq IntraVENous PRN Mavis Herman MD        acetaminophen (TYLENOL) tablet 650 mg  650 mg Oral Q4H PRN Cara Barriga PA-C        acetaminophen (TYLENOL) tablet 1,000 mg  1,000 mg Oral 3 times per day Cara Barriga PA-C   1,000 mg at 11/25/22 0519    sodium chloride flush 0.9 % injection 5-40 mL  5-40 mL IntraVENous 2 times per day Mavis Herman MD   10 mL at 11/24/22 1950    sodium chloride flush 0.9 % injection 5-40 mL  5-40 mL IntraVENous PRN Mavis Herman MD        0.9 % sodium chloride infusion   IntraVENous PRN Mavis Herman MD 5 mL/hr at 11/21/22 0110 New Bag at 11/21/22 0110    enoxaparin (LOVENOX) injection 40 mg  40 mg SubCUTAneous Daily Mavis Herman MD   40 mg at 11/25/22 0830    ondansetron (ZOFRAN-ODT) disintegrating tablet 4 mg  4 mg Oral Q8H PRN Mavis Herman MD        Or    ondansetron [facility]) injection 4 mg  4 mg IntraVENous Q6H PRN Mavis Herman MD        phenol 1.4 % mouth spray 1 spray  1 spray Mouth/Throat Q2H ELYSSA Herman MD   1 spray at 11/19/22 1956    morphine (PF) injection 2 mg  2 mg IntraVENous Q2H PRN Mavis Herman MD   2 mg at 11/22/22 0909    Or    morphine injection 4 mg  4 mg IntraVENous Q2H PRN Mavis Herman MD   4 mg at 11/22/22 1621    oxyCODONE (ROXICODONE) immediate release tablet 5 mg  5 mg Oral Q4H PRN Mavis Herman MD   5 mg at 11/24/22 1955    Or oxyCODONE (ROXICODONE) immediate release tablet 10 mg  10 mg Oral Q4H PRN Gil Jordan MD        pantoprazole (PROTONIX) injection 40 mg  40 mg IntraVENous Daily Gil Jordan MD   40 mg at 11/25/22 0830    diazePAM (VALIUM) injection 2.5 mg  2.5 mg IntraVENous Q4H PRN Gil Jordan MD   2.5 mg at 11/16/22 0656    sodium chloride flush 0.9 % injection 5-40 mL  5-40 mL IntraVENous 2 times per day Gil Jordan MD   10 mL at 11/25/22 0830    sodium chloride flush 0.9 % injection 5-40 mL  5-40 mL IntraVENous PRN Gil Jordan MD   10 mL at 11/15/22 0645    acetaminophen (TYLENOL) suppository 650 mg  650 mg Rectal Q6H PRN Gil Jordan MD        morphine injection 4 mg  4 mg IntraVENous Q3H PRN Gil Jordan MD   4 mg at 11/18/22 0803     No Known Allergies  Principal Problem:    SBO (small bowel obstruction) ([facility] [address]  Resolved Problems:    * No resolved hospital problems. *    Blood pressure (!) 151/73, pulse 77, temperature 98.3 °F (36.8 °C), temperature source Oral, resp. rate 18, height 5' 3\" (1.6 m), weight 119 lb 12.8 oz (54.3 kg), SpO2 98 %. Subjective:  Symptoms:  Improved. Diet:  Adequate intake. Pain:  She complains of pain that is mild. Objective:  General Appearance:  Comfortable. Vital signs: (most recent): Blood pressure (!) 151/73, pulse 77, temperature 98.3 °F (36.8 °C), temperature source Oral, resp. rate 18, height 5' 3\" (1.6 m), weight 119 lb 12.8 oz (54.3 kg), SpO2 98 %. Output: Producing urine and producing stool. Lungs:  Normal effort and normal respiratory rate. Abdomen: Abdomen is soft and non-distended. (Incision approximated without evidence of infection. JUNIOR with minimal serous output). Neurological: Patient is alert. Skin:  Warm and dry. Assessment & Plan 79-year-old female who is postop day #6 status post lysis of adhesions and ileocolonic resection. Doing well. P.o. intake continues to improve. Having bowel function. Continue PT OT.   Okay to discharge to [facility] when arrangements made.     Maddy Blanco MD  11/25/2022

## 2022-11-25 NOTE — PROGRESS NOTES
Incentive Spirometry education and demonstration completed by Respiratory Therapy Yes      Response to education: Fair     Teaching Time: 5 minutes    Minimum Predicted Vital Capacity - 524 mL. Patient's Actual Vital Capacity - 500 mL.  Turning over to Nursing for routine follow-up No.    Comments: pt. Requires more education and encouragement on IS    Electronically signed by Issa Gonzalez RCP on 11/25/2022 at 1:38 AM

## 2022-11-25 NOTE — PLAN OF CARE
Problem: ABCDS Injury Assessment  Goal: Absence of physical injury  11/25/2022 1110 by Pricila Torres RN  Outcome: Progressing  11/24/2022 2126 by Deirdre Barrientos RN  Outcome: Progressing     Problem: Pain  Goal: Verbalizes/displays adequate comfort level or baseline comfort level  11/25/2022 1110 by Pricila Torres RN  Outcome: Progressing  11/24/2022 2126 by Deirdre Barrientos RN  Outcome: Progressing     Problem: Skin/Tissue Integrity  Goal: Absence of new skin breakdown  Description: 1. Monitor for areas of redness and/or skin breakdown  2. Assess vascular access sites hourly  3. Every 4-6 hours minimum:  Change oxygen saturation probe site  4. Every 4-6 hours:  If on nasal continuous positive airway pressure, respiratory therapy assess nares and determine need for appliance change or resting period.   11/25/2022 1110 by Pricila Torres RN  Outcome: Progressing  11/24/2022 2126 by Deirdre Barrientos RN  Outcome: Progressing     Problem: Safety - Adult  Goal: Free from fall injury  11/25/2022 1110 by Pricila Torres RN  Outcome: Progressing  11/24/2022 2126 by Deirdre Barrientos RN  Outcome: Progressing     Problem: Discharge Planning  Goal: Discharge to home or other facility with appropriate resources  11/25/2022 1110 by Pricila Torres RN  Outcome: Progressing  11/24/2022 2126 by Deirdre Barrientos RN  Outcome: Progressing     Problem: Nutrition Deficit:  Goal: Optimize nutritional status  11/25/2022 1110 by Pricila Torres RN  Outcome: Progressing  11/24/2022 2126 by Deirdre Barrientos RN  Outcome: Progressing

## 2022-11-25 NOTE — PROGRESS NOTES
On chart review FBS 66 this am. ZANE Jameson notified & states she already ate all of her breakfast & he will recheck a POC blood sugar. Secure message sent to CNP. Leydi Pollock RN, BSN, [facility] [facility] [place]

## 2022-11-25 NOTE — PROGRESS NOTES
level  Home Access: level entry  Bathroom Layout: tub/shower unit  Bathroom Equipment:  no prior equipment  Toilet Height: standard height, elevated height  Home Equipment: no prior equipment  Transfer Assistance: Independent without use of device  Ambulation Assistance:Independent without use of device  ADL Assistance: independent with all ADL's  IADL Assistance:  shares all home making responsibilities with spouse  Active :        [x] Yes                 [] No  Hand Dominance: [] Left                 [x] Right  Current Employment: retired. 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 Hobbies: \"working with other people\"  Recent Falls: denies recent falls. Examination   Vision:   Vision Gross Assessment: Impaired and Vision Corrective Device: wears glasses at all times  Hearing:   EDWINMarkTend      Subjective  General: Pt seated on BSC on arrival, agreeable to participate in PT treatment session. Pts  present. Pt requesting to get back into bed. Pt and pts  reporting that the last PT/OT made the pt very sore.    Pain: 0/10  Pain Interventions: not applicable     Functional Mobility  Bed Mobility  Supine to Sit: moderate assistance  Sit to Supine: moderate assistance- x 2  Scooting: contact guard assistance, moderate assistance  Rolling left: minimal assistance  Rolling right: minimal assistance  Comments: Verbal cues required for sequencing and initiation, verbal and tactile cues for log rolling  Transfers  Sit to stand transfer: minimal assistance  Stand to sit transfer: minimal assistance  Stand step transfer: contact guard assistance   Comments: RW utilized for all transfers, verbal cues required for hand placement  Ambulation  Surface:level surface  Assistive Device: rolling walker  Assistance: contact guard assistance  Distance: 40'  Gait Mechanics: decreased gait speed, shuffling pattern with decreased step height, slight trunk flexion throughout the gait cycle, steady with no losses of balance   Comments:    Stair Mobility  Stair mobility not completed on this date. Comments:  Wheelchair Mobility:  No w/c mobility completed on this date. Comments:  Balance  Static Sitting Balance: fair (-): maintains balance at SBA with use of UE support  Dynamic Sitting Balance: fair (-): maintains balance at SBA with use of UE support  Static Standing Balance: fair (-): maintains balance at CGA with use of UE support  Dynamic Standing Balance: fair (-): maintains balance at CGA with use of UE support  Comments:     Other Therapeutic Interventions  Pt rolled B to complete brief change and was dependent for pericare following urination. Pt participated in supine AROM therapeutic exercises including:  - Heel slides (1 x 15 B)  - Ankle plantarflexion/dorsiflexion (1 x 15 B)  - Hip abduction/adduction (1 x 15 B)  - Quad sets (1 x 15 B)  - Glute sets (1 x 15 B)    Functional Outcomes  AM-PAC Inpatient Mobility Raw Score : 16              Cognition  Overall Cognitive Status: Impaired  Problem Solving: assistance required to generate solutions, assistance required to implement solutions, assistance required to identify errors made, assistance required to correct errors made  Insights: decreased awareness of deficits  Initiation: requires cues for some  Sequencing: requires cues for some  Command Following:   [facility]    Education  Barriers To Learning: cognition  Patient Education: patient educated on goals, PT role and benefits, general safety, functional mobility training, proper use of assistive device/equipment, family education, transfer training, discharge recommendations  Learning Assessment:  patient verbalizes understanding, would benefit from continued reinforcement, spouse verbalizes understanding    Assessment  Activity Tolerance: Pt tolerated the PT treatment session well with no significant limitations.   Impairments Requiring Therapeutic Intervention: decreased functional mobility, decreased strength, decreased cognition, decreased endurance, decreased balance  Prognosis: good  Clinical Assessment: Pt presents today with slight improvement in functional mobility. Pt continues to require increased assistance for performance of bed mobility tasks and verbal and tactile cues for rolling. Pt was able to increase her ambulation distance with use of a RW and CGA. Pt will continue to benefit from skilled PT to facilitate return to PLOF and to promote independence.   Safety Interventions: patient left in bed, bed alarm in place, call light within reach, gait belt, nurse notified, and family/caregiver present    Plan  Frequency: 3-5 x/per week  Current Treatment Recommendations: strengthening, balance training, functional mobility training, transfer training, gait training, endurance training, neuromuscular re-education, modalities, patient/caregiver education, home exercise program, safety education, equipment evaluation/education, and positioning    Goals  Patient Goals: pt did not state   Short Term Goals:  Time Frame: upon d/c  Short term goal 1: Pt will perform bed mobility with supervision  Short term goal 2: Pt will perform transfers with LRAD and SBA  Short term goal 3: Pt will ambulate 22' with LRAD and CGA    *No goals met 11/25/22    Therapy Session Time      Individual Group Co-treatment   Time In 0940       Time Out 1029       Minutes 49         Timed Code Treatment Minutes: 49 Minutes  Total Treatment Minutes: 49 Minutes        Electronically Signed By: Wally Pool, PT  Gabby Meyer PT, DPT [id number]

## 2022-11-25 NOTE — PLAN OF CARE
Problem: ABCDS Injury Assessment  Goal: Absence of physical injury  11/24/2022 2126 by Liane Magdaleno RN  Outcome: Progressing  11/24/2022 1058 by Shravan Cartagena RN  Outcome: Progressing     Problem: Pain  Goal: Verbalizes/displays adequate comfort level or baseline comfort level  11/24/2022 2126 by Liane Magdaleno RN  Outcome: Progressing  11/24/2022 1058 by Shravan Cartagena RN  Outcome: Progressing     Problem: Skin/Tissue Integrity  Goal: Absence of new skin breakdown  Description: 1. Monitor for areas of redness and/or skin breakdown  2. Assess vascular access sites hourly  3. Every 4-6 hours minimum:  Change oxygen saturation probe site  4. Every 4-6 hours:  If on nasal continuous positive airway pressure, respiratory therapy assess nares and determine need for appliance change or resting period.   11/24/2022 2126 by Liane Magdaleno RN  Outcome: Progressing  11/24/2022 1058 by Shravan Cartagena RN  Outcome: Progressing     Problem: Safety - Adult  Goal: Free from fall injury  11/24/2022 2126 by Liane Magdaleno RN  Outcome: Progressing  11/24/2022 1058 by Shravan Cartagena RN  Outcome: Progressing     Problem: Discharge Planning  Goal: Discharge to home or other facility with appropriate resources  11/24/2022 2126 by Liane Magdaleno RN  Outcome: Progressing  11/24/2022 1058 by Shravan Cartagena RN  Outcome: Progressing     Problem: Nutrition Deficit:  Goal: Optimize nutritional status  11/24/2022 2126 by Liane Magdaleno RN  Outcome: Progressing  11/24/2022 1058 by Shravan Cartagena RN  Outcome: Progressing

## 2022-11-26 LAB
ANION GAP SERPL CALCULATED.3IONS-SCNC: 13 MMOL/L (ref 3–16)
BASOPHILS ABSOLUTE: 0 K/UL (ref 0–0.2)
BASOPHILS RELATIVE PERCENT: 0.4 %
BUN BLDV-MCNC: 7 MG/DL (ref 7–20)
CALCIUM SERPL-MCNC: 7.9 MG/DL (ref 8.3–10.6)
CHLORIDE BLD-SCNC: 99 MMOL/L (ref 99–110)
CO2: 21 MMOL/L (ref 21–32)
CREAT SERPL-MCNC: <0.5 MG/DL (ref 0.6–1.2)
EOSINOPHILS ABSOLUTE: 0.1 K/UL (ref 0–0.6)
EOSINOPHILS RELATIVE PERCENT: 1 %
GFR SERPL CREATININE-BSD FRML MDRD: >60 ML/MIN/{1.73_M2}
GLUCOSE BLD-MCNC: 79 MG/DL (ref 70–99)
HCT VFR BLD CALC: 29.6 % (ref 36–48)
HEMOGLOBIN: 9.6 G/DL (ref 12–16)
LYMPHOCYTES ABSOLUTE: 1 K/UL (ref 1–5.1)
LYMPHOCYTES RELATIVE PERCENT: 8.5 %
MCH RBC QN AUTO: 30.3 PG (ref 26–34)
MCHC RBC AUTO-ENTMCNC: 32.3 G/DL (ref 31–36)
MCV RBC AUTO: 93.8 FL (ref 80–100)
MONOCYTES ABSOLUTE: 0.5 K/UL (ref 0–1.3)
MONOCYTES RELATIVE PERCENT: 4.4 %
NEUTROPHILS ABSOLUTE: 10.2 K/UL (ref 1.7–7.7)
NEUTROPHILS RELATIVE PERCENT: 85.7 %
PDW BLD-RTO: 13.5 % (ref 12.4–15.4)
PLATELET # BLD: 382 K/UL (ref 135–450)
PMV BLD AUTO: 8 FL (ref 5–10.5)
POTASSIUM SERPL-SCNC: 4.4 MMOL/L (ref 3.5–5.1)
RBC # BLD: 3.16 M/UL (ref 4–5.2)
SODIUM BLD-SCNC: 133 MMOL/L (ref 136–145)
WBC # BLD: 11.9 K/UL (ref 4–11)

## 2022-11-26 PROCEDURE — 80048 BASIC METABOLIC PNL TOTAL CA: CPT

## 2022-11-26 PROCEDURE — 2580000003 HC RX 258: Performed by: SURGERY

## 2022-11-26 PROCEDURE — 6360000002 HC RX W HCPCS: Performed by: SURGERY

## 2022-11-26 PROCEDURE — 1200000000 HC SEMI PRIVATE

## 2022-11-26 PROCEDURE — 99024 POSTOP FOLLOW-UP VISIT: CPT | Performed by: SURGERY

## 2022-11-26 PROCEDURE — C9113 INJ PANTOPRAZOLE SODIUM, VIA: HCPCS | Performed by: SURGERY

## 2022-11-26 PROCEDURE — 36415 COLL VENOUS BLD VENIPUNCTURE: CPT

## 2022-11-26 PROCEDURE — 6370000000 HC RX 637 (ALT 250 FOR IP): Performed by: PHYSICIAN ASSISTANT

## 2022-11-26 PROCEDURE — 6370000000 HC RX 637 (ALT 250 FOR IP): Performed by: NURSE PRACTITIONER

## 2022-11-26 PROCEDURE — 85025 COMPLETE CBC W/AUTO DIFF WBC: CPT

## 2022-11-26 RX ADMIN — Medication 10 ML: at 19:59

## 2022-11-26 RX ADMIN — ACETAMINOPHEN 1000 MG: 500 TABLET ORAL at 06:21

## 2022-11-26 RX ADMIN — POTASSIUM BICARBONATE 50 MEQ: 977.5 TABLET, EFFERVESCENT ORAL at 08:50

## 2022-11-26 RX ADMIN — ACETAMINOPHEN 1000 MG: 500 TABLET ORAL at 15:55

## 2022-11-26 RX ADMIN — POTASSIUM BICARBONATE 50 MEQ: 977.5 TABLET, EFFERVESCENT ORAL at 19:57

## 2022-11-26 RX ADMIN — Medication 10 ML: at 08:51

## 2022-11-26 RX ADMIN — ACETAMINOPHEN 1000 MG: 500 TABLET ORAL at 22:44

## 2022-11-26 RX ADMIN — Medication 10 ML: at 08:50

## 2022-11-26 RX ADMIN — ENOXAPARIN SODIUM 40 MG: 100 INJECTION SUBCUTANEOUS at 08:50

## 2022-11-26 RX ADMIN — Medication 10 ML: at 22:45

## 2022-11-26 RX ADMIN — PANTOPRAZOLE SODIUM 40 MG: 40 INJECTION, POWDER, FOR SOLUTION INTRAVENOUS at 08:50

## 2022-11-26 ASSESSMENT — PAIN DESCRIPTION - DESCRIPTORS
DESCRIPTORS: ACHING
DESCRIPTORS: DISCOMFORT
DESCRIPTORS: DISCOMFORT

## 2022-11-26 ASSESSMENT — PAIN SCALES - GENERAL
PAINLEVEL_OUTOF10: 4
PAINLEVEL_OUTOF10: 5
PAINLEVEL_OUTOF10: 3

## 2022-11-26 ASSESSMENT — PAIN DESCRIPTION - LOCATION
LOCATION: ABDOMEN

## 2022-11-26 ASSESSMENT — PAIN DESCRIPTION - ORIENTATION
ORIENTATION: MID

## 2022-11-26 NOTE — PLAN OF CARE
Problem: ABCDS Injury Assessment  Goal: Absence of physical injury  11/26/2022 1237 by Dyana Jacobsen RN  Flowsheets (Taken 11/26/2022 1237)  Absence of Physical Injury: Implement safety measures based on patient assessment  11/26/2022 1237 by Dyana Jacobsen RN  Outcome: Progressing  Flowsheets (Taken 11/26/2022 1237)  Absence of Physical Injury: Implement safety measures based on patient assessment  11/26/2022 0232 by HERBERT Clark  Outcome: Progressing     Problem: Pain  Goal: Verbalizes/displays adequate comfort level or baseline comfort level  11/26/2022 1237 by Dyana Jacobsen RN  Flowsheets (Taken 11/26/2022 1237)  Verbalizes/displays adequate comfort level or baseline comfort level:   Encourage patient to monitor pain and request assistance   Assess pain using appropriate pain scale  11/26/2022 1237 by Dyana Jacobsen RN  Outcome: Progressing  Flowsheets (Taken 11/26/2022 1237)  Verbalizes/displays adequate comfort level or baseline comfort level:   Encourage patient to monitor pain and request assistance   Assess pain using appropriate pain scale  11/26/2022 0232 by HERBERT Clark  Outcome: Progressing     Problem: Skin/Tissue Integrity  Goal: Absence of new skin breakdown  Description: 1. Monitor for areas of redness and/or skin breakdown  2. Assess vascular access sites hourly  3. Every 4-6 hours minimum:  Change oxygen saturation probe site  4. Every 4-6 hours:  If on nasal continuous positive airway pressure, respiratory therapy assess nares and determine need for appliance change or resting period.   11/26/2022 1237 by Dyana Jacobsen RN  Outcome: Progressing  11/26/2022 0232 by Mignon Clarke  Outcome: Progressing     Problem: Safety - Adult  Goal: Free from fall injury  11/26/2022 1237 by Dyana Jacobsen RN  Flowsheets (Taken 11/26/2022 1237)  Free From Fall Injury: Instruct family/caregiver on patient safety  11/26/2022 1237 by Dyana Jacobsen RN  Outcome: Progressing  Flowsheets (Taken 11/26/2022 1237)  Free From Fall Injury: Instruct family/caregiver on patient safety  11/26/2022 0232 by Margarito Khalil  Outcome: Progressing     Problem: Discharge Planning  Goal: Discharge to home or other facility with appropriate resources  11/26/2022 1237 by Cecilia Caro RN  Flowsheets (Taken 11/26/2022 1237)  Discharge to home or other facility with appropriate resources: Arrange for needed discharge resources and transportation as appropriate  11/26/2022 1237 by Cecilia Caro RN  Outcome: Progressing  Flowsheets (Taken 11/26/2022 1237)  Discharge to home or other facility with appropriate resources: Arrange for needed discharge resources and transportation as appropriate  11/26/2022 0232 by HERBERT NEGRETE  Outcome: Progressing     Problem: Nutrition Deficit:  Goal: Optimize nutritional status  11/26/2022 1237 by Cecilia Caro RN  Flowsheets (Taken 11/26/2022 1237)  Nutrient intake appropriate for improving, restoring, or maintaining nutritional needs:   Assess nutritional status and recommend course of action   Monitor oral intake, labs, and treatment plans  11/26/2022 1237 by Cecilia Caro RN  Outcome: Progressing  Flowsheets (Taken 11/26/2022 1237)  Nutrient intake appropriate for improving, restoring, or maintaining nutritional needs:   Assess nutritional status and recommend course of action   Monitor oral intake, labs, and treatment plans  11/26/2022 0232 by HERBERT Geiger  Outcome: Progressing

## 2022-11-26 NOTE — PROGRESS NOTES
Incentive Spirometry education and demonstration completed by Respiratory Therapy Yes      Response to education: Very Good     Teaching Time: 5 minutes    Minimum Predicted Vital Capacity - 547 mL. Patient's Actual Vital Capacity - 1000 mL. Turning over to Nursing for routine follow-up Yes. Comments: pt. Performed IS very well.     Electronically signed by Seymour Dent RCP on 11/25/2022 at 9:24 PM

## 2022-11-26 NOTE — PROGRESS NOTES
[facility] PROGRESS NOTE    11/26/2022 1:22 PM        Name: Kilo Nascimento . Admitted: 11/14/2022  Primary Care Provider: Leonila Lucero MD (Tel: [phone])      Subjective:  .       Seen while up walking after having BM this am   at bedside  Awaiting pre cert for ecf        Reviewed interval ancillary notes    Current Medications  potassium bicarbonate (K-LYTE) disintegrating tablet 50 mEq, BID  potassium chloride (KLOR-CON M) extended release tablet 40 mEq, PRN   Or  potassium bicarb-citric acid (EFFER-K) effervescent tablet 40 mEq, PRN   Or  potassium chloride 10 mEq/100 mL IVPB (Peripheral Line), PRN  acetaminophen (TYLENOL) tablet 650 mg, Q4H PRN  acetaminophen (TYLENOL) tablet 1,000 mg, 3 times per day  sodium chloride flush 0.9 % injection 5-40 mL, 2 times per day  sodium chloride flush 0.9 % injection 5-40 mL, PRN  0.9 % sodium chloride infusion, PRN  enoxaparin (LOVENOX) injection 40 mg, Daily  ondansetron (ZOFRAN-ODT) disintegrating tablet 4 mg, Q8H PRN   Or  ondansetron (ZOFRAN) injection 4 mg, Q6H PRN  phenol 1.4 % mouth spray 1 spray, Q2H PRN  morphine (PF) injection 2 mg, Q2H PRN   Or  morphine injection 4 mg, Q2H PRN  oxyCODONE (ROXICODONE) immediate release tablet 5 mg, Q4H PRN   Or  oxyCODONE (ROXICODONE) immediate release tablet 10 mg, Q4H PRN  pantoprazole (PROTONIX) injection 40 mg, Daily  diazePAM (VALIUM) injection 2.5 mg, Q4H PRN  sodium chloride flush 0.9 % injection 5-40 mL, 2 times per day  sodium chloride flush 0.9 % injection 5-40 mL, PRN  acetaminophen (TYLENOL) suppository 650 mg, Q6H PRN  morphine injection 4 mg, Q3H PRN      Objective:  /71   Pulse 72   Temp 97.7 °F (36.5 °C) (Axillary)   Resp 16   Ht 5' 3\" (1.6 m)   Wt 119 lb 1.6 oz (54 kg)   SpO2 96%   BMI 21.10 kg/m²   No intake or output data in the 24 hours ending 11/26/22 1322     Wt Readings from Last 3 Encounters:   11/26/22 119 lb 1.6 oz (54 kg)   07/29/22 119 lb 3.2 oz (54.1 kg)   06/08/22 116 lb 9.6 oz (52.9 kg)       General appearance:  Appears comfortable, looks frail , alert and pleasant, up in chair   Eyes: Sclera clear. Pupils equal.  ENT: Moist oral mucosa. Trachea midline, no adenopathy. Cardiovascular: Regular rhythm, normal S1, S2. No murmur. No edema in lower extremities  Respiratory: Not using accessory muscles. Good inspiratory effort. Clear to auscultation bilaterally, no wheeze or crackles. GI: Abdomen soft and flat with active bowel sounds. Abd incision is well approximate and clean. Musculoskeletal: No cyanosis in digits, neck supple  Neurology: CN 2-12 grossly intact. No speech or motor deficits  Psych: Normal affect. Alert and oriented in time, place and person  Skin: Warm, dry, normal turgor    Labs and Tests:  CBC:   Recent Labs     11/24/22  0508 11/25/22  0628 11/26/22  0611   WBC 11.3* 12.6* 11.9*   HGB 9.9* 10.5* 9.6*    376 382       BMP:    Recent Labs     11/24/22  0508 11/25/22  0628 11/26/22  0611   * 135* 133*   K 3.2* 4.4 4.4    98* 99   CO2 23 22 21   BUN 12 9 7   CREATININE 0.6 0.6 <0.5*   GLUCOSE 77 66* 79       Hepatic:   No results for input(s): AST, ALT, ALB, BILITOT, ALKPHOS in the last 72 hours. 11/18/22  xray:  Mechanical obstruction at the distal 3rd of the small bowel. Concomitant   small bowel ileus. RECOMMENDATION:   An additional delay radiograph of lower abdomen and pelvis at 24 hours from   this exam 2 determine the exact level in the distal 3rd of the small bowel   where there is an obstruction. Problem List  Principal Problem:    SBO (small bowel obstruction) (HCC)  Resolved Problems:    * No resolved hospital problems. *       Assessment & Plan:   SBO:  POD # 7 from Exploratory Lap with ROBERTO with small bowel and cecum resection,  she is tolerating diet and working with therapy. Ambulation was encouraged.   surgery is following   Hypernatremia: resolved   Low K: resolved   Metabolic Encephalopathy: resolved   Leukocytosis noted. Encourage IS and out of bed , she is afebrile   blood pressure is improved. She was not taking any prescription meds prior to admission     Stable for d/c. Awaiting pre cert     Diet: ADULT ORAL NUTRITION SUPPLEMENT; Breakfast, Dinner; Clear Liquid Oral Supplement  ADULT DIET;  Regular  Code:Full Code  DVT PPX      WIL Mao CNP   11/26/2022 1:22 PM

## 2022-11-26 NOTE — PROGRESS NOTES
Patient tolerating intake, pain minimal, medicated per MAR. Patient with bowel movements, one time of incontinence. Ambulating with assistance. Awaiting precert for placement.

## 2022-11-26 NOTE — PROGRESS NOTES
Shift assessment completed. Routine vitals obtained. Scheduled medications given. Patient is asleep, alert and oriented to self, disoriented to place, time and situation. Respirations are easy and unlabored. Patient denies pain and does not appear to be in distress, resting comfortably at this time. Call light within reach. Fall precautions in place. No further needs expressed.

## 2022-11-27 PROCEDURE — 6360000002 HC RX W HCPCS: Performed by: SURGERY

## 2022-11-27 PROCEDURE — 1200000000 HC SEMI PRIVATE

## 2022-11-27 PROCEDURE — C9113 INJ PANTOPRAZOLE SODIUM, VIA: HCPCS | Performed by: SURGERY

## 2022-11-27 PROCEDURE — 2580000003 HC RX 258: Performed by: SURGERY

## 2022-11-27 PROCEDURE — 6370000000 HC RX 637 (ALT 250 FOR IP): Performed by: NURSE PRACTITIONER

## 2022-11-27 PROCEDURE — 6370000000 HC RX 637 (ALT 250 FOR IP): Performed by: PHYSICIAN ASSISTANT

## 2022-11-27 RX ADMIN — DIAZEPAM 2.5 MG: 5 INJECTION, SOLUTION INTRAMUSCULAR; INTRAVENOUS at 23:58

## 2022-11-27 RX ADMIN — ENOXAPARIN SODIUM 40 MG: 100 INJECTION SUBCUTANEOUS at 08:38

## 2022-11-27 RX ADMIN — Medication 10 ML: at 08:39

## 2022-11-27 RX ADMIN — ACETAMINOPHEN 1000 MG: 500 TABLET ORAL at 14:59

## 2022-11-27 RX ADMIN — ACETAMINOPHEN 1000 MG: 500 TABLET ORAL at 06:04

## 2022-11-27 RX ADMIN — Medication 10 ML: at 20:17

## 2022-11-27 RX ADMIN — PANTOPRAZOLE SODIUM 40 MG: 40 INJECTION, POWDER, FOR SOLUTION INTRAVENOUS at 08:38

## 2022-11-27 RX ADMIN — POTASSIUM BICARBONATE 50 MEQ: 977.5 TABLET, EFFERVESCENT ORAL at 08:39

## 2022-11-27 ASSESSMENT — PAIN SCALES - GENERAL
PAINLEVEL_OUTOF10: 0
PAINLEVEL_OUTOF10: 3
PAINLEVEL_OUTOF10: 0

## 2022-11-27 ASSESSMENT — PAIN DESCRIPTION - ORIENTATION: ORIENTATION: MID

## 2022-11-27 ASSESSMENT — PAIN DESCRIPTION - DESCRIPTORS: DESCRIPTORS: DISCOMFORT

## 2022-11-27 ASSESSMENT — PAIN DESCRIPTION - LOCATION: LOCATION: ABDOMEN

## 2022-11-27 NOTE — CARE COORDINATION
SW called and left a message for Nimesh in admissions at [facility]. Asked where we were with pt's precert. Asked her to call our case mgmt office tomorrow with an update.     Electronically signed by DEREK Lee, ROSALIO on 11/27/2022 at 1:28 PM

## 2022-11-27 NOTE — PLAN OF CARE
Problem: ABCDS Injury Assessment  Goal: Absence of physical injury  11/27/2022 2138 by Bernice Bowling RN  Outcome: Progressing  11/27/2022 0400 by HERBERT Maldonado  Outcome: Progressing     Problem: Pain  Goal: Verbalizes/displays adequate comfort level or baseline comfort level  11/27/2022 0927 by Bernice Bowling RN  Outcome: Progressing  Flowsheets (Taken 11/27/2022 1566)  Verbalizes/displays adequate comfort level or baseline comfort level: Encourage patient to monitor pain and request assistance  11/27/2022 0400 by HERBERT NEGRETE  Outcome: Progressing     Problem: Skin/Tissue Integrity  Goal: Absence of new skin breakdown  Description: 1. Monitor for areas of redness and/or skin breakdown  2. Assess vascular access sites hourly  3. Every 4-6 hours minimum:  Change oxygen saturation probe site  4. Every 4-6 hours:  If on nasal continuous positive airway pressure, respiratory therapy assess nares and determine need for appliance change or resting period. 11/27/2022 1352 by Bernice Bowling RN  Outcome: Progressing  11/27/2022 0400 by Dorothy Salinas  Outcome: Progressing     Problem: Safety - Adult  Goal: Free from fall injury  11/27/2022 0927 by Bernice Bowling RN  Outcome: Progressing  11/27/2022 0400 by Dorothy Salinas  Outcome: Progressing     Problem: Discharge Planning  Goal: Discharge to home or other facility with appropriate resources  11/27/2022 0927 by Bernice Bowling RN  Outcome: Progressing  Flowsheets (Taken 11/27/2022 0164)  Discharge to home or other facility with appropriate resources: Identify barriers to discharge with patient and caregiver  11/27/2022 0400 by HERBERT NEGRETE  Outcome: Progressing     Problem: Nutrition Deficit:  Goal: Optimize nutritional status  11/27/2022 0927 by Bernice Bowling RN  Outcome: Progressing  11/27/2022 0400 by HERBERT Maldonado  Outcome: Progressing     Pt. Resting in bed at this time. VSS. Denies pain. Took meds well.  Ate a little breakfast. Dressing to abd. Clean/dry/and intact. Pt. Cynthia [place] to call if needs arise. Call light in reach. Will monitor.

## 2022-11-27 NOTE — PROGRESS NOTES
[facility] PROGRESS NOTE    11/27/2022 8:13 AM        Name: Josh Ramirez . Admitted: 11/14/2022  Primary Care Provider: Zack Agustin MD (Tel: [phone])      Subjective:  . Seen  this am while resting in bed  No reports of pain     I spoke with the  in the [facility]. Expect d/c to [facility] on Monday as long as pre cert is approved.          Reviewed interval ancillary notes    Current Medications  potassium bicarbonate (K-LYTE) disintegrating tablet 50 mEq, BID  potassium chloride (KLOR-CON M) extended release tablet 40 mEq, PRN   Or  potassium bicarb-citric acid (EFFER-K) effervescent tablet 40 mEq, PRN   Or  potassium chloride 10 mEq/100 mL IVPB (Peripheral Line), PRN  acetaminophen (TYLENOL) tablet 650 mg, Q4H PRN  acetaminophen (TYLENOL) tablet 1,000 mg, 3 times per day  sodium chloride flush 0.9 % injection 5-40 mL, 2 times per day  sodium chloride flush 0.9 % injection 5-40 mL, PRN  0.9 % sodium chloride infusion, PRN  enoxaparin (LOVENOX) injection 40 mg, Daily  ondansetron (ZOFRAN-ODT) disintegrating tablet 4 mg, Q8H PRN   Or  ondansetron (ZOFRAN) injection 4 mg, Q6H PRN  phenol 1.4 % mouth spray 1 spray, Q2H PRN  morphine (PF) injection 2 mg, Q2H PRN   Or  morphine injection 4 mg, Q2H PRN  oxyCODONE (ROXICODONE) immediate release tablet 5 mg, Q4H PRN   Or  oxyCODONE (ROXICODONE) immediate release tablet 10 mg, Q4H PRN  pantoprazole (PROTONIX) injection 40 mg, Daily  diazePAM (VALIUM) injection 2.5 mg, Q4H PRN  sodium chloride flush 0.9 % injection 5-40 mL, 2 times per day  sodium chloride flush 0.9 % injection 5-40 mL, PRN  acetaminophen (TYLENOL) suppository 650 mg, Q6H PRN  morphine injection 4 mg, Q3H PRN      Objective:  BP (!) 145/75   Pulse 71   Temp 97.7 °F (36.5 °C) (Oral)   Resp 18   Ht 5' 3\" (1.6 m)   Wt 109 lb 3.2 oz (49.5 kg)   SpO2 97%   BMI 19.34 kg/m²     Intake/Output Summary (Last 24 hours) at 11/27/2022 0813  Last data filed at 11/26/2022 1631  Gross per 24 hour   Intake 600 ml   Output --   Net 600 ml        Wt Readings from Last 3 Encounters:   11/27/22 109 lb 3.2 oz (49.5 kg)   07/29/22 119 lb 3.2 oz (54.1 kg)   06/08/22 116 lb 9.6 oz (52.9 kg)       General appearance:  Appears comfortable, looks frail ,  Eyes: Sclera clear. Pupils equal.  ENT: Moist oral mucosa. Trachea midline, no adenopathy. Cardiovascular: Regular rhythm, normal S1, S2. No murmur. No edema in lower extremities  Respiratory: Not using accessory muscles. Good inspiratory effort. Clear to auscultation bilaterally, no wheeze or crackles. GI: Abdomen soft and flat with active bowel sounds. Abd incision is well approximate and clean. Dressing is CDI    Musculoskeletal: No cyanosis in digits, neck supple  Neurology: CN 2-12 grossly intact. No speech or motor deficits  Psych: Normal affect. Alert and oriented in time, place and person  Skin: Warm, dry, normal turgor    Labs and Tests:  CBC:   Recent Labs     11/25/22  0628 11/26/22  0611   WBC 12.6* 11.9*   HGB 10.5* 9.6*    382     BMP:    Recent Labs     11/25/22  0628 11/26/22  0611   * 133*   K 4.4 4.4   CL 98* 99   CO2 22 21   BUN 9 7   CREATININE 0.6 <0.5*   GLUCOSE 66* 79       Hepatic:   No results for input(s): AST, ALT, ALB, BILITOT, ALKPHOS in the last 72 hours. 11/18/22  xray:  Mechanical obstruction at the distal 3rd of the small bowel. Concomitant   small bowel ileus. RECOMMENDATION:   An additional delay radiograph of lower abdomen and pelvis at 24 hours from   this exam 2 determine the exact level in the distal 3rd of the small bowel   where there is an obstruction. Problem List  Principal Problem:    SBO (small bowel obstruction) (HCC)  Resolved Problems:    * No resolved hospital problems.  *       Assessment & Plan:   SBO:  POD # 8 from Exploratory Lap with ROBERTO with small bowel and cecum resection,  she is tolerating diet and working with therapy. Ambulation was encouraged. surgery is following   Hypernatremia: resolved   Low K: resolved   Metabolic Encephalopathy: resolved   Leukocytosis noted. Encourage IS and out of bed , she is afebrile   blood pressure is improved. She was not taking any prescription meds prior to admission     Stable for d/c. Awaiting pre cert     Diet: ADULT ORAL NUTRITION SUPPLEMENT; Breakfast, Dinner; Clear Liquid Oral Supplement  ADULT DIET;  Regular  Code:Full Code  DVT PPX      WIL Scott CNP   11/27/2022 8:13 AM

## 2022-11-28 VITALS
OXYGEN SATURATION: 97 % | HEART RATE: 84 BPM | WEIGHT: 106.5 LBS | RESPIRATION RATE: 16 BRPM | BODY MASS INDEX: 18.87 KG/M2 | TEMPERATURE: 97.5 F | SYSTOLIC BLOOD PRESSURE: 126 MMHG | DIASTOLIC BLOOD PRESSURE: 76 MMHG | HEIGHT: 63 IN

## 2022-11-28 LAB
ALBUMIN SERPL-MCNC: 2.6 G/DL (ref 3.4–5)
ANION GAP SERPL CALCULATED.3IONS-SCNC: 13 MMOL/L (ref 3–16)
BASOPHILS ABSOLUTE: 0.1 K/UL (ref 0–0.2)
BASOPHILS RELATIVE PERCENT: 0.5 %
BUN BLDV-MCNC: 6 MG/DL (ref 7–20)
CALCIUM SERPL-MCNC: 8.5 MG/DL (ref 8.3–10.6)
CHLORIDE BLD-SCNC: 101 MMOL/L (ref 99–110)
CO2: 21 MMOL/L (ref 21–32)
CREAT SERPL-MCNC: <0.5 MG/DL (ref 0.6–1.2)
EOSINOPHILS ABSOLUTE: 0.1 K/UL (ref 0–0.6)
EOSINOPHILS RELATIVE PERCENT: 0.8 %
GFR SERPL CREATININE-BSD FRML MDRD: >60 ML/MIN/{1.73_M2}
GLUCOSE BLD-MCNC: 82 MG/DL (ref 70–99)
HCT VFR BLD CALC: 32.8 % (ref 36–48)
HEMOGLOBIN: 11 G/DL (ref 12–16)
LYMPHOCYTES ABSOLUTE: 1.1 K/UL (ref 1–5.1)
LYMPHOCYTES RELATIVE PERCENT: 8.9 %
MCH RBC QN AUTO: 31.4 PG (ref 26–34)
MCHC RBC AUTO-ENTMCNC: 33.6 G/DL (ref 31–36)
MCV RBC AUTO: 93.5 FL (ref 80–100)
MONOCYTES ABSOLUTE: 0.6 K/UL (ref 0–1.3)
MONOCYTES RELATIVE PERCENT: 4.9 %
NEUTROPHILS ABSOLUTE: 10.4 K/UL (ref 1.7–7.7)
NEUTROPHILS RELATIVE PERCENT: 84.9 %
PDW BLD-RTO: 13.6 % (ref 12.4–15.4)
PHOSPHORUS: 2.5 MG/DL (ref 2.5–4.9)
PLATELET # BLD: 519 K/UL (ref 135–450)
PMV BLD AUTO: 8 FL (ref 5–10.5)
POTASSIUM SERPL-SCNC: 4.1 MMOL/L (ref 3.5–5.1)
RBC # BLD: 3.51 M/UL (ref 4–5.2)
SODIUM BLD-SCNC: 135 MMOL/L (ref 136–145)
WBC # BLD: 12.2 K/UL (ref 4–11)

## 2022-11-28 PROCEDURE — 36415 COLL VENOUS BLD VENIPUNCTURE: CPT

## 2022-11-28 PROCEDURE — 6360000002 HC RX W HCPCS: Performed by: SURGERY

## 2022-11-28 PROCEDURE — 99024 POSTOP FOLLOW-UP VISIT: CPT | Performed by: SURGERY

## 2022-11-28 PROCEDURE — 2580000003 HC RX 258: Performed by: SURGERY

## 2022-11-28 PROCEDURE — 80069 RENAL FUNCTION PANEL: CPT

## 2022-11-28 PROCEDURE — 97530 THERAPEUTIC ACTIVITIES: CPT

## 2022-11-28 PROCEDURE — 6370000000 HC RX 637 (ALT 250 FOR IP): Performed by: PHYSICIAN ASSISTANT

## 2022-11-28 PROCEDURE — 85025 COMPLETE CBC W/AUTO DIFF WBC: CPT

## 2022-11-28 PROCEDURE — 97116 GAIT TRAINING THERAPY: CPT

## 2022-11-28 PROCEDURE — C9113 INJ PANTOPRAZOLE SODIUM, VIA: HCPCS | Performed by: SURGERY

## 2022-11-28 RX ORDER — AMOXICILLIN AND CLAVULANATE POTASSIUM 875; 125 MG/1; MG/1
1 TABLET, FILM COATED ORAL 2 TIMES DAILY
Qty: 14 TABLET | Refills: 0 | Status: SHIPPED | OUTPATIENT
Start: 2022-11-28 | End: 2022-12-05

## 2022-11-28 RX ADMIN — Medication 10 ML: at 15:22

## 2022-11-28 RX ADMIN — ENOXAPARIN SODIUM 40 MG: 100 INJECTION SUBCUTANEOUS at 08:43

## 2022-11-28 RX ADMIN — MORPHINE SULFATE 2 MG: 2 INJECTION, SOLUTION INTRAMUSCULAR; INTRAVENOUS at 06:29

## 2022-11-28 RX ADMIN — Medication 10 ML: at 08:43

## 2022-11-28 RX ADMIN — PANTOPRAZOLE SODIUM 40 MG: 40 INJECTION, POWDER, FOR SOLUTION INTRAVENOUS at 08:43

## 2022-11-28 RX ADMIN — ACETAMINOPHEN 1000 MG: 500 TABLET ORAL at 13:27

## 2022-11-28 RX ADMIN — MORPHINE SULFATE 2 MG: 2 INJECTION, SOLUTION INTRAMUSCULAR; INTRAVENOUS at 18:40

## 2022-11-28 ASSESSMENT — PAIN DESCRIPTION - FREQUENCY
FREQUENCY: CONTINUOUS
FREQUENCY: CONTINUOUS

## 2022-11-28 ASSESSMENT — PAIN SCALES - GENERAL
PAINLEVEL_OUTOF10: 6
PAINLEVEL_OUTOF10: 5
PAINLEVEL_OUTOF10: 8

## 2022-11-28 ASSESSMENT — PAIN DESCRIPTION - ORIENTATION
ORIENTATION: MID

## 2022-11-28 ASSESSMENT — PAIN DESCRIPTION - DESCRIPTORS
DESCRIPTORS: SHOOTING
DESCRIPTORS: PRESSURE
DESCRIPTORS: ACHING;DISCOMFORT

## 2022-11-28 ASSESSMENT — PAIN DESCRIPTION - ONSET
ONSET: ON-GOING
ONSET: ON-GOING

## 2022-11-28 ASSESSMENT — PAIN DESCRIPTION - PAIN TYPE
TYPE: SURGICAL PAIN
TYPE: SURGICAL PAIN

## 2022-11-28 ASSESSMENT — PAIN DESCRIPTION - LOCATION
LOCATION: ABDOMEN

## 2022-11-28 NOTE — PLAN OF CARE
Problem: ABCDS Injury Assessment  Goal: Absence of physical injury  Outcome: Progressing  Flowsheets (Taken 11/28/2022 0809)  Absence of Physical Injury: Implement safety measures based on patient assessment     Problem: Pain  Goal: Verbalizes/displays adequate comfort level or baseline comfort level  Outcome: Progressing  Flowsheets  Taken 11/28/2022 0745 by Jayy Rodriguez RN  Verbalizes/displays adequate comfort level or baseline comfort level: Assess pain using appropriate pain scale  Taken 11/28/2022 0616 by Jimbo Kasper RN  Verbalizes/displays adequate comfort level or baseline comfort level: Assess pain using appropriate pain scale  Taken 11/27/2022 1945 by Jimbo Kasper RN  Verbalizes/displays adequate comfort level or baseline comfort level: Encourage patient to monitor pain and request assistance     Problem: Skin/Tissue Integrity  Goal: Absence of new skin breakdown  Description: 1. Monitor for areas of redness and/or skin breakdown  2. Assess vascular access sites hourly  3. Every 4-6 hours minimum:  Change oxygen saturation probe site  4. Every 4-6 hours:  If on nasal continuous positive airway pressure, respiratory therapy assess nares and determine need for appliance change or resting period.   Outcome: Progressing     Problem: Safety - Adult  Goal: Free from fall injury  Outcome: Progressing  Flowsheets (Taken 11/28/2022 0809)  Free From Fall Injury: Instruct family/caregiver on patient safety     Problem: Discharge Planning  Goal: Discharge to home or other facility with appropriate resources  Outcome: Progressing  Flowsheets  Taken 11/28/2022 0807 by Jayy Rodriguez RN  Discharge to home or other facility with appropriate resources: Identify barriers to discharge with patient and caregiver  Taken 11/27/2022 1959 by Jimbo Kasper RN  Discharge to home or other facility with appropriate resources: Identify barriers to discharge with patient and caregiver     Problem: Nutrition Deficit:  Goal: Optimize nutritional status  Outcome: Progressing

## 2022-11-28 NOTE — CARE COORDINATION
CM received call from [employer] from [facility] stating that she is restarting the precert today.     Electronically signed by Martha Reeder RN on 11/28/2022 at 11:22 AM

## 2022-11-28 NOTE — PROGRESS NOTES
[facility] Department   Phone: [phone]    Physical Therapy    [] Initial Evaluation            [x] Daily Treatment Note         [] Discharge Summary      Patient: Antoinette Riggs   : 1940   MRN: [id number]   Date of Service:  2022  Admitting Diagnosis: SBO (small bowel obstruction) ([address]  Current Admission Summary: Pt admitted with partial SBO. General surgery consulted. Conservative management with NG tube failed. S/p exploratory laparotomy with small bowel and ileal resection 22. Past Medical History:  has a past medical history of Age-related osteoporosis without current pathological fracture, Arthritis, and Osteoporosis. Past Surgical History:  has a past surgical history that includes other surgical history (); Hysterectomy (); Vein Surgery (Right); Colonoscopy; eye surgery (Right); skin biopsy; and laparotomy (N/A, 2022). Discharge Recommendations: Antoinette Riggs scored a 18/24 on the AM-PAC short mobility form. Current research shows that an AM-PAC score of 17 or less is typically not associated with a discharge to the patient's home setting. Based on the patient's AM-PAC score and their current functional mobility deficits, it is recommended that the patient have 3-5 sessions per week of Physical Therapy at d/c to increase the patient's independence. Please see assessment section for further patient specific details. If patient discharges prior to next session this note will serve as a discharge summary. Please see below for the latest assessment towards goals.       DME Required For Discharge: DME to be determined at next level of care  Precautions/Restrictions: high fall risk  Weight Bearing Restrictions: no restrictions  Required Braces/Orthotics: no braces required  Positional Restrictions:no positional restrictions    Pre-Admission Information   Lives With: spouse                  Type of Home: house  Home Layout: one level  Home Access: level entry  Bathroom Layout: tub/shower unit  Bathroom Equipment:  no prior equipment  Toilet Height: standard height, elevated height  Home Equipment: no prior equipment  Transfer Assistance: Independent without use of device  Ambulation Assistance:Independent without use of device  ADL Assistance: independent with all ADL's  IADL Assistance:  shares all home making responsibilities with spouse  Active :        [x] Yes                 [] No  Hand Dominance: [] Left                 [x] Right  Current Employment: retired. 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 Hobbies: \"working with other people\"  Recent Falls: denies recent falls. Examination   Vision:   Vision Gross Assessment: Impaired and Vision Corrective Device: wears glasses at all times  Hearing:   [employer]      Subjective  General: Pt supine in bed upon arrival. Pt was agreeable to therapy with encouragement. Pain: 0/10  Pain Interventions: not applicable     Functional Mobility  Bed Mobility  Supine to Sit: SBA  Sit to Supine: SBA  Scooting: SBA  Comments: HOB raised  Transfers  Sit to stand transfer: contact guard assistance  Stand to sit transfer: contact guard assistance   Comments: RW used for transfers to maintain balance. Ambulation  Surface:level surface  Assistive Device: rolling walker  Assistance: contact guard assistance  Distance: 150'  Gait Mechanics: decreased gait speed, shuffling pattern with decreased step height, slight trunk flexion throughout the gait cycle, steady with no losses of balance   Comments: Pt was able to significantly increase ambulation from previous sessions. Pt expressed fatigue upon returning to room. Stair Mobility  Stair mobility not completed on this date. Comments:  Wheelchair Mobility:  No w/c mobility completed on this date.   Comments:  Balance  Static Sitting Balance: fair (-): maintains balance at SBA with use of UE support  Dynamic Sitting Balance: fair (-): maintains balance at SBA with use of UE support  Static Standing Balance: fair (-): maintains balance at CGA with use of UE support  Dynamic Standing Balance: fair (-): maintains balance at CGA with use of UE support  Comments: Pt had no apparent sway or LOB during transfers or ambulation but during brief period of unsupported standing she was slightly unsteady. Other Therapeutic Interventions    Functional Outcomes  AM-PAC Inpatient Mobility Raw Score : 18              Cognition  Overall Cognitive Status: Impaired  Problem Solving: assistance required to generate solutions, assistance required to implement solutions, assistance required to identify errors made, assistance required to correct errors made  Insights: decreased awareness of deficits  Initiation: requires cues for some  Sequencing: requires cues for some  Command Following:   [facility]    Education  Barriers To Learning: cognition  Patient Education: patient educated on goals, PT role and benefits, general safety, functional mobility training, proper use of assistive device/equipment, family education, transfer training, discharge recommendations  Learning Assessment:  patient verbalizes understanding, would benefit from continued reinforcement, spouse verbalizes understanding    Assessment  Activity Tolerance: Pt tolerated the PT treatment session well with no significant limitations. Impairments Requiring Therapeutic Intervention: decreased functional mobility, decreased strength, decreased cognition, decreased endurance, decreased balance  Prognosis: good  Clinical Assessment: Pt presents today with improvement in functional mobility needing no physical assistance. Pt was able to increase her ambulation distance with use of a RW and CGA. Pt continues to have decreased endurance and fatiguing quickly. Pt will continue to benefit from skilled PT to facilitate return to PLOF and to promote independence.   Safety Interventions: patient left in bed, bed alarm in place, call light within reach, gait belt, nurse notified, and family/caregiver present    Plan  Frequency: 3-5 x/per week  Current Treatment Recommendations: strengthening, balance training, functional mobility training, transfer training, gait training, endurance training, neuromuscular re-education, modalities, patient/caregiver education, home exercise program, safety education, equipment evaluation/education, and positioning    Goals  Patient Goals: pt did not state   Short Term Goals:  Time Frame: upon d/c  Short term goal 1: Pt will perform bed mobility with supervision  Short term goal 2: Pt will perform transfers with LRAD and SBA  Short term goal 3: Pt will ambulate 22' with LRAD and CGA -Goal Met 11/28   *1 goal met 11/28/22    Patient will ambulate x 150' without use of AD-- Goal added 11/28    Therapy Session Time      Individual Group Co-treatment   Time In 1450       Time Out 1513       Minutes 23         Timed Code Treatment Minutes: 23 Minutes  Total Treatment Minutes: 23 Minutes      WILNER Huerta  Electronically Signed By: Yon Henning PT    Therapist was present, directed the patient's care, made skilled judgement, and was responsible for assessment and treatment of the patient.   Co-signed and supervised by: Yon Henning PT, DPT [id number]

## 2022-11-28 NOTE — PROGRESS NOTES
Pt alert ,confused,had diarrhea in the beginning of the shift ,was changed,vitals signs done within normal limits. Head to toe assessment done,pt currently in bed with call light within reach,bed alarm on,camera in the room. will continue to monitor.

## 2022-11-28 NOTE — CARE COORDINATION
Discharge note:      CM/SW has been notified of discharge. Patient noted to have the following needs at discharge. CM/SW has coordinated the following services:  [facility]  [place], [address]  Phone: [phone]  Fax: [phone]        RN to call report to [phone]  CM will fax OBEY/AVS  CM placed call to [email or web address] to update on time of discharge   HENS complete        Transportation: [address] will transport at 7:15 pm today        Comment:  CM presented patient with  IMM (Important Message from Medicare) letter prior to discharge (w/in 48 hrs of dc). CM explained the right to appeal discharge and the process to follow. All questions answered. Copy of IMM provided to pt and pt signed CM copy. Date and time of signed IMM documented and placed on soft chart. All CM/SW needs met, will sign off.    Electronically signed by Dania Cerda RN on 11/28/2022 at 2:28 PM

## 2022-11-28 NOTE — DISCHARGE SUMMARY
[facility] DISCHARGE SUMMARY    Patient Demographics    PatientBrooke Saleh  Date of Birth. 1940  MRN. [id number]     Primary care provider. Roland Howlel MD  (Tel: [phone])    Admit date: 11/14/2022    Discharge date 11/28/2022  Note Date: 11/28/2022     Reason for Hospitalization. Chief Complaint   Patient presents with    Abdominal Pain     Pt came in with c/o L sided abdominal pain that started around 11am. She is also c/o nausea. Significant Findings. Principal Problem:    SBO (small bowel obstruction) ([address]  Resolved Problems:    * No resolved hospital problems. *       Problems and results from this hospitalization that need follow up. Follow up with surgery in 1 week     Significant test results and incidental findings. 11/18/22  xray:  Mechanical obstruction at the distal 3rd of the small bowel. Concomitant   small bowel ileus. RECOMMENDATION:   An additional delay radiograph of lower abdomen and pelvis at 24 hours from   this exam 2 determine the exact level in the distal 3rd of the small bowel   where there is an obstruction. Invasive procedures and treatments. 11/19/22 Exploratory Lap with ROBERTO with ileocolonic resection     Problem-based Hospital Course. The patient was admitted from home with abdominal pain. She was followed by [facility] and taken to the OR for SBO with Exploratory Lap with ROBERTO with cecum resection. Post operative course was uneventful. She was d/c to [facility] on her 6th post operative day. She initially had some encephalopathy, however this quickly resolved  Her K was Low and responded to oral therapy. She was tolerating a diet and having BM's prior to  her d/c to ECF   IS was encouraged.   She was afebrile at d/c   PO intake was encouraged ,  supplements encouraged   On the day of d/c she had some mild redness along the incision with scant amount purulence. Dr Nallely Ramirez requested augmentin for 7 days at d/c     Consults. IP CONSULT TO GENERAL SURGERY    Physical examination on discharge day. /70   Pulse 80   Temp 98 °F (36.7 °C) (Oral)   Resp 16   Ht 5' 3\" (1.6 m)   Wt 106 lb 8 oz (48.3 kg)   SpO2 94%   BMI 18.87 kg/m²   General appearance. Alert. Looks comfortable but frail , alert and pleasant   HEENT. Sclera clear. Moist mucus membranes. Cardiovascular. Regular rate and rhythm, normal S1, S2. No murmur. Respiratory. Not using accessory muscles. Clear to auscultation bilaterally, no wheeze. Gastrointestinal. Abdomen soft with active bowel sounds. Operative incision well approximated and clean   Neurology. Facial symmetry. No speech deficits. Moving all extremities equally. Extremities. No edema in lower extremities. Skin. Warm, dry, normal turgor    Condition at time of discharge stable     Medication instructions provided to patient at discharge. Medication List        START taking these medications      amoxicillin-clavulanate 875-125 MG per tablet  Commonly known as: AUGMENTIN  Take 1 tablet by mouth 2 times daily for 7 days     oxyCODONE 5 MG immediate release tablet  Commonly known as: ROXICODONE  Take 1 tablet by mouth every 8 hours as needed for Pain for up to 3 days.             CONTINUE taking these medications      alendronate 10 MG tablet  Commonly known as: FOSAMAX     CALCIUM CITRATE-VITAMIN D PO     estradiol 0.1 MG/GM vaginal cream  Commonly known as: ESTRACE     VITAMIN D3 PO               Where to Get Your Medications        These medications were sent to [facility], [address] [place] [address]   [facility], [address] [place] [address]      Phone: [phone]   amoxicillin-clavulanate 875-125 MG per tablet       You can get these medications from any pharmacy    Bring a paper prescription for each of these medications  oxyCODONE 5 MG immediate release tablet         Discharge recommendations given to patient. Follow Up. in 1 week   Disposition. SNF  Activity. activity as tolerated  Diet: ADULT ORAL NUTRITION SUPPLEMENT; Breakfast, Dinner; Clear Liquid Oral Supplement  ADULT DIET; Regular      Spent 35 minutes in discharge process.     Signed:  WIL Wang CNP     11/28/2022 7:40 AM

## 2022-11-28 NOTE — PROGRESS NOTES
.Shift assessment completed. Routine vitals stable. Scheduled medications given. Patient is awake, alert and confused. Patient assisted out of bed into chair to eat her breakfast, tolerated well. Respirations are easy and unlabored. Patient does not appear to be in distress. Call light within reach.   Fall precautions in place

## 2022-11-28 NOTE — PROGRESS NOTES
Attempted x 3 to call [facility] to give report and no answer. Will try again before patient leaves    Called report to [place] at [facility].

## 2022-11-28 NOTE — PROGRESS NOTES
[facility]  Progress Note    Pt Name: Michael Donahue  MRN: [id number]  [place]: 1940  Date of Evaluation: 2022    Subjective:    No acute events overnight  Pain controlled  Tolerating diet  Passing stool    Vital Signs:  Patient Vitals for the past 24 hrs:   BP Temp Temp src Pulse Resp SpO2 Weight   22 1105 116/70 98 °F (36.7 °C) Oral 80 -- 94 % --   22 0745 132/71 98.2 °F (36.8 °C) Oral 78 16 94 % --   22 0616 (!) 146/74 98.2 °F (36.8 °C) Oral 81 18 95 % 106 lb 8 oz (48.3 kg)   22 2330 (!) 144/74 97.7 °F (36.5 °C) Axillary 72 18 96 % --   22 1945 133/71 98.2 °F (36.8 °C) Oral 77 18 96 % --   22 1546 110/61 98.1 °F (36.7 °C) Oral 69 18 97 % --      TEMPERATURE HISTORY 24H: Temp (24hrs), Av.1 °F (36.7 °C), Min:97.7 °F (36.5 °C), Max:98.2 °F (36.8 °C)    BLOOD PRESSURE HISTORY: Systolic (00QUJ), OYB:142 , Min:110 , DGR:245    Diastolic (32OOT), ASB:83, Min:61, Max:75      Intake/Output:    I/O last 3 completed shifts:   In: 480 [P.O.:480]  Out: 0   I/O this shift:  In: 120 [P.O.:120]  Out: -   Drain/tube Output:    [REMOVED] Closed/Suction Drain Superior Abdomen Bulb-Output (ml): 20 ml  Negative Pressure Wound Therapy Abdomen Medial-Output (ml): 0 ml      Physical Exam:  General: awake, alert, no acute distress, confused at baseline  Abdomen: soft, non-distended, appropriate incisional tenderness to palpation, incision with small amount of erythema at lower aspect of wound, 2 staples removed and large amount of purulence released, fascia intact    Labs:  CBC:    Recent Labs     22  0611 22  05   WBC 11.9* 12.2*   HGB 9.6* 11.0*   HCT 29.6* 32.8*    519*     BMP:    Recent Labs     22   * 135*   K 4.4 4.1   CL 99 101   CO2 21 21   BUN 7 6*   CREATININE <0.5* <0.5*   GLUCOSE 79 82     Hepatic:   No results for input(s): AST, ALT, ALB, BILITOT, ALKPHOS in the last 72 hours. Amylase: No results for input(s): AMYLASE in the last 72 hours. Lipase:   No results for input(s): LIPASE in the last 72 hours. Mag:      No results for input(s): MG in the last 72 hours. Phos:     Recent Labs     11/28/22  0517   PHOS 2.5      Coags:   No results for input(s): INR, APTT in the last 72 hours. Cultures:  Relevant cultures documented under results section     Pathology:   Pathology results pending     Imaging:  I have personally reviewed the following films:    XR ABDOMEN (2 VIEWS)    Result Date: 11/18/2022  EXAMINATION: TWO XRAY VIEWS OF THE ABDOMEN 11/18/2022 10:45 am COMPARISON: None. HISTORY: ORDERING SYSTEM PROVIDED HISTORY: F/u SBO TECHNOLOGIST PROVIDED HISTORY: Reason for exam:->F/u SBO Reason for Exam: abdominal pain FINDINGS: This study is done at 48 hours after initiation of the small bowel opacification procedure peer there is an NG tube looped/coiled in the proximal stomach and the tip of this 2 is in the mid body of the stomach. The stomach is nondistended and contains no positive contrast material. There is water-soluble contrast in the slightly dilated proximal small bowel. The maximal opacified small bowel diameter is 3.8 cm. There is no opacification of the intestinal tract distal to the middle 3rd of the small bowel. There is no large bowel dilatation. Mechanical obstruction at the distal 3rd of the small bowel. Concomitant small bowel ileus. RECOMMENDATION: An additional delay radiograph of lower abdomen and pelvis at 24 hours from this exam 2 determine the exact level in the distal 3rd of the small bowel where there is an obstruction.       Scheduled Meds:   acetaminophen  1,000 mg Oral 3 times per day    sodium chloride flush  5-40 mL IntraVENous 2 times per day    enoxaparin  40 mg SubCUTAneous Daily    pantoprazole  40 mg IntraVENous Daily    sodium chloride flush  5-40 mL IntraVENous 2 times per day     Continuous Infusions:   sodium chloride 5 mL/hr at 22 0110     PRN Meds:.potassium chloride **OR** potassium alternative oral replacement **OR** potassium chloride, acetaminophen, sodium chloride flush, sodium chloride, ondansetron **OR** ondansetron, phenol, morphine **OR** morphine, oxyCODONE **OR** oxyCODONE, diazePAM, sodium chloride flush, [DISCONTINUED] acetaminophen **OR** acetaminophen, [DISCONTINUED] morphine **OR** morphine      Assessment:  Patient Active Problem List   Diagnosis    Age-related osteoporosis without current pathological fracture    White coat syndrome without hypertension    SBO (small bowel obstruction) ([facility] [address]     Ms. Heidy Perez is a 79 y.o. female who presents with   OR Date 2022, exploratory laparotomy, lysis of adhesions, small bowel and cecum resection with anastomosis for small bowel obstruction    Plan:  1. Pain controlled, minimize narcotics  2. Monitor bowel function, passing stool  3. Tolerating diet, encourage PO intake, nutrition critical for wound healing  4. Monitor and replace electrolytes as needed  5. Increase activity as able, PT/OT  6. Antibiotics for wound, send new culture, switch to PO at discharge  7. Defer management of remainder of medical comorbidities to primary and consulting teams  8. Discharge planning, pre-cert  and restarted    Ki Ryder MD, FACS  2022  12:58 PM

## 2022-11-29 NOTE — PROGRESS NOTES
[facility]. This patient has had a discharge order placed. [facility] by [facility]. Discharge paperwork has been printed and faxed by OBEY MALDONADO completed by this RN. no further needs at this time. IV has been removed with no complications. Telemetry has been removed. Pt has all belongings present. Report has been called to [facility] and all questions have been answered.

## 2022-11-29 NOTE — PLAN OF CARE
Problem: ABCDS Injury Assessment  Goal: Absence of physical injury  11/28/2022 1939 by Lauriat Guan RN  Outcome: Completed  11/28/2022 0811 by Laurita Guan RN  Outcome: Progressing  Flowsheets (Taken 11/28/2022 0809)  Absence of Physical Injury: Implement safety measures based on patient assessment     Problem: Pain  Goal: Verbalizes/displays adequate comfort level or baseline comfort level  11/28/2022 1940 by Laurita Guan RN  Outcome: Completed  11/28/2022 0811 by Laurita Guan RN  Outcome: Progressing  Flowsheets  Taken 11/28/2022 0745 by Laurita Guan RN  Verbalizes/displays adequate comfort level or baseline comfort level: Assess pain using appropriate pain scale  Taken 11/28/2022 0616 by Bryant Brush RN  Verbalizes/displays adequate comfort level or baseline comfort level: Assess pain using appropriate pain scale  Taken 11/27/2022 1945 by Bryant Brush RN  Verbalizes/displays adequate comfort level or baseline comfort level: Encourage patient to monitor pain and request assistance     Problem: Skin/Tissue Integrity  Goal: Absence of new skin breakdown  Description: 1. Monitor for areas of redness and/or skin breakdown  2. Assess vascular access sites hourly  3. Every 4-6 hours minimum:  Change oxygen saturation probe site  4. Every 4-6 hours:  If on nasal continuous positive airway pressure, respiratory therapy assess nares and determine need for appliance change or resting period.   11/28/2022 1939 by Laurita Guan RN  Outcome: Completed  11/28/2022 0811 by Laurita Guan RN  Outcome: Progressing     Problem: Safety - Adult  Goal: Free from fall injury  11/28/2022 1940 by Laurita Guan RN  Outcome: Completed  11/28/2022 0811 by Laurita Guan RN  Outcome: Progressing  Flowsheets (Taken 11/28/2022 0809)  Free From Fall Injury: Instruct family/caregiver on patient safety     Problem: Discharge Planning  Goal: Discharge to home or other facility with appropriate resources  11/28/2022 1939 by Lionel Almazan RN  Outcome: Completed  11/28/2022 0811 by Lionel Almazan RN  Outcome: Progressing  Flowsheets  Taken 11/28/2022 0807 by Lionel Almazan RN  Discharge to home or other facility with appropriate resources: Identify barriers to discharge with patient and caregiver  Taken 11/27/2022 1959 by Gucci David RN  Discharge to home or other facility with appropriate resources: Identify barriers to discharge with patient and caregiver     Problem: Nutrition Deficit:  Goal: Optimize nutritional status  11/28/2022 1939 by Lionel Almazan RN  Outcome: Completed  11/28/2022 0811 by Lionel Almazan RN  Outcome: Progressing

## 2022-12-01 ENCOUNTER — TELEPHONE (OUTPATIENT)
Dept: SURGERY | Age: 82
End: 2022-12-01

## 2022-12-01 NOTE — TELEPHONE ENCOUNTER
Carmen Horner stated pt is on Augmentin. Asked if a wound cx was done she said no,  advised to stay on Augmentin and obtain wound c & s.

## 2022-12-01 NOTE — TELEPHONE ENCOUNTER
Phone call from Luis (nurse) at [facility],  pt had bowel surgery on 11/14/22. Pt arrived at [facility] 11/28/22. The wound NP saw pt today and says the open area of the wound looks and smells infected, no fever or other sx. NP is requesting an antibx be faxed to [phone] and the RX can be filled at [facility]. Pt has an appt here on 12/6.

## 2022-12-06 ENCOUNTER — OFFICE VISIT (OUTPATIENT)
Dept: SURGERY | Age: 82
End: 2022-12-06

## 2022-12-06 VITALS — DIASTOLIC BLOOD PRESSURE: 58 MMHG | SYSTOLIC BLOOD PRESSURE: 120 MMHG

## 2022-12-06 DIAGNOSIS — Z09 SURGERY FOLLOW-UP: Primary | ICD-10-CM

## 2022-12-06 PROCEDURE — 99024 POSTOP FOLLOW-UP VISIT: CPT | Performed by: SURGERY

## 2022-12-07 NOTE — PATIENT INSTRUCTIONS
1.  Encourage oral intake, nutrition critical for wound healing. Counseled on the importance of protein intake  2. Continue antibiotics as prescribed, does not need additional coverage  3. Continue local wound care, does not need incision drainage debridement or additional opening  4. No heavy lifting over 20lbs for 6 weeks total postop  5.   Return to office in 2 weeks for a wound and overall wellness check

## 2022-12-07 NOTE — PROGRESS NOTES
[facility]  SUBJECTIVE:    Stephen Aburto   1940   79 y.o. female presents for routine postoperative followup after OR Date 11/19/2022, exploratory laparotomy, lysis of adhesions, small bowel and cecum resection with anastomosis for small bowel obstruction. The patient has dementia but is able to answer some questions. She is accompanied by her  who is able to supplement history somewhat as well although he also is a poor historian. Since hospital discharge the patient has been eating poorly at her facility but says that she does not like the food other than scrambled eggs.   Bowels are normal.  Lower incisional wound is with less drainage tenderness and improving as well    Past Medical History:   Diagnosis Date    Age-related osteoporosis without current pathological fracture 11/05/2018    Arthritis     Osteoporosis      Past Surgical History:   Procedure Laterality Date    COLONOSCOPY      EYE SURGERY Right     cataract    HYSTERECTOMY (CERVIX STATUS UNKNOWN)  1984    LAPAROTOMY N/A 11/19/2022    EXPLORATORY LAPAROTOMY, LYSIS OF ADHESIONS,  BOWEL RESECTION performed by Jamir Medina MD at [address] [place] [address]  2006    melanoma removed     SKIN [facility]ector Right     Dr. Darwin Barbosa     Social History     Socioeconomic History    Marital status:      Spouse name: Efrain Avalos    Number of children: 1    Years of education: 12    Highest education level: Not on file   Occupational History    Not on file   Tobacco Use    Smoking status: Never    Smokeless tobacco: Never   Vaping Use    Vaping Use: Never used   Substance and Sexual Activity    Alcohol use: No    Drug use: Never    Sexual activity: Not Currently   Other Topics Concern    Not on file   Social History Narrative    Not on file     Social Determinants of Health     Financial Resource Strain: Not on file   Food Insecurity: Not on file   Transportation Needs: Not on file Physical Activity: Insufficiently Active    Days of Exercise per Week: 2 days    Minutes of Exercise per Session: 20 min   Stress: Not on file   Social Connections: Not on file   Intimate Partner Violence: Not on file   Housing Stability: Not on file      Family History   Problem Relation Age of Onset    Cancer Mother     Lung Cancer Father     Coronary Art Dis Brother      Current Outpatient Medications   Medication Sig Dispense Refill    alendronate (FOSAMAX) 10 MG tablet       Cholecalciferol (VITAMIN D3 PO) Take 1,000 Units by mouth daily      CALCIUM CITRATE-VITAMIN D PO Take 4 capsules by mouth daily 800 mg a day      estradiol (ESTRACE) 0.1 MG/GM vaginal cream Place 2 g vaginally Twice a Week       No current facility-administered medications for this visit. No Known Allergies     Review of Systems:  Review of systems performed and negative with the exception of the above findings    OBJECTIVE:  BP (!) 120/58      Physical Exam:  General appearance: alert, appears stated age, cooperative, and no distress  Abdomen: soft, non-distended, appropriate incisional tenderness, incision clean dry and intact except for small opening at lower aspect of wound, open with minimal drainage and cellulitis, staples removed and replaced with steristrips without incident    No results displayed because visit has over 200 results. XR ABDOMEN (KUB) (SINGLE AP VIEW)    Result Date: 11/17/2022  EXAMINATION: ONE SUPINE XRAY VIEW(S) OF THE ABDOMEN 11/17/2022 9:26 am COMPARISON: 11/16/2022 small-bowel follow-through 11/14/2022 CT abdomen and pelvis HISTORY: ORDERING SYSTEM PROVIDED HISTORY: SBO/ileus TECHNOLOGIST PROVIDED HISTORY: Reason for exam:->SBO/ileus Reason for Exam: SBO/ileus FINDINGS: Nasogastric tube remains intragastric, with portion coiled in the fundus and distal tip at the level of the gastric body. Contrast remains pooled in the gastric fundus.   There is dilute contrast in multiple loops of small bowel which remain dilated. No passage into the colon is identified. Distal small-bowel obstruction pattern. It would be helpful to position the patient right lateral decubitus, in order to encourage gastric emptying. CT ABDOMEN PELVIS W IV CONTRAST Additional Contrast? None    Result Date: 11/14/2022  EXAMINATION: CT OF THE ABDOMEN AND PELVIS WITH CONTRAST 11/14/2022 4:54 pm TECHNIQUE: CT of the abdomen and pelvis was performed with the administration of intravenous contrast. Multiplanar reformatted images are provided for review. Automated exposure control, iterative reconstruction, and/or weight based adjustment of the mA/kV was utilized to reduce the radiation dose to as low as reasonably achievable. COMPARISON: None. HISTORY: ORDERING SYSTEM PROVIDED HISTORY: Left sided abd pain x 5 hours. TECHNOLOGIST PROVIDED HISTORY: Reason for exam:->Left sided abd pain x 5 hours. Additional Contrast?->None Decision Support Exception - unselect if not a suspected or confirmed emergency medical condition->Emergency Medical Condition (MA) Reason for Exam: Left sided abd pain x 5 hours FINDINGS: Lower Chest: Bibasilar atelectasis. No pleural effusion or consolidation. Organs: The liver is normal.  The gallbladder is normal.  The spleen and pancreas are normal.  Adrenal glands are normal.  Simple cyst within the right kidney. No follow-up is recommended. No nephrolithiasis or hydronephrosis. GI/Bowel: The distal esophagus and stomach are normal.  There are multiple loops of mildly dilated, fluid-filled small bowel in the pelvis which appear to represent ileum. The loops measure up to 3 cm in diameter. No definite transition point is seen. There are scattered colonic diverticula without evidence of diverticulitis. No colonic wall thickening. No evidence of appendicitis. Pelvis: The urinary bladder is normal.  Status post hysterectomy. Peritoneum/Retroperitoneum: The aorta is normal caliber. No lymphadenopathy. Bones/Soft Tissues: No suspicious osseous lesions     1. Loops of mildly dilated fluid-filled small bowel in the pelvis which appear to represent ileum. No clear transition point is seen. Air is still seen distally in the colon and the proximal duodenal and jejunal loops do not appear dilated. This likely represents a partial small bowel obstruction. 2.  Colonic diverticulosis without evidence of diverticulitis. XR CHEST PORTABLE    Result Date: 11/14/2022  EXAMINATION: ONE XRAY VIEW OF THE CHEST 11/14/2022 4:27 pm COMPARISON: None. HISTORY: ORDERING SYSTEM PROVIDED HISTORY: L abd pain radiating to chest TECHNOLOGIST PROVIDED HISTORY: Reason for exam:->L abd pain radiating to chest Reason for Exam: L abd pain radiating to chest FINDINGS: No acute airspace infiltrate. No pneumothorax or pleural effusion. Normal cardiomediastinal silhouette     No acute cardiopulmonary disease     XR ABDOMEN FOR NG/OG/NE TUBE PLACEMENT    Result Date: 11/16/2022  EXAMINATION: ONE SUPINE XRAY VIEW(S) OF THE ABDOMEN 11/16/2022 3:52 am COMPARISON: 11/15/2022 HISTORY: ORDERING SYSTEM PROVIDED HISTORY: Confirmation of course of NG/OG/NE tube and location of tip of tube TECHNOLOGIST PROVIDED HISTORY: Reason for exam:->Confirmation of course of NG/OG/NE tube and location of tip of tube Portable? ->Yes Reason for Exam: Confirmation of course of NG/OG/NE tube and location of tip of tube FINDINGS: Nasogastric tube is coiled at the level of the proximal stomach and redirected cephalad into the esophagus, tip at the level of the carl. Cardiac silhouette is normal.  Right hemidiaphragm is elevated associated with atelectasis. Nonspecific bowel gas pattern is noted in the upper abdomen. No free air is identified. Looped configuration of nasogastric tube, coiled at the stomach and redirected to the midesophagus level. Withdrawal of 20 cm is suggested, followed by readvancement.      XR ABDOMEN FOR NG/OG/NE TUBE PLACEMENT    Result Date: 11/15/2022  EXAMINATION: ONE SUPINE XRAY VIEW(S) OF THE ABDOMEN 11/15/2022 8:52 am COMPARISON: 11/14/2022 HISTORY: ORDERING SYSTEM PROVIDED HISTORY: NG placement TECHNOLOGIST PROVIDED HISTORY: Reason for exam:->NG placement Portable? ->Yes Reason for Exam: NG placement FINDINGS: Nasogastric tube is coiled in the gastric fundus. Bowel gas pattern in the upper abdomen is unremarkable. Right hemidiaphragm is elevated. Mild left basilar atelectasis. Nasogastric tube projects in normal position. FL SMALL BOWEL FOLLOW THROUGH ONLY    Result Date: 11/17/2022  EXAMINATION: SMALL BOWEL FOLLOW THROUGH SERIES 11/16/2022 TECHNIQUE: Small bowel follow through series was performed with overhead images and spot images. FLUOROSCOPY DOSE AND TYPE OR TIME AND EXPOSURES: No fluoroscopy utilized. COMPARISON: None HISTORY: ORDERING SYSTEM PROVIDED HISTORY: SBO followup, water soluble contrast to be given through NG TECHNOLOGIST PROVIDED HISTORY: Reason for exam:->SBO followup, water soluble contrast to be given through NG Reason for Exam: SBO followup, water soluble contrast to be given through NG FINDINGS:  imaging of the abdomen shows multiple dilated loops of air-filled small bowel diffusely. Water-soluble contrast was administered through an enteric tube in the lumen of the stomach. Of note, the tube is coiled and side holes and tip extend back superiorly into the distal esophagus. As result, there is significant contrast within the esophagus on initial image. Subsequent images show concentration of contrast within the stomach. At 90 minutes, very minimal contrast has passed into the jejunum. At 2 hours 30 minutes, there is significant concentration of contrast within the stomach with slightly more contrast in the jejunum. Imaging was performed through 9 hours, at which time there is still significant concentration in the stomach with a dilute amount of contrast in the jejunum.      Small-bowel follow-through study showing significant retention of contrast in the lumen of the stomach and only a small amount of transit into the small bowel that is very dilute. Pattern may represent a partial small bowel obstruction. Gastroparesis or ileus may also be considered. XR ABDOMEN (2 VIEWS)    Result Date: 11/18/2022  EXAMINATION: TWO XRAY VIEWS OF THE ABDOMEN 11/18/2022 10:45 am COMPARISON: None. HISTORY: ORDERING SYSTEM PROVIDED HISTORY: F/u SBO TECHNOLOGIST PROVIDED HISTORY: Reason for exam:->F/u SBO Reason for Exam: abdominal pain FINDINGS: This study is done at 48 hours after initiation of the small bowel opacification procedure peer there is an NG tube looped/coiled in the proximal stomach and the tip of this 2 is in the mid body of the stomach. The stomach is nondistended and contains no positive contrast material. There is water-soluble contrast in the slightly dilated proximal small bowel. The maximal opacified small bowel diameter is 3.8 cm. There is no opacification of the intestinal tract distal to the middle 3rd of the small bowel. There is no large bowel dilatation. Mechanical obstruction at the distal 3rd of the small bowel. Concomitant small bowel ileus. RECOMMENDATION: An additional delay radiograph of lower abdomen and pelvis at 24 hours from this exam 2 determine the exact level in the distal 3rd of the small bowel where there is an obstruction. Assessment:  OR Date 11/19/2022, exploratory laparotomy, lysis of adhesions, small bowel and cecum resection with anastomosis for small bowel obstruction     Plan:  1. Encourage oral intake, nutrition critical for wound healing. Counseled on the importance of protein intake  2. Continue antibiotics as prescribed, does not need additional coverage  3. Continue local wound care, does not need incision drainage debridement or additional opening  4. No heavy lifting over 20lbs for 6 weeks total postop  5.   Return to office in 2 weeks for

## 2022-12-12 ENCOUNTER — TELEPHONE (OUTPATIENT)
Dept: SURGERY | Age: 82
End: 2022-12-12

## 2022-12-12 NOTE — TELEPHONE ENCOUNTER
Pt's home health nurse would like to know if pt can shower. Pt's wound is currently being packed and has some draining.  Please call the home nurse Randla Rob  [phone]

## 2022-12-16 ENCOUNTER — TELEPHONE (OUTPATIENT)
Dept: FAMILY MEDICINE CLINIC | Age: 82
End: 2022-12-16

## 2022-12-16 NOTE — TELEPHONE ENCOUNTER
Speech therapist called to let kyle know that pt was suppose to start speech therapy this week. Pt and family said that pt has been overwhelmed this week since pt  has had a swollen leg. Pt said she will start speech therapy next week once pt is feeling better.

## 2022-12-27 ENCOUNTER — OFFICE VISIT (OUTPATIENT)
Dept: SURGERY | Age: 82
End: 2022-12-27

## 2022-12-27 VITALS — BODY MASS INDEX: 18.78 KG/M2 | DIASTOLIC BLOOD PRESSURE: 60 MMHG | WEIGHT: 106 LBS | SYSTOLIC BLOOD PRESSURE: 120 MMHG

## 2022-12-27 DIAGNOSIS — Z09 SURGERY FOLLOW-UP: Primary | ICD-10-CM

## 2022-12-27 PROCEDURE — 99024 POSTOP FOLLOW-UP VISIT: CPT | Performed by: SURGERY

## 2022-12-27 NOTE — PROGRESS NOTES
[facility]  SUBJECTIVE:    Kiara Velasquez   1940   79 y.o. female presents for routine postoperative followup after OR Date 11/19/2022, exploratory laparotomy, lysis of adhesions, small bowel and cecum resection with anastomosis for small bowel obstruction. Patient has dementia but is much more clear in mental status compared to last visit. She has gained weight with increased appetite. Bowels loose but improving. Ambulating with improvement with a walker today. Midline wound also greatly improved.   No major complaints and presents for routine follow-up    Past Medical History:   Diagnosis Date    Age-related osteoporosis without current pathological fracture 11/05/2018    Arthritis     Osteoporosis      Past Surgical History:   Procedure Laterality Date    COLONOSCOPY      EYE SURGERY Right     cataract    HYSTERECTOMY (CERVIX STATUS UNKNOWN)  1984    LAPAROTOMY N/A 11/19/2022    EXPLORATORY LAPAROTOMY, LYSIS OF ADHESIONS,  BOWEL RESECTION performed by Lisa Waters MD at [place] [address]  2006    melanoma removed     Teo Lugo Right Dr. Elio Quiroz     Social History     Socioeconomic History    Marital status:      Spouse name: Michoacano Johnson    Number of children: 1    Years of education: 12    Highest education level: Not on file   Occupational History    Not on file   Tobacco Use    Smoking status: Never    Smokeless tobacco: Never   Vaping Use    Vaping Use: Never used   Substance and Sexual Activity    Alcohol use: No    Drug use: Never    Sexual activity: Not Currently   Other Topics Concern    Not on file   Social History Narrative    Not on file     Social Determinants of Health     Financial Resource Strain: Not on file   Food Insecurity: Not on file   Transportation Needs: Not on file   Physical Activity: Insufficiently Active    Days of Exercise per Week: 2 days    Minutes of Exercise per Session: 20 min   Stress: Not on file   Social Connections: Not on file   Intimate Partner Violence: Not on file   Housing Stability: Not on file      Family History   Problem Relation Age of Onset    Cancer Mother     Lung Cancer Father     Coronary Art Dis Brother      Current Outpatient Medications   Medication Sig Dispense Refill    alendronate (FOSAMAX) 10 MG tablet       Cholecalciferol (VITAMIN D3 PO) Take 1,000 Units by mouth daily      CALCIUM CITRATE-VITAMIN D PO Take 4 capsules by mouth daily 800 mg a day      estradiol (ESTRACE) 0.1 MG/GM vaginal cream Place 2 g vaginally Twice a Week       No current facility-administered medications for this visit. No Known Allergies     Review of Systems:  Review of systems performed and negative with the exception of the above findings    OBJECTIVE:  /60   Wt 106 lb (48.1 kg)   BMI 18.78 kg/m²      Physical Exam:  General appearance: alert, appears stated age, cooperative, and no distress  Abdomen: soft, non-distended, appropriate incisional tenderness, incision clean dry and intact except for open lower midline wound which is much improved from last visit      No results displayed because visit has over 200 results. Assessment:  OR Date 11/19/2022, exploratory laparotomy, lysis of adhesions, small bowel and cecum resection with anastomosis for small bowel obstruction      Plan:  1. Encourage oral intake, nutrition critical for wound healing  2. Does not need additional antibiotics, cellulitis resolved   3. Continue local wound care, does not need incision drainage debridement. Wound greatly improved and anticipate will be completely healed within the next 1 to 2 weeks  4. No heavy lifting over 20lbs for 6 weeks total postop  5. Follow with general surgery office as needed     Carissa Bob.  Coleen Winslow MD, FACS  12/27/2022  2:45 PM

## 2022-12-27 NOTE — PATIENT INSTRUCTIONS
1.  Encourage oral intake, nutrition critical for wound healing  2. Does not need additional antibiotics, cellulitis resolved   3. Continue local wound care, does not need incision drainage debridement. Wound greatly improved and anticipate will be completely healed within the next 1 to 2 weeks  4. No heavy lifting over 20lbs for 6 weeks total postop  5.   Follow with general surgery office as needed

## 2023-01-12 NOTE — PROGRESS NOTES
Antoinette Riggs is a 79 y.o. female. HPI:  Concerned about memory  Struggle to recover quickly after her small bowel obstruction surgery  Needs refill on Fosamax, ? Taking regularly    Meds, vitamins and allergies reviewed with pt    Wt Readings from Last 3 Encounters:   01/13/23 105 lb 9.6 oz (47.9 kg)   12/27/22 106 lb (48.1 kg)   11/28/22 106 lb 8 oz (48.3 kg)       REVIEW OF SYSTEMS:   CONSTITUTIONAL: See history of present illness,   Weight noted   HEENT: No new vision difficulties or ringing in the ears. RESPIRATORY: No new SOB, PND, orthopnea or cough. CARDIOVASCULAR: no CP, palpitations or SOB with exertion  GI: No nausea, vomiting, diarrhea, constipation, abdominal pain or changes in bowel habits. : No urinary frequency, urgency, incontinence hematuria or dysuria. SKIN: No cyanosis or skin lesions. MUSCULOSKELETAL: No new muscle or joint pain. NEUROLOGICAL: No syncope or TIA-like symptoms. PSYCHIATRIC: No anxiety, insomnia or depression     No Known Allergies    Prior to Visit Medications    Medication Sig Taking?  Authorizing Provider   alendronate (FOSAMAX) 10 MG tablet Take 1 tablet by mouth once a week Yes Zita Hong MD   calcium citrate (CALCITRATE) 250 MG TABS tablet 2 tabs po daily Yes Zita Hong MD   Cholecalciferol (VITAMIN D3 PO) Take 1,000 Units by mouth daily Yes Historical Provider, MD   estradiol (ESTRACE) 0.1 MG/GM vaginal cream Place 2 g vaginally Twice a Week Yes Historical Provider, MD       Past Medical History:   Diagnosis Date    Age-related osteoporosis without current pathological fracture 11/05/2018    Arthritis     Osteoporosis        Social History     Tobacco Use    Smoking status: Never    Smokeless tobacco: Never   Substance Use Topics    Alcohol use: No       Family History   Problem Relation Age of Onset    Cancer Mother     Lung Cancer Father     Coronary Art Dis Brother        OBJECTIVE:  /62 (Site: Left Upper Arm, Position: Sitting, Cuff Size: Small Adult)   Pulse 67   Resp 16   Ht 5' 3\" (1.6 m)   Wt 105 lb 9.6 oz (47.9 kg)   SpO2 99%   BMI 18.71 kg/m²   GEN:  in NAD, thin white female looks mildly anemic  HEENT:  NCAT, TMs:normal and throat: clear  NECK:  Supple without adenopathy. CV:  Regular rate and rhythm, S1 and S2 normal, no significant murmur heard  PULM:  Chest is clear, no wheezing ,  symmetric air entry throughout both lung fields. ABD: Soft, NT no masses appreciated  EXT: No rash or edema  NEURO: Alert oriented ×3, nonfocal, no assistive device    MOCA=20/30     ASSESSMENT/PLAN:  1. Age-related osteoporosis without current pathological fracture  Fosamax weekly not daily  Check labs  Walk  Calcium and vitamin D regularly    - CBC with Auto Differential; Future  - Comprehensive Metabolic Panel; Future  - TSH with Reflex; Future  - Vitamin B12 & Folate; Future  - Vitamin D 25 Hydroxy; Future    2. Memory difficulties  Do puzzles read  Recheck [place] 3 months  - CBC with Auto Differential; Future  - Comprehensive Metabolic Panel; Future  - TSH with Reflex; Future  - Vitamin B12 & Folate; Future  - Vitamin D 25 Hydroxy; Future    3.  Other fatigue  Recheck lab    30 Total Minutes spent pre charting (reviewing problem list, meds, any test results, consultant and hospital notes ) and  obtaining present visit history, performing appropriate medical exam/evaluation, counseling and educating the patient (and family), ordering medications ,tests, and procedures as needed, refilling medication(s), placing referral(s) when needed in addition to coordinating care for this patient and documenting in electronic health record

## 2023-01-13 ENCOUNTER — TELEPHONE (OUTPATIENT)
Dept: FAMILY MEDICINE CLINIC | Age: 83
End: 2023-01-13

## 2023-01-13 ENCOUNTER — OFFICE VISIT (OUTPATIENT)
Dept: FAMILY MEDICINE CLINIC | Age: 83
End: 2023-01-13

## 2023-01-13 VITALS
HEART RATE: 67 BPM | SYSTOLIC BLOOD PRESSURE: 135 MMHG | HEIGHT: 63 IN | BODY MASS INDEX: 18.71 KG/M2 | WEIGHT: 105.6 LBS | DIASTOLIC BLOOD PRESSURE: 62 MMHG | OXYGEN SATURATION: 99 % | RESPIRATION RATE: 16 BRPM

## 2023-01-13 DIAGNOSIS — M81.0 AGE-RELATED OSTEOPOROSIS WITHOUT CURRENT PATHOLOGICAL FRACTURE: ICD-10-CM

## 2023-01-13 DIAGNOSIS — R41.3 MEMORY DIFFICULTIES: ICD-10-CM

## 2023-01-13 DIAGNOSIS — M81.0 AGE-RELATED OSTEOPOROSIS WITHOUT CURRENT PATHOLOGICAL FRACTURE: Primary | ICD-10-CM

## 2023-01-13 DIAGNOSIS — R53.83 OTHER FATIGUE: ICD-10-CM

## 2023-01-13 LAB
A/G RATIO: 1.5 (ref 1.1–2.2)
ALBUMIN SERPL-MCNC: 3.7 G/DL (ref 3.4–5)
ALP BLD-CCNC: 58 U/L (ref 40–129)
ALT SERPL-CCNC: 17 U/L (ref 10–40)
ANION GAP SERPL CALCULATED.3IONS-SCNC: 11 MMOL/L (ref 3–16)
AST SERPL-CCNC: 23 U/L (ref 15–37)
BASOPHILS ABSOLUTE: 0.1 K/UL (ref 0–0.2)
BASOPHILS RELATIVE PERCENT: 0.9 %
BILIRUB SERPL-MCNC: <0.2 MG/DL (ref 0–1)
BUN BLDV-MCNC: 11 MG/DL (ref 7–20)
CALCIUM SERPL-MCNC: 9.1 MG/DL (ref 8.3–10.6)
CHLORIDE BLD-SCNC: 103 MMOL/L (ref 99–110)
CO2: 29 MMOL/L (ref 21–32)
CREAT SERPL-MCNC: <0.5 MG/DL (ref 0.6–1.2)
EOSINOPHILS ABSOLUTE: 0.1 K/UL (ref 0–0.6)
EOSINOPHILS RELATIVE PERCENT: 1.2 %
FOLATE: 16.14 NG/ML (ref 4.78–24.2)
GFR SERPL CREATININE-BSD FRML MDRD: >60 ML/MIN/{1.73_M2}
GLUCOSE BLD-MCNC: 106 MG/DL (ref 70–99)
HCT VFR BLD CALC: 31.5 % (ref 36–48)
HEMOGLOBIN: 10.4 G/DL (ref 12–16)
LYMPHOCYTES ABSOLUTE: 2.4 K/UL (ref 1–5.1)
LYMPHOCYTES RELATIVE PERCENT: 41.8 %
MCH RBC QN AUTO: 30.6 PG (ref 26–34)
MCHC RBC AUTO-ENTMCNC: 32.9 G/DL (ref 31–36)
MCV RBC AUTO: 92.8 FL (ref 80–100)
MONOCYTES ABSOLUTE: 0.4 K/UL (ref 0–1.3)
MONOCYTES RELATIVE PERCENT: 7.3 %
NEUTROPHILS ABSOLUTE: 2.8 K/UL (ref 1.7–7.7)
NEUTROPHILS RELATIVE PERCENT: 48.8 %
PDW BLD-RTO: 16.3 % (ref 12.4–15.4)
PLATELET # BLD: 262 K/UL (ref 135–450)
PMV BLD AUTO: 8.1 FL (ref 5–10.5)
POTASSIUM SERPL-SCNC: 3.1 MMOL/L (ref 3.5–5.1)
RBC # BLD: 3.4 M/UL (ref 4–5.2)
SODIUM BLD-SCNC: 143 MMOL/L (ref 136–145)
TOTAL PROTEIN: 6.1 G/DL (ref 6.4–8.2)
TSH REFLEX: 1.99 UIU/ML (ref 0.27–4.2)
VITAMIN B-12: 372 PG/ML (ref 211–911)
VITAMIN D 25-HYDROXY: 35.6 NG/ML
WBC # BLD: 5.7 K/UL (ref 4–11)

## 2023-01-13 RX ORDER — ALENDRONATE SODIUM 10 MG/1
10 TABLET ORAL WEEKLY
Qty: 12 TABLET | Refills: 3 | Status: SHIPPED | OUTPATIENT
Start: 2023-01-13 | End: 2023-01-13

## 2023-01-13 RX ORDER — ALENDRONATE SODIUM 70 MG/1
70 TABLET ORAL WEEKLY
Qty: 12 TABLET | Refills: 3 | Status: SHIPPED | OUTPATIENT
Start: 2023-01-13

## 2023-01-13 RX ORDER — PSEUDOEPHEDRINE HCL 30 MG
TABLET ORAL
Qty: 60 TABLET | Refills: 5 | Status: SHIPPED | OUTPATIENT
Start: 2023-01-13

## 2023-01-13 SDOH — ECONOMIC STABILITY: FOOD INSECURITY: WITHIN THE PAST 12 MONTHS, THE FOOD YOU BOUGHT JUST DIDN'T LAST AND YOU DIDN'T HAVE MONEY TO GET MORE.: NEVER TRUE

## 2023-01-13 SDOH — ECONOMIC STABILITY: FOOD INSECURITY: WITHIN THE PAST 12 MONTHS, YOU WORRIED THAT YOUR FOOD WOULD RUN OUT BEFORE YOU GOT MONEY TO BUY MORE.: NEVER TRUE

## 2023-01-13 ASSESSMENT — PATIENT HEALTH QUESTIONNAIRE - PHQ9
SUM OF ALL RESPONSES TO PHQ QUESTIONS 1-9: 0
1. LITTLE INTEREST OR PLEASURE IN DOING THINGS: 0
SUM OF ALL RESPONSES TO PHQ9 QUESTIONS 1 & 2: 0
SUM OF ALL RESPONSES TO PHQ QUESTIONS 1-9: 0
2. FEELING DOWN, DEPRESSED OR HOPELESS: 0

## 2023-01-13 ASSESSMENT — SOCIAL DETERMINANTS OF HEALTH (SDOH): HOW HARD IS IT FOR YOU TO PAY FOR THE VERY BASICS LIKE FOOD, HOUSING, MEDICAL CARE, AND HEATING?: NOT HARD AT ALL

## 2023-01-13 NOTE — TELEPHONE ENCOUNTER
----- Message from Shanique Gamble sent at 1/13/2023 12:11 PM EST -----  Subject: Medication Problem    Medication: alendronate (FOSAMAX) 10 MG tablet  Dosage: 10mg   Ordering Provider: Maye Noyola    Question/Problem:  called to allow the provider that this   medication states that she is suppose to take this medication everyday and   was told she is suppose take it 1 time a week .Pt would like to discuss   with pcp .      Pharmacy: [facility] [id number] [place] [facility] - [address] -    [phone] - F [phone]    ---------------------------------------------------------------------------  --------------  CALL BACK INFO  [phone]; OK to leave message on voicemail  ---------------------------------------------------------------------------  --------------    SCRIPT ANSWERS  Relationship to Patient: Other  Representative Name: romero  Is the Representative on the appropriate HIPAA document in Epic: Yes

## 2023-01-13 NOTE — TELEPHONE ENCOUNTER
alendronate (FOSAMAX) 10 MG tablet  Pharmacy wants clarified directions on above medication. Is patient suppose to take one tablet once a week for 12 weeks, or 1 tablet each day for a week.  Pharmacy needs new rx

## 2023-01-13 NOTE — TELEPHONE ENCOUNTER
Script clarification:the patient has been on 10mg alendrondate daily. We want to confirm if this is a switch to weekly.  Or is it supposed to be 70 mg once per week?    alendronate (FOSAMAX) 70 MG tablet [

## 2023-01-14 DIAGNOSIS — E87.6 LOW BLOOD POTASSIUM: ICD-10-CM

## 2023-01-14 DIAGNOSIS — D50.8 IRON DEFICIENCY ANEMIA SECONDARY TO INADEQUATE DIETARY IRON INTAKE: Primary | ICD-10-CM

## 2023-01-14 RX ORDER — POTASSIUM CHLORIDE 750 MG/1
10 TABLET, EXTENDED RELEASE ORAL DAILY
Qty: 30 TABLET | Refills: 0 | Status: SHIPPED | OUTPATIENT
Start: 2023-01-14

## 2023-01-16 ENCOUNTER — TELEPHONE (OUTPATIENT)
Dept: FAMILY MEDICINE CLINIC | Age: 83
End: 2023-01-16

## 2023-01-16 NOTE — TELEPHONE ENCOUNTER
Spoke with Claudia Cordova () and he understood. He stated he will cut the pill because the pt can't swallow that big pill.

## 2023-01-16 NOTE — TELEPHONE ENCOUNTER
----- Message from Cecelia Gama sent at 1/16/2023 10:56 AM EST -----  Subject: Medication Problem    Medication: potassium chloride (KLOR-CON M) 10 MEQ extended release tablet  Dosage: Take 1 tablet by mouth daily  Ordering Provider: reggie    Question/Problem:  is needing to know if she needs to take this   medication. please give him a call.        Pharmacy: [facility] [id number] [place] [facility] [place], [address]   [phone] Rhys Parsonsiden [phone]    ---------------------------------------------------------------------------  --------------  Gabby VERGARA  [phone]; OK to leave message on voicemail  ---------------------------------------------------------------------------  --------------    SCRIPT ANSWERS  Relationship to Patient: Other  Representative Name: Teodora Lopez  Is the Representative on the appropriate HIPAA document in Epic: Yes

## 2023-01-17 ENCOUNTER — TELEPHONE (OUTPATIENT)
Dept: FAMILY MEDICINE CLINIC | Age: 83
End: 2023-01-17

## 2023-01-17 NOTE — TELEPHONE ENCOUNTER
Perez the patient's daughter stated she has duel POA with pt's spouse Alayna Franklin but Alayna Franklin is listed first and she is second. Perez stated she is at a loss because Alayna Franklin is incompetent of taking care of pt and making decisions for her. Perez stated Alayna Franklin does not want anyone in his house like PT,OT, or any kind of home care. He will not let a  in his house either. Perez stated Alayna Franklin has called her in the past and stated he does not wan to be pt's POA anymore as he does not want to do this anymore. He wants Perez to come pick pt up form their house and have Perez take care of pt. That is not possible either as she lives far away and has stairs. Perez feels stuck as she does not know what to do and now Alayna Franklin has blocked her from calling. Please call Perez to discuss more as she does not know what to do and cares about her mother. She would like her mother to just get the care she needs. Thank you!!         Jaye [phone]

## 2023-01-19 ENCOUNTER — TELEPHONE (OUTPATIENT)
Dept: FAMILY MEDICINE CLINIC | Age: 83
End: 2023-01-19

## 2023-01-19 NOTE — TELEPHONE ENCOUNTER
----- Message from Brian Arnold sent at 1/19/2023  8:38 AM EST -----  Subject: Message to Provider    QUESTIONS  Information for Provider? Mr. Alva Zapata called today to say that the Calcium   tablets of 250 mg are just to large for Hayley joseph. Please reach   out to Mr. Alva Zapata if there is an alternative tablet.   ---------------------------------------------------------------------------  --------------  Kristen MOBLEY  [phone]; OK to leave message on voicemail  ---------------------------------------------------------------------------  --------------  SCRIPT ANSWERS  Relationship to Patient? Other  Representative Name? Tone Pond  Is the Representative on the appropriate HIPAA document in Epic?  Yes

## 2023-01-23 ENCOUNTER — TELEPHONE (OUTPATIENT)
Dept: FAMILY MEDICINE CLINIC | Age: 83
End: 2023-01-23

## 2023-01-23 NOTE — TELEPHONE ENCOUNTER
Alyssa Mcdonald from [facility] health called and had to cancel fri visit for [facility] and needs  verbal for     Speech therapy   Please call sherrell at [phone] secure line
Patients  walked in today and said he can only find supplements online for patient. Gave me a piece of paper with ingredients on it for what the supplement has in it. Patient wants to make sure this is okay to take. I scanned paper in media.       Please advise and call back patient [phone]
Male

## 2023-01-26 ENCOUNTER — TELEPHONE (OUTPATIENT)
Dept: FAMILY MEDICINE CLINIC | Age: 83
End: 2023-01-26

## 2023-01-26 NOTE — TELEPHONE ENCOUNTER
Kelle,Speech Therapy, from [facility] called:  Patient declined visit for this week  Patient informed Catrachita Galo she could attempt next week

## 2023-02-03 ENCOUNTER — TELEPHONE (OUTPATIENT)
Dept: FAMILY MEDICINE CLINIC | Age: 83
End: 2023-02-03

## 2023-02-03 NOTE — TELEPHONE ENCOUNTER
[facility] called to say that they have not been able to get in contact with patient to complete the scheduled visits for speech therapy. The [facility] speech therapist is going to discharge the patient and will not show up for their next scheduled visit.  Just wanted to let kyle know

## 2023-02-15 ENCOUNTER — TELEPHONE (OUTPATIENT)
Dept: FAMILY MEDICINE CLINIC | Age: 83
End: 2023-02-15

## 2023-02-15 RX ORDER — POTASSIUM CHLORIDE 750 MG/1
10 TABLET, EXTENDED RELEASE ORAL DAILY
Qty: 30 TABLET | Refills: 0 | Status: SHIPPED | OUTPATIENT
Start: 2023-02-15

## 2023-02-15 NOTE — TELEPHONE ENCOUNTER
called and said that he wants to know if kyle wants pt to get refill on the below medication or just wait until pt gets blood work to decide if she still needs to take it.  Would like call back with response    potassium chloride (KLOR-CON M) 10 MEQ extended release tablet

## 2023-02-27 RX ORDER — POTASSIUM CHLORIDE 750 MG/1
TABLET, EXTENDED RELEASE ORAL
Qty: 30 TABLET | Refills: 0 | OUTPATIENT
Start: 2023-02-27

## 2023-03-13 ENCOUNTER — TELEPHONE (OUTPATIENT)
Dept: FAMILY MEDICINE CLINIC | Age: 83
End: 2023-03-13

## 2023-03-14 RX ORDER — POTASSIUM CHLORIDE 750 MG/1
TABLET, EXTENDED RELEASE ORAL
Qty: 30 TABLET | Refills: 0 | Status: SHIPPED | OUTPATIENT
Start: 2023-03-14

## 2023-03-14 NOTE — TELEPHONE ENCOUNTER
Left VM for pt to call back and receive Dr Tram Owens response. Loli Brizuela from [facility] called stating that they are not able to see the patient, because she has declined care.     Loli Brizuela can be reached at [phone]

## 2023-03-14 NOTE — TELEPHONE ENCOUNTER
Medication:   Requested Prescriptions     Pending Prescriptions Disp Refills    KLOR-CON M10 10 MEQ extended release tablet [Pharmacy Med Name: KLOR-CON M10 TABLET] 30 tablet 0     Sig: TAKE 1 TABLET BY MOUTH EVERY DAY        Last Filled:  2/15/2023    Patient Phone Number: [phone] (home)     Last appt: 1/13/2023   Next appt: Visit date not found    Last OARRS: No flowsheet data found.

## 2023-03-14 NOTE — TELEPHONE ENCOUNTER
Patient called back to receive response. She wants to get the potassium rechecked but there is no orders in there. Please call patient when orders are in.

## 2023-03-14 NOTE — TELEPHONE ENCOUNTER
Spoke with Derian Shay and let him know pt labs were in. They been in since 1/14/2023. Vanessa wasn't aware that them were the labs but I let her know CMP runs potassium.

## 2023-03-15 DIAGNOSIS — E87.6 LOW BLOOD POTASSIUM: ICD-10-CM

## 2023-03-15 DIAGNOSIS — D50.8 IRON DEFICIENCY ANEMIA SECONDARY TO INADEQUATE DIETARY IRON INTAKE: ICD-10-CM

## 2023-03-15 LAB
ALBUMIN SERPL-MCNC: 3.9 G/DL (ref 3.4–5)
ALBUMIN/GLOB SERPL: 1.5 {RATIO} (ref 1.1–2.2)
ALP SERPL-CCNC: 67 U/L (ref 40–129)
ALT SERPL-CCNC: 16 U/L (ref 10–40)
ANION GAP SERPL CALCULATED.3IONS-SCNC: 12 MMOL/L (ref 3–16)
AST SERPL-CCNC: 22 U/L (ref 15–37)
BASOPHILS # BLD: 0 K/UL (ref 0–0.2)
BASOPHILS NFR BLD: 0 %
BILIRUB SERPL-MCNC: <0.2 MG/DL (ref 0–1)
BUN SERPL-MCNC: 12 MG/DL (ref 7–20)
CALCIUM SERPL-MCNC: 9.3 MG/DL (ref 8.3–10.6)
CHLORIDE SERPL-SCNC: 105 MMOL/L (ref 99–110)
CO2 SERPL-SCNC: 25 MMOL/L (ref 21–32)
CREAT SERPL-MCNC: 0.5 MG/DL (ref 0.6–1.2)
DEPRECATED RDW RBC AUTO: 13.3 % (ref 12.4–15.4)
EOSINOPHIL # BLD: 0.2 K/UL (ref 0–0.6)
EOSINOPHIL NFR BLD: 3 %
GFR SERPLBLD CREATININE-BSD FMLA CKD-EPI: >60 ML/MIN/{1.73_M2}
GLUCOSE SERPL-MCNC: 86 MG/DL (ref 70–99)
HCT VFR BLD AUTO: 32.3 % (ref 36–48)
HGB BLD-MCNC: 10.8 G/DL (ref 12–16)
LYMPHOCYTES # BLD: 2.2 K/UL (ref 1–5.1)
LYMPHOCYTES NFR BLD: 40 %
MCH RBC QN AUTO: 30.5 PG (ref 26–34)
MCHC RBC AUTO-ENTMCNC: 33.2 G/DL (ref 31–36)
MCV RBC AUTO: 91.8 FL (ref 80–100)
MONOCYTES # BLD: 0.1 K/UL (ref 0–1.3)
MONOCYTES NFR BLD: 1 %
NEUTROPHILS # BLD: 3.1 K/UL (ref 1.7–7.7)
NEUTROPHILS NFR BLD: 55 %
NEUTS BAND NFR BLD MANUAL: 1 % (ref 0–7)
PLATELET # BLD AUTO: 273 K/UL (ref 135–450)
PMV BLD AUTO: 7.6 FL (ref 5–10.5)
POTASSIUM SERPL-SCNC: 4.2 MMOL/L (ref 3.5–5.1)
PROT SERPL-MCNC: 6.5 G/DL (ref 6.4–8.2)
RBC # BLD AUTO: 3.52 M/UL (ref 4–5.2)
RBC MORPH BLD: NORMAL
SODIUM SERPL-SCNC: 142 MMOL/L (ref 136–145)
WBC # BLD AUTO: 5.6 K/UL (ref 4–11)

## 2023-03-17 ENCOUNTER — TELEPHONE (OUTPATIENT)
Dept: FAMILY MEDICINE CLINIC | Age: 83
End: 2023-03-17

## 2023-03-17 NOTE — TELEPHONE ENCOUNTER
Patient  is calling to find out what his next steps for his wife for memory care    He stated he was waiting on someone to call and advise       Ирина Stern [phone]

## 2023-03-21 ENCOUNTER — OFFICE VISIT (OUTPATIENT)
Dept: FAMILY MEDICINE CLINIC | Age: 83
End: 2023-03-21
Payer: COMMERCIAL

## 2023-03-21 VITALS
DIASTOLIC BLOOD PRESSURE: 68 MMHG | WEIGHT: 103.2 LBS | HEIGHT: 63 IN | RESPIRATION RATE: 16 BRPM | SYSTOLIC BLOOD PRESSURE: 127 MMHG | BODY MASS INDEX: 18.29 KG/M2 | HEART RATE: 58 BPM | OXYGEN SATURATION: 96 %

## 2023-03-21 DIAGNOSIS — E87.6 LOW BLOOD POTASSIUM: ICD-10-CM

## 2023-03-21 DIAGNOSIS — M81.0 AGE-RELATED OSTEOPOROSIS WITHOUT CURRENT PATHOLOGICAL FRACTURE: ICD-10-CM

## 2023-03-21 DIAGNOSIS — R41.3 MEMORY CHANGE: Primary | ICD-10-CM

## 2023-03-21 PROCEDURE — 99213 OFFICE O/P EST LOW 20 MIN: CPT | Performed by: FAMILY MEDICINE

## 2023-03-21 PROCEDURE — 1123F ACP DISCUSS/DSCN MKR DOCD: CPT | Performed by: FAMILY MEDICINE

## 2023-03-21 RX ORDER — POTASSIUM CHLORIDE 750 MG/1
10 TABLET, EXTENDED RELEASE ORAL DAILY
Qty: 90 TABLET | Refills: 1 | Status: SHIPPED | OUTPATIENT
Start: 2023-03-21

## 2023-03-21 SDOH — ECONOMIC STABILITY: HOUSING INSECURITY
IN THE LAST 12 MONTHS, WAS THERE A TIME WHEN YOU DID NOT HAVE A STEADY PLACE TO SLEEP OR SLEPT IN A SHELTER (INCLUDING NOW)?: NO

## 2023-03-21 SDOH — ECONOMIC STABILITY: FOOD INSECURITY: WITHIN THE PAST 12 MONTHS, YOU WORRIED THAT YOUR FOOD WOULD RUN OUT BEFORE YOU GOT MONEY TO BUY MORE.: NEVER TRUE

## 2023-03-21 SDOH — ECONOMIC STABILITY: FOOD INSECURITY: WITHIN THE PAST 12 MONTHS, THE FOOD YOU BOUGHT JUST DIDN'T LAST AND YOU DIDN'T HAVE MONEY TO GET MORE.: NEVER TRUE

## 2023-03-21 SDOH — ECONOMIC STABILITY: INCOME INSECURITY: HOW HARD IS IT FOR YOU TO PAY FOR THE VERY BASICS LIKE FOOD, HOUSING, MEDICAL CARE, AND HEATING?: NOT HARD AT ALL

## 2023-03-21 ASSESSMENT — PATIENT HEALTH QUESTIONNAIRE - PHQ9
SUM OF ALL RESPONSES TO PHQ QUESTIONS 1-9: 0
SUM OF ALL RESPONSES TO PHQ9 QUESTIONS 1 & 2: 0
SUM OF ALL RESPONSES TO PHQ QUESTIONS 1-9: 0
2. FEELING DOWN, DEPRESSED OR HOPELESS: 0
SUM OF ALL RESPONSES TO PHQ QUESTIONS 1-9: 0
SUM OF ALL RESPONSES TO PHQ QUESTIONS 1-9: 0
1. LITTLE INTEREST OR PLEASURE IN DOING THINGS: 0

## 2023-03-21 NOTE — PROGRESS NOTES
Abby Renteria is a 79 y.o. female. HPI:    Accompanied by , overall looks better, more interactive  Has taken patient a while to recover from her small bowel obstruction  Concerned about memory, will recheck [place] today    We will review labs and meds      Meds, vitamins and allergies reviewed with pt    Wt Readings from Last 3 Encounters:   03/21/23 103 lb 3.2 oz (46.8 kg)   01/13/23 105 lb 9.6 oz (47.9 kg)   12/27/22 106 lb (48.1 kg)       REVIEW OF SYSTEMS:   CONSTITUTIONAL: See history of present illness,   Weight noted   HEENT: No new vision difficulties or ringing in the ears. RESPIRATORY: No new SOB, PND, orthopnea or cough. CARDIOVASCULAR: no CP, palpitations or SOB with exertion  GI: No nausea, vomiting, diarrhea, constipation, abdominal pain or changes in bowel habits. : No urinary frequency, urgency, incontinence hematuria or dysuria. SKIN: No cyanosis or skin lesions. MUSCULOSKELETAL: No new muscle or joint pain. NEUROLOGICAL: No syncope or TIA-like symptoms. PSYCHIATRIC: No anxiety, insomnia or depression     No Known Allergies    Prior to Visit Medications    Medication Sig Taking?  Authorizing Provider   potassium chloride (KLOR-CON M10) 10 MEQ extended release tablet Take 1 tablet by mouth daily Yes Chandler Kelly MD   calcium citrate (CALCITRATE) 250 MG TABS tablet 2 tabs po daily Yes Chandler Kelly MD   alendronate (FOSAMAX) 70 MG tablet Take 1 tablet by mouth once a week Yes Chandler Kelly MD   Cholecalciferol (VITAMIN D3 PO) Take 1,000 Units by mouth daily Yes Historical Provider, MD       Past Medical History:   Diagnosis Date    Age-related osteoporosis without current pathological fracture 11/05/2018    Arthritis     Osteoporosis        Social History     Tobacco Use    Smoking status: Never    Smokeless tobacco: Never   Substance Use Topics    Alcohol use: No       Family History   Problem Relation Age of Onset    Cancer Mother     Lung Cancer Father     Coronary Art

## 2023-04-18 ENCOUNTER — OFFICE VISIT (OUTPATIENT)
Dept: FAMILY MEDICINE CLINIC | Age: 83
End: 2023-04-18

## 2023-04-18 VITALS
SYSTOLIC BLOOD PRESSURE: 130 MMHG | HEART RATE: 73 BPM | RESPIRATION RATE: 16 BRPM | OXYGEN SATURATION: 98 % | BODY MASS INDEX: 18.96 KG/M2 | DIASTOLIC BLOOD PRESSURE: 80 MMHG | WEIGHT: 107 LBS | HEIGHT: 63 IN

## 2023-04-18 DIAGNOSIS — D50.8 IRON DEFICIENCY ANEMIA SECONDARY TO INADEQUATE DIETARY IRON INTAKE: ICD-10-CM

## 2023-04-18 DIAGNOSIS — Z78.9 WEIGHT GAIN ADVISED: ICD-10-CM

## 2023-04-18 DIAGNOSIS — R03.0 WHITE COAT SYNDROME WITHOUT HYPERTENSION: Primary | ICD-10-CM

## 2023-04-18 NOTE — PROGRESS NOTES
Johan Cuevas is a 79 y.o. female. HPI:  Here For reassessment of memory and weight check  Overall doing much better since her small bowel obstruction surgery  Eating better, mentally better  Had difficulty recovering from hospitalization a few months ago      Meds, vitamins and allergies reviewed with pt    Wt Readings from Last 3 Encounters:   04/18/23 107 lb (48.5 kg)   03/21/23 103 lb 3.2 oz (46.8 kg)   01/13/23 105 lb 9.6 oz (47.9 kg)       REVIEW OF SYSTEMS:   CONSTITUTIONAL: See history of present illness,   Weight noted   HEENT: No new vision difficulties or ringing in the ears. RESPIRATORY: No new SOB, PND, orthopnea or cough. CARDIOVASCULAR: no CP, palpitations or SOB with exertion  GI: No nausea, vomiting, diarrhea, constipation, abdominal pain or changes in bowel habits. : No urinary frequency, urgency, incontinence hematuria or dysuria. SKIN: No cyanosis or skin lesions. MUSCULOSKELETAL: No new muscle or joint pain. NEUROLOGICAL: No syncope or TIA-like symptoms. PSYCHIATRIC: No anxiety, insomnia or depression     No Known Allergies    Prior to Visit Medications    Medication Sig Taking?  Authorizing Provider   potassium chloride (KLOR-CON M10) 10 MEQ extended release tablet Take 1 tablet by mouth daily Yes Sergey Sanchez MD   calcium citrate (CALCITRATE) 250 MG TABS tablet 2 tabs po daily Yes Sergey Sanchez MD   alendronate (FOSAMAX) 70 MG tablet Take 1 tablet by mouth once a week Yes Sergey Sanchez MD   Cholecalciferol (VITAMIN D3 PO) Take 1,000 Units by mouth daily Yes Historical Provider, MD       Past Medical History:   Diagnosis Date    Age-related osteoporosis without current pathological fracture 11/05/2018    Arthritis     Osteoporosis        Social History     Tobacco Use    Smoking status: Never    Smokeless tobacco: Never   Substance Use Topics    Alcohol use: No       Family History   Problem Relation Age of Onset    Cancer Mother     Lung Cancer Father     Coronary Art Dis

## 2023-04-19 ENCOUNTER — TELEPHONE (OUTPATIENT)
Dept: FAMILY MEDICINE CLINIC | Age: 83
End: 2023-04-19

## 2023-04-19 NOTE — TELEPHONE ENCOUNTER
Patient  called to ask about patients appointment yesterday. The avs doesn't say patient should continue her iron pill,  just wants clarification on this. Is patient still taking iron pill?     Please call back at [phone]

## 2023-05-17 DIAGNOSIS — D50.8 IRON DEFICIENCY ANEMIA SECONDARY TO INADEQUATE DIETARY IRON INTAKE: ICD-10-CM

## 2023-05-17 LAB
BASOPHILS # BLD: 0 K/UL (ref 0–0.2)
BASOPHILS NFR BLD: 0.6 %
DEPRECATED RDW RBC AUTO: 14.3 % (ref 12.4–15.4)
EOSINOPHIL # BLD: 0.1 K/UL (ref 0–0.6)
EOSINOPHIL NFR BLD: 1.9 %
HCT VFR BLD AUTO: 35.1 % (ref 36–48)
HGB BLD-MCNC: 11.9 G/DL (ref 12–16)
LYMPHOCYTES # BLD: 2.2 K/UL (ref 1–5.1)
LYMPHOCYTES NFR BLD: 29.4 %
MCH RBC QN AUTO: 31.9 PG (ref 26–34)
MCHC RBC AUTO-ENTMCNC: 33.8 G/DL (ref 31–36)
MCV RBC AUTO: 94.3 FL (ref 80–100)
MONOCYTES # BLD: 0.4 K/UL (ref 0–1.3)
MONOCYTES NFR BLD: 5.5 %
NEUTROPHILS # BLD: 4.7 K/UL (ref 1.7–7.7)
NEUTROPHILS NFR BLD: 62.6 %
PLATELET # BLD AUTO: 230 K/UL (ref 135–450)
PMV BLD AUTO: 7.5 FL (ref 5–10.5)
RBC # BLD AUTO: 3.72 M/UL (ref 4–5.2)
WBC # BLD AUTO: 7.4 K/UL (ref 4–11)

## 2023-05-18 ENCOUNTER — TELEPHONE (OUTPATIENT)
Dept: FAMILY MEDICINE CLINIC | Age: 83
End: 2023-05-18

## 2023-08-02 DIAGNOSIS — D50.8 IRON DEFICIENCY ANEMIA SECONDARY TO INADEQUATE DIETARY IRON INTAKE: Primary | ICD-10-CM

## 2023-08-02 DIAGNOSIS — D50.8 IRON DEFICIENCY ANEMIA SECONDARY TO INADEQUATE DIETARY IRON INTAKE: ICD-10-CM

## 2023-08-02 LAB
BASOPHILS # BLD: 0 K/UL (ref 0–0.2)
BASOPHILS NFR BLD: 0.8 %
DEPRECATED RDW RBC AUTO: 13.5 % (ref 12.4–15.4)
EOSINOPHIL # BLD: 0.1 K/UL (ref 0–0.6)
EOSINOPHIL NFR BLD: 2 %
HCT VFR BLD AUTO: 35.1 % (ref 36–48)
HGB BLD-MCNC: 12.3 G/DL (ref 12–16)
LYMPHOCYTES # BLD: 1.9 K/UL (ref 1–5.1)
LYMPHOCYTES NFR BLD: 32.8 %
MCH RBC QN AUTO: 32.4 PG (ref 26–34)
MCHC RBC AUTO-ENTMCNC: 35.1 G/DL (ref 31–36)
MCV RBC AUTO: 92.2 FL (ref 80–100)
MONOCYTES # BLD: 0.4 K/UL (ref 0–1.3)
MONOCYTES NFR BLD: 7.2 %
NEUTROPHILS # BLD: 3.3 K/UL (ref 1.7–7.7)
NEUTROPHILS NFR BLD: 57.2 %
PLATELET # BLD AUTO: 234 K/UL (ref 135–450)
PMV BLD AUTO: 7.6 FL (ref 5–10.5)
RBC # BLD AUTO: 3.81 M/UL (ref 4–5.2)
WBC # BLD AUTO: 5.8 K/UL (ref 4–11)

## 2023-08-08 ENCOUNTER — TELEPHONE (OUTPATIENT)
Dept: FAMILY MEDICINE CLINIC | Age: 83
End: 2023-08-08

## 2023-08-08 ENCOUNTER — OFFICE VISIT (OUTPATIENT)
Dept: FAMILY MEDICINE CLINIC | Age: 83
End: 2023-08-08

## 2023-08-08 VITALS
HEART RATE: 69 BPM | DIASTOLIC BLOOD PRESSURE: 74 MMHG | HEIGHT: 63 IN | BODY MASS INDEX: 19.49 KG/M2 | SYSTOLIC BLOOD PRESSURE: 128 MMHG | WEIGHT: 110 LBS | RESPIRATION RATE: 16 BRPM | OXYGEN SATURATION: 99 %

## 2023-08-08 DIAGNOSIS — Z00.00 MEDICARE ANNUAL WELLNESS VISIT, SUBSEQUENT: Primary | ICD-10-CM

## 2023-08-08 DIAGNOSIS — R03.0 WHITE COAT SYNDROME WITHOUT HYPERTENSION: ICD-10-CM

## 2023-08-08 DIAGNOSIS — M81.0 AGE-RELATED OSTEOPOROSIS WITHOUT CURRENT PATHOLOGICAL FRACTURE: ICD-10-CM

## 2023-08-08 ASSESSMENT — PATIENT HEALTH QUESTIONNAIRE - PHQ9
2. FEELING DOWN, DEPRESSED OR HOPELESS: 0
SUM OF ALL RESPONSES TO PHQ QUESTIONS 1-9: 0
SUM OF ALL RESPONSES TO PHQ9 QUESTIONS 1 & 2: 0
SUM OF ALL RESPONSES TO PHQ QUESTIONS 1-9: 0
1. LITTLE INTEREST OR PLEASURE IN DOING THINGS: 0

## 2023-08-08 NOTE — PATIENT INSTRUCTIONS

## 2023-08-08 NOTE — TELEPHONE ENCOUNTER
Patient called and stated she was at her appointment today and she has been taking iron supplements but on her medication list we printed out for her it does not have them listed. Is she still supposed to be taking these? ? She also wants copies of her recent blood work results ready for       And she stated Juliette Lennie was supposed to send ear drops to her pharmacy and I did not see anything in here about this?        Please advise and contact patient with updates on all 3 issues

## 2023-10-06 RX ORDER — POTASSIUM CHLORIDE 750 MG/1
10 TABLET, EXTENDED RELEASE ORAL DAILY
Qty: 90 TABLET | Refills: 1 | Status: SHIPPED | OUTPATIENT
Start: 2023-10-06

## 2023-10-06 NOTE — TELEPHONE ENCOUNTER
Medication:   Requested Prescriptions     Pending Prescriptions Disp Refills    KLOR-CON M10 10 MEQ extended release tablet [Pharmacy Med Name: Patsy Cordova TABLET] 90 tablet 1     Sig: TAKE 1 TABLET BY MOUTH EVERY DAY        Last Filled:  3/21/2023    Patient Phone Number: [phone] (home)     Last appt: 8/8/2023   Next appt: Visit date not found    Last OARRS:        No data to display

## 2023-10-10 RX ORDER — ALENDRONATE SODIUM 70 MG/1
TABLET ORAL
Qty: 12 TABLET | Refills: 3 | Status: SHIPPED | OUTPATIENT
Start: 2023-10-10

## 2023-10-10 NOTE — TELEPHONE ENCOUNTER
Medication:   Requested Prescriptions     Pending Prescriptions Disp Refills    alendronate (FOSAMAX) 70 MG tablet [Pharmacy Med Name: ALENDRONATE SODIUM 70 MG TAB] 12 tablet 3     Sig: TAKE 1 TABLET BY MOUTH ONE TIME PER WEEK        Last Filled:  01/13/2023    Patient Phone Number: [phone] (home)     Last appt: 8/8/2023   Next appt: Visit date not found    Last OARRS:        No data to display

## 2023-10-26 NOTE — PROGRESS NOTES
JOANA dressing in place but without Negative Pressure. Light flashing red. Batteries changed, line connections checked. Occlusive dressing changed. [employer] does not carry another dressing kit and 2nd Dressing kit in box used this morning.  Call out to surgeon, Dr. Jame Allen said to leave dressing alone, Surgeon to re-evaluate in the AM. Consent (Nose)/Introductory Paragraph: The rationale for Mohs was explained to the patient and consent was obtained. The risks, benefits and alternatives to therapy were discussed in detail. Specifically, the risks of nasal deformity, changes in the flow of air through the nose, infection, scarring, bleeding, prolonged wound healing, incomplete removal, allergy to anesthesia, nerve injury and recurrence were addressed. Prior to the procedure, the treatment site was clearly identified and confirmed by the patient. All components of Universal Protocol/PAUSE Rule completed.

## 2023-10-31 ENCOUNTER — TELEPHONE (OUTPATIENT)
Dept: FAMILY MEDICINE CLINIC | Age: 83
End: 2023-10-31

## 2023-10-31 NOTE — TELEPHONE ENCOUNTER
----- Message from Nallely Lee sent at 10/31/2023  2:54 PM EDT -----  Subject: Message to Provider    QUESTIONS  Information for Provider? Pt calling to check in. Would Dr Jah Lackey like pt   to come in to be seen. ? Last seen in Aug, 2023. Please call pt to advise.   ---------------------------------------------------------------------------  --------------  Betty Combs INFO  [phone]; OK to leave message on voicemail  ---------------------------------------------------------------------------  --------------  SCRIPT ANSWERS  Relationship to Patient?  Self

## 2023-11-08 ENCOUNTER — OFFICE VISIT (OUTPATIENT)
Dept: FAMILY MEDICINE CLINIC | Age: 83
End: 2023-11-08

## 2023-11-08 VITALS
BODY MASS INDEX: 20.34 KG/M2 | DIASTOLIC BLOOD PRESSURE: 62 MMHG | OXYGEN SATURATION: 96 % | SYSTOLIC BLOOD PRESSURE: 122 MMHG | HEART RATE: 77 BPM | WEIGHT: 114.8 LBS

## 2023-11-08 DIAGNOSIS — E87.6 LOW BLOOD POTASSIUM: ICD-10-CM

## 2023-11-08 DIAGNOSIS — M81.0 AGE-RELATED OSTEOPOROSIS WITHOUT CURRENT PATHOLOGICAL FRACTURE: ICD-10-CM

## 2023-11-08 DIAGNOSIS — D53.9 NUTRITIONAL ANEMIA: Primary | ICD-10-CM

## 2023-11-08 DIAGNOSIS — R03.0 WHITE COAT SYNDROME WITHOUT HYPERTENSION: ICD-10-CM

## 2023-11-08 DIAGNOSIS — D53.9 NUTRITIONAL ANEMIA: ICD-10-CM

## 2023-11-08 LAB
ANION GAP SERPL CALCULATED.3IONS-SCNC: 12 MMOL/L (ref 3–16)
BASOPHILS # BLD: 0.1 K/UL (ref 0–0.2)
BASOPHILS NFR BLD: 0.7 %
BUN SERPL-MCNC: 12 MG/DL (ref 7–20)
CALCIUM SERPL-MCNC: 9.5 MG/DL (ref 8.3–10.6)
CHLORIDE SERPL-SCNC: 101 MMOL/L (ref 99–110)
CO2 SERPL-SCNC: 27 MMOL/L (ref 21–32)
CREAT SERPL-MCNC: 0.6 MG/DL (ref 0.6–1.2)
DEPRECATED RDW RBC AUTO: 13 % (ref 12.4–15.4)
EOSINOPHIL # BLD: 0.2 K/UL (ref 0–0.6)
EOSINOPHIL NFR BLD: 3.6 %
GFR SERPLBLD CREATININE-BSD FMLA CKD-EPI: >60 ML/MIN/{1.73_M2}
GLUCOSE SERPL-MCNC: 105 MG/DL (ref 70–99)
HCT VFR BLD AUTO: 36.4 % (ref 36–48)
HGB BLD-MCNC: 12.4 G/DL (ref 12–16)
LYMPHOCYTES # BLD: 2.3 K/UL (ref 1–5.1)
LYMPHOCYTES NFR BLD: 32.3 %
MCH RBC QN AUTO: 32 PG (ref 26–34)
MCHC RBC AUTO-ENTMCNC: 34 G/DL (ref 31–36)
MCV RBC AUTO: 94.2 FL (ref 80–100)
MONOCYTES # BLD: 0.5 K/UL (ref 0–1.3)
MONOCYTES NFR BLD: 6.6 %
NEUTROPHILS # BLD: 4 K/UL (ref 1.7–7.7)
NEUTROPHILS NFR BLD: 56.8 %
PLATELET # BLD AUTO: 227 K/UL (ref 135–450)
PMV BLD AUTO: 7.6 FL (ref 5–10.5)
POTASSIUM SERPL-SCNC: 4.2 MMOL/L (ref 3.5–5.1)
RBC # BLD AUTO: 3.87 M/UL (ref 4–5.2)
SODIUM SERPL-SCNC: 140 MMOL/L (ref 136–145)
WBC # BLD AUTO: 7 K/UL (ref 4–11)

## 2023-11-08 RX ORDER — D-METHORPHAN/PE/ACETAMINOPHEN 10-5-325MG
CAPSULE ORAL
COMMUNITY

## 2024-01-29 NOTE — PROGRESS NOTES
[facility]  Progress Note    Pt Name: Ubaldo Ortiz  MRN: [id number]  YOB: 1940  Date of Evaluation: 2022    Subjective:    No acute events overnight  Pain controlled  Tolerating clear liquids without nausea  Passing stool    Postoperative Day #4    Vital Signs:  Patient Vitals for the past 24 hrs:   BP Temp Temp src Pulse Resp SpO2 Weight   22 0945 132/69 97.6 °F (36.4 °C) Oral 72 16 96 % --   22 0830 (!) 149/81 -- -- -- -- -- --   22 0502 -- -- -- -- -- -- 127 lb 3.2 oz (57.7 kg)   22 0400 (!) 157/83 98 °F (36.7 °C) Oral 65 18 97 % --   22 0353 -- -- -- -- 18 -- --   22 0000 (!) 150/63 98 °F (36.7 °C) Oral 60 18 94 % --   22 1945 (!) 147/73 97.5 °F (36.4 °C) Oral 63 16 96 % --   22 1943 -- -- -- 50 16 -- --   22 1651 (!) 151/70 98 °F (36.7 °C) Oral 63 14 97 % --   22 1638 -- -- -- -- -- 97 % --   22 1621 -- -- -- -- 14 -- --      TEMPERATURE HISTORY 24H: Temp (24hrs), Av.8 °F (36.6 °C), Min:97.5 °F (36.4 °C), Max:98 °F (36.7 °C)    BLOOD PRESSURE HISTORY: Systolic (38RYU), UJP:770 , Min:132 , ROU:483    Diastolic (64TCE), IKS:09, Min:63, Max:86      Intake/Output:    I/O last 3 completed shifts:  In: -   Out: 722 [Urine:700; Drains:22]  No intake/output data recorded.   Drain/tube Output:    Closed/Suction Drain Superior Abdomen Bulb-Output (ml): 22 ml  Negative Pressure Wound Therapy Abdomen Medial-Output (ml): 0 ml      Physical Exam:  General: awake, alert, no acute distress, confused at baseline  Abdomen: soft, non-distended, appropriate incisional tenderness to palpation, incision clean dry and intact, JUNIOR serosanguineous    Labs:  CBC:    Recent Labs     22  0638 22  0453 22  0519   WBC 10.2 8.5 9.7   HGB 9.6* 10.4* 10.2*   HCT 28.2* 31.0* 29.6*    250 282     BMP:    Recent Labs     22  0638 223 22  0519   * 140 138   K 3.7 3.1* Shows a note of waiting for payer response.  Is this being addressed by the PA team?   3.1*   * 105 104   CO2 26 25 27   BUN 19 14 11   CREATININE 0.5* <0.5* <0.5*   GLUCOSE 130* 91 92     Hepatic:   No results for input(s): AST, ALT, ALB, BILITOT, ALKPHOS in the last 72 hours. Amylase: No results for input(s): AMYLASE in the last 72 hours. Lipase:   No results for input(s): LIPASE in the last 72 hours. Mag:      Recent Labs     11/21/22  0638 11/22/22  0453   MG 2.10 1.90      Phos:     Recent Labs     11/21/22  0638 11/22/22  0453   PHOS 2.1* 4.0      Coags:   No results for input(s): INR, APTT in the last 72 hours. Cultures:  Relevant cultures documented under results section     Pathology:   Pathology results pending     Imaging:  I have personally reviewed the following films:    XR ABDOMEN (2 VIEWS)    Result Date: 11/18/2022  EXAMINATION: TWO XRAY VIEWS OF THE ABDOMEN 11/18/2022 10:45 am COMPARISON: None. HISTORY: ORDERING SYSTEM PROVIDED HISTORY: F/u SBO TECHNOLOGIST PROVIDED HISTORY: Reason for exam:->F/u SBO Reason for Exam: abdominal pain FINDINGS: This study is done at 48 hours after initiation of the small bowel opacification procedure peer there is an NG tube looped/coiled in the proximal stomach and the tip of this 2 is in the mid body of the stomach. The stomach is nondistended and contains no positive contrast material. There is water-soluble contrast in the slightly dilated proximal small bowel. The maximal opacified small bowel diameter is 3.8 cm. There is no opacification of the intestinal tract distal to the middle 3rd of the small bowel. There is no large bowel dilatation. Mechanical obstruction at the distal 3rd of the small bowel. Concomitant small bowel ileus. RECOMMENDATION: An additional delay radiograph of lower abdomen and pelvis at 24 hours from this exam 2 determine the exact level in the distal 3rd of the small bowel where there is an obstruction.       Scheduled Meds:   potassium bicarbonate  50 mEq Oral Daily    acetaminophen  1,000 mg Oral 3 times per day    sodium chloride flush  5-40 mL IntraVENous 2 times per day    enoxaparin  40 mg SubCUTAneous Daily    pantoprazole  40 mg IntraVENous Daily    piperacillin-tazobactam  3,375 mg IntraVENous Q8H    sodium chloride flush  5-40 mL IntraVENous 2 times per day     Continuous Infusions:   dextrose 50 mL/hr at 11/22/22 1043    sodium chloride 5 mL/hr at 11/21/22 0110     PRN Meds:.potassium chloride **OR** potassium alternative oral replacement **OR** potassium chloride, acetaminophen, sodium chloride flush, sodium chloride, ondansetron **OR** ondansetron, phenol, morphine **OR** morphine, oxyCODONE **OR** oxyCODONE, diazePAM, sodium chloride flush, [DISCONTINUED] acetaminophen **OR** acetaminophen, [DISCONTINUED] morphine **OR** morphine      Assessment:  Patient Active Problem List   Diagnosis    Age-related osteoporosis without current pathological fracture    White coat syndrome without hypertension    SBO (small bowel obstruction) ([facility]     Ms. Kortney Estrella is a 79 y.o. female who presents with   OR Date 11/19/2022, exploratory laparotomy, lysis of adhesions, small bowel and cecum resection with anastomosis for small bowel obstruction    Plan:  1. Pain controlled, minimize narcotics  2. Monitor bowel function, passing stool  3. Tolerating clear liquids, advance to full liquids  4. Monitor and replace electrolytes as needed  5. Increase activity as able, PT/OT  6. Antibiotics, can stop at discharge  7. Defer management of remainder of medical comorbidities to primary and consulting teams  8. Discharge planning, anticipate ready from surgical standpoint in 1-3 days    Ki Owen MD, FACS  11/23/2022  11:02 AM

## 2024-04-01 RX ORDER — POTASSIUM CHLORIDE 750 MG/1
10 TABLET, EXTENDED RELEASE ORAL DAILY
Qty: 90 TABLET | Refills: 1 | Status: SHIPPED | OUTPATIENT
Start: 2024-04-01

## 2024-04-03 ENCOUNTER — OFFICE VISIT (OUTPATIENT)
Dept: FAMILY MEDICINE CLINIC | Age: 84
End: 2024-04-03

## 2024-04-03 VITALS
OXYGEN SATURATION: 94 % | SYSTOLIC BLOOD PRESSURE: 128 MMHG | DIASTOLIC BLOOD PRESSURE: 64 MMHG | HEART RATE: 71 BPM | BODY MASS INDEX: 20.73 KG/M2 | WEIGHT: 117 LBS

## 2024-04-03 DIAGNOSIS — Z12.31 BREAST CANCER SCREENING BY MAMMOGRAM: ICD-10-CM

## 2024-04-03 DIAGNOSIS — Z00.00 MEDICARE ANNUAL WELLNESS VISIT, SUBSEQUENT: Primary | ICD-10-CM

## 2024-04-03 DIAGNOSIS — M81.0 AGE-RELATED OSTEOPOROSIS WITHOUT CURRENT PATHOLOGICAL FRACTURE: ICD-10-CM

## 2024-04-03 DIAGNOSIS — R03.0 WHITE COAT SYNDROME WITHOUT HYPERTENSION: ICD-10-CM

## 2024-04-03 SDOH — ECONOMIC STABILITY: FOOD INSECURITY: WITHIN THE PAST 12 MONTHS, YOU WORRIED THAT YOUR FOOD WOULD RUN OUT BEFORE YOU GOT MONEY TO BUY MORE.: NEVER TRUE

## 2024-04-03 SDOH — ECONOMIC STABILITY: FOOD INSECURITY: WITHIN THE PAST 12 MONTHS, THE FOOD YOU BOUGHT JUST DIDN'T LAST AND YOU DIDN'T HAVE MONEY TO GET MORE.: NEVER TRUE

## 2024-04-03 SDOH — ECONOMIC STABILITY: INCOME INSECURITY: HOW HARD IS IT FOR YOU TO PAY FOR THE VERY BASICS LIKE FOOD, HOUSING, MEDICAL CARE, AND HEATING?: NOT HARD AT ALL

## 2024-04-03 ASSESSMENT — PATIENT HEALTH QUESTIONNAIRE - PHQ9
SUM OF ALL RESPONSES TO PHQ9 QUESTIONS 1 & 2: 0
SUM OF ALL RESPONSES TO PHQ QUESTIONS 1-9: 0
SUM OF ALL RESPONSES TO PHQ QUESTIONS 1-9: 0
1. LITTLE INTEREST OR PLEASURE IN DOING THINGS: NOT AT ALL
SUM OF ALL RESPONSES TO PHQ QUESTIONS 1-9: 0
SUM OF ALL RESPONSES TO PHQ QUESTIONS 1-9: 0
2. FEELING DOWN, DEPRESSED OR HOPELESS: NOT AT ALL

## 2024-04-03 ASSESSMENT — LIFESTYLE VARIABLES: HOW OFTEN DO YOU HAVE A DRINK CONTAINING ALCOHOL: NEVER

## 2024-04-03 NOTE — PROGRESS NOTES
Medicare Annual Wellness Visit    Hayley Celis is here for Medicare AWV    Assessment & Plan   Medicare annual wellness visit, subsequent  Healthy diet, stay active  Fasting labs in the future  Mammogram and DEXA scan in the future  Recommend following up every 6 months with [facility] yearly  Immunizations up-to-date    Age-related osteoporosis without current pathological fracture  Tolerating Fosamax, continue  Check labs and repeat DEXA in the future  -     CBC with Auto Differential; Future  -     Comprehensive Metabolic Panel; Future  -     Hemoglobin A1C; Future  -     TSH with Reflex; Future  -     Vitamin B12 & Folate; Future  -     Vitamin D 25 Hydroxy; Future  -     DEXA BONE DENSITY AXIAL SKELETON; Future    White coat syndrome without hypertension  Blood pressure stable here and at home on no meds  -     Comprehensive Metabolic Panel; Future    Breast cancer screening by mammogram  Screen in the future  -     DEEPAK DIGITAL SCREEN W OR WO CAD BILATERAL; Future  Recommendations for Preventive Services Due: see orders and patient instructions/AVS.  Recommended screening schedule for the next 5-10 years is provided to the patient in written form: see Patient Instructions/AVS.     No follow-ups on file.     Subjective   The following acute and/or chronic problems were also addressed today:  No concerns, feeling well  Meds and supplements reviewed in detail    Patient's complete Health Risk Assessment and screening values have been reviewed and are found in Flowsheets. The following problems were reviewed today and where indicated follow up appointments were made and/or referrals ordered.    Positive Risk Factor Screenings with Interventions:       Cognitive:   Clock Drawing Test (CDT): Normal  Words recalled: 0 Words Recalled  Total Score: (!) 2  Total Score Interpretation: Abnormal Mini-Cog  Interventions:  Patient declines any further evaluation or treatment             Dentist Screen:  Have you seen the

## 2024-04-09 ENCOUNTER — TELEPHONE (OUTPATIENT)
Dept: FAMILY MEDICINE CLINIC | Age: 84
End: 2024-04-09

## 2024-04-09 DIAGNOSIS — R03.0 WHITE COAT SYNDROME WITHOUT HYPERTENSION: ICD-10-CM

## 2024-04-09 DIAGNOSIS — M81.0 AGE-RELATED OSTEOPOROSIS WITHOUT CURRENT PATHOLOGICAL FRACTURE: ICD-10-CM

## 2024-04-09 LAB
25(OH)D3 SERPL-MCNC: 29.3 NG/ML
BASOPHILS # BLD: 0.1 K/UL (ref 0–0.2)
BASOPHILS NFR BLD: 0.9 %
DEPRECATED RDW RBC AUTO: 13 % (ref 12.4–15.4)
EOSINOPHIL # BLD: 0.2 K/UL (ref 0–0.6)
EOSINOPHIL NFR BLD: 2.4 %
FOLATE SERPL-MCNC: >20 NG/ML (ref 4.78–24.2)
HCT VFR BLD AUTO: 35.2 % (ref 36–48)
HGB BLD-MCNC: 12.2 G/DL (ref 12–16)
LYMPHOCYTES # BLD: 2 K/UL (ref 1–5.1)
LYMPHOCYTES NFR BLD: 31.1 %
MCH RBC QN AUTO: 32.8 PG (ref 26–34)
MCHC RBC AUTO-ENTMCNC: 34.7 G/DL (ref 31–36)
MCV RBC AUTO: 94.5 FL (ref 80–100)
MONOCYTES # BLD: 0.4 K/UL (ref 0–1.3)
MONOCYTES NFR BLD: 6.7 %
NEUTROPHILS # BLD: 3.7 K/UL (ref 1.7–7.7)
NEUTROPHILS NFR BLD: 58.9 %
PLATELET # BLD AUTO: 240 K/UL (ref 135–450)
PMV BLD AUTO: 7.9 FL (ref 5–10.5)
RBC # BLD AUTO: 3.73 M/UL (ref 4–5.2)
VIT B12 SERPL-MCNC: 412 PG/ML (ref 211–911)
WBC # BLD AUTO: 6.3 K/UL (ref 4–11)

## 2024-04-09 NOTE — TELEPHONE ENCOUNTER
Patient calling for the results of her blood work from 4/3/2024. As of today the final results of the blood work has not been updated.  Patient plan to call back for the results

## 2024-04-10 ENCOUNTER — TELEPHONE (OUTPATIENT)
Dept: FAMILY MEDICINE CLINIC | Age: 84
End: 2024-04-10

## 2024-04-10 LAB
ALBUMIN SERPL-MCNC: 4.5 G/DL (ref 3.4–5)
ALBUMIN/GLOB SERPL: 1.9 {RATIO} (ref 1.1–2.2)
ALP SERPL-CCNC: 96 U/L (ref 40–129)
ALT SERPL-CCNC: 20 U/L (ref 10–40)
ANION GAP SERPL CALCULATED.3IONS-SCNC: 12 MMOL/L (ref 3–16)
AST SERPL-CCNC: 27 U/L (ref 15–37)
BILIRUB SERPL-MCNC: 0.3 MG/DL (ref 0–1)
BUN SERPL-MCNC: 17 MG/DL (ref 7–20)
CALCIUM SERPL-MCNC: 9.3 MG/DL (ref 8.3–10.6)
CHLORIDE SERPL-SCNC: 102 MMOL/L (ref 99–110)
CO2 SERPL-SCNC: 26 MMOL/L (ref 21–32)
CREAT SERPL-MCNC: 0.6 MG/DL (ref 0.6–1.2)
EST. AVERAGE GLUCOSE BLD GHB EST-MCNC: 96.8 MG/DL
GFR SERPLBLD CREATININE-BSD FMLA CKD-EPI: 88 ML/MIN/{1.73_M2}
GLUCOSE SERPL-MCNC: 103 MG/DL (ref 70–99)
HBA1C MFR BLD: 5 %
POTASSIUM SERPL-SCNC: 4.2 MMOL/L (ref 3.5–5.1)
PROT SERPL-MCNC: 6.9 G/DL (ref 6.4–8.2)
SODIUM SERPL-SCNC: 140 MMOL/L (ref 136–145)
TSH SERPL DL<=0.005 MIU/L-ACNC: 2.01 UIU/ML (ref 0.27–4.2)

## 2024-04-10 NOTE — TELEPHONE ENCOUNTER
Patient's  called and forgot to ask at appt, if the patient suppose to be taking any medicine to help with her memory loss OTC?

## 2024-08-21 ENCOUNTER — OFFICE VISIT (OUTPATIENT)
Dept: FAMILY MEDICINE CLINIC | Age: 84
End: 2024-08-21

## 2024-08-21 VITALS
OXYGEN SATURATION: 96 % | RESPIRATION RATE: 18 BRPM | DIASTOLIC BLOOD PRESSURE: 66 MMHG | HEIGHT: 63 IN | HEART RATE: 71 BPM | BODY MASS INDEX: 21.26 KG/M2 | WEIGHT: 120 LBS | SYSTOLIC BLOOD PRESSURE: 139 MMHG

## 2024-08-21 DIAGNOSIS — R03.0 WHITE COAT SYNDROME WITHOUT HYPERTENSION: Primary | ICD-10-CM

## 2024-08-21 DIAGNOSIS — D50.8 OTHER IRON DEFICIENCY ANEMIA: ICD-10-CM

## 2024-08-21 DIAGNOSIS — E55.9 VITAMIN D DEFICIENCY: ICD-10-CM

## 2024-08-21 DIAGNOSIS — R03.0 WHITE COAT SYNDROME WITHOUT HYPERTENSION: ICD-10-CM

## 2024-08-21 DIAGNOSIS — M81.0 AGE-RELATED OSTEOPOROSIS WITHOUT CURRENT PATHOLOGICAL FRACTURE: ICD-10-CM

## 2024-08-21 LAB
25(OH)D3 SERPL-MCNC: 30.2 NG/ML
ALBUMIN SERPL-MCNC: 4.1 G/DL (ref 3.4–5)
ALBUMIN/GLOB SERPL: 1.6 {RATIO} (ref 1.1–2.2)
ALP SERPL-CCNC: 88 U/L (ref 40–129)
ALT SERPL-CCNC: 21 U/L (ref 10–40)
ANION GAP SERPL CALCULATED.3IONS-SCNC: 9 MMOL/L (ref 3–16)
AST SERPL-CCNC: 28 U/L (ref 15–37)
BASOPHILS # BLD: 0.1 K/UL (ref 0–0.2)
BASOPHILS NFR BLD: 0.8 %
BILIRUB SERPL-MCNC: 0.3 MG/DL (ref 0–1)
BUN SERPL-MCNC: 14 MG/DL (ref 7–20)
CALCIUM SERPL-MCNC: 9 MG/DL (ref 8.3–10.6)
CHLORIDE SERPL-SCNC: 99 MMOL/L (ref 99–110)
CO2 SERPL-SCNC: 26 MMOL/L (ref 21–32)
CREAT SERPL-MCNC: 0.6 MG/DL (ref 0.6–1.2)
DEPRECATED RDW RBC AUTO: 13.3 % (ref 12.4–15.4)
EOSINOPHIL # BLD: 0.2 K/UL (ref 0–0.6)
EOSINOPHIL NFR BLD: 3.5 %
GFR SERPLBLD CREATININE-BSD FMLA CKD-EPI: 88 ML/MIN/{1.73_M2}
GLUCOSE SERPL-MCNC: 85 MG/DL (ref 70–99)
HCT VFR BLD AUTO: 35.6 % (ref 36–48)
HGB BLD-MCNC: 11.9 G/DL (ref 12–16)
LYMPHOCYTES # BLD: 2.2 K/UL (ref 1–5.1)
LYMPHOCYTES NFR BLD: 34.5 %
MCH RBC QN AUTO: 32.1 PG (ref 26–34)
MCHC RBC AUTO-ENTMCNC: 33.4 G/DL (ref 31–36)
MCV RBC AUTO: 96 FL (ref 80–100)
MONOCYTES # BLD: 0.4 K/UL (ref 0–1.3)
MONOCYTES NFR BLD: 6.9 %
NEUTROPHILS # BLD: 3.4 K/UL (ref 1.7–7.7)
NEUTROPHILS NFR BLD: 54.3 %
PLATELET # BLD AUTO: 236 K/UL (ref 135–450)
PMV BLD AUTO: 7.7 FL (ref 5–10.5)
POTASSIUM SERPL-SCNC: 4.4 MMOL/L (ref 3.5–5.1)
PROT SERPL-MCNC: 6.6 G/DL (ref 6.4–8.2)
RBC # BLD AUTO: 3.71 M/UL (ref 4–5.2)
SODIUM SERPL-SCNC: 134 MMOL/L (ref 136–145)
WBC # BLD AUTO: 6.3 K/UL (ref 4–11)

## 2024-08-21 NOTE — PROGRESS NOTES
Hayley Celis is a 84 y.o. female.    HPI  Accompanied by   Overall doing well, no specific complaints  Here for med check and lab check  Labs April 2024 reviewed with patient and  in detail  Lowish vitamin D    Recommend flu vaccine end of September early October      Meds, vitamins and allergies reviewed with patient in detail      Wt Readings from Last 3 Encounters:   08/21/24 54.4 kg (120 lb)   04/03/24 53.1 kg (117 lb)   11/08/23 52.1 kg (114 lb 12.8 oz)       REVIEW OF SYSTEMS:   CONSTITUTIONAL: See history of present illness,   Weight noted/improved  HEENT: No new vision difficulties or ringing in the ears.   RESPIRATORY: No new SOB, PND, orthopnea or cough.   CARDIOVASCULAR: no CP, palpitations or SOB with exertion  GI: No nausea, vomiting, diarrhea, constipation, abdominal pain or changes in bowel habits.   : No urinary frequency, urgency, incontinence hematuria or dysuria.   SKIN: No cyanosis or skin lesions.   MUSCULOSKELETAL: No new muscle or joint pain.   NEUROLOGICAL: No syncope or TIA-like symptoms.   PSYCHIATRIC: No anxiety, insomnia or depression     No Known Allergies    Prior to Visit Medications    Medication Sig Taking? Authorizing Provider   KLOR-CON M10 10 MEQ extended release tablet TAKE 1 TABLET BY MOUTH EVERY DAY Yes Maye Noyola MD   Pediatric Multiple Vitamins (FLINSTONES GUMMIES OMEGA-3 DHA) CHEW Take by mouth Chewable with iron Yes Koko Brownlee MD   alendronate (FOSAMAX) 70 MG tablet TAKE 1 TABLET BY MOUTH ONE TIME PER WEEK Yes Maye Noyola MD   carbamide peroxide (DEBROX) 6.5 % otic solution Place 3 drops into both ears 2 times daily Yes Maye Noyola MD   calcium citrate (CALCITRATE) 250 MG TABS tablet 2 tabs po daily Yes Maye Noyola MD   Cholecalciferol (VITAMIN D3 PO) Take 1,000 Units by mouth daily Yes Koko Brownlee MD       Past Medical History:   Diagnosis Date    Age-related osteoporosis without current pathological fracture

## 2024-09-23 RX ORDER — POTASSIUM CHLORIDE 750 MG/1
10 TABLET, EXTENDED RELEASE ORAL DAILY
Qty: 90 TABLET | Refills: 1 | Status: SHIPPED | OUTPATIENT
Start: 2024-09-23

## 2024-09-24 RX ORDER — ALENDRONATE SODIUM 70 MG/1
TABLET ORAL
Qty: 12 TABLET | Refills: 3 | Status: SHIPPED | OUTPATIENT
Start: 2024-09-24

## 2024-11-03 NOTE — PROGRESS NOTES
Hayley Celis is a 84 y.o. female.    HPI:  Here with   Complaining of some right ear discomfort, itches  Concerned about wax    Meds, vitamins and allergies reviewed with pt    Weight is stable    Wt Readings from Last 3 Encounters:   11/04/24 54.4 kg (120 lb)   08/21/24 54.4 kg (120 lb)   04/03/24 53.1 kg (117 lb)       REVIEW OF SYSTEMS:   CONSTITUTIONAL: See history of present illness,   Weight noted   HEENT: No new vision difficulties or ringing in the ears.   RESPIRATORY: No new SOB, PND, orthopnea or cough.   CARDIOVASCULAR: no CP, palpitations or SOB with exertion  GI: No nausea, vomiting, diarrhea, constipation, abdominal pain or changes in bowel habits.   : No urinary frequency, urgency, incontinence hematuria or dysuria.   SKIN: No cyanosis or skin lesions.   MUSCULOSKELETAL: No new muscle or joint pain.   NEUROLOGICAL: No syncope or TIA-like symptoms.   PSYCHIATRIC: No anxiety, insomnia or depression     No Known Allergies    Prior to Visit Medications    Medication Sig Taking? Authorizing Provider   hydrocortisone 2.5 % cream Apply topically 2 times daily right ear canal for 5 days Yes Maye Noyola MD   carbamide peroxide (DEBROX) 6.5 % otic solution Place 3 drops into both ears 2 times daily Yes Maye Noyola MD   alendronate (FOSAMAX) 70 MG tablet TAKE 1 TABLET BY MOUTH ONE TIME PER WEEK Yes Maye Noyola MD   KLOR-CON M10 10 MEQ extended release tablet TAKE 1 TABLET BY MOUTH EVERY DAY Yes Maye Noyola MD   Pediatric Multiple Vitamins (FLINSTONES GUMMIES OMEGA-3 DHA) CHEW Take by mouth Chewable with iron Yes Koko Brownlee MD   calcium citrate (CALCITRATE) 250 MG TABS tablet 2 tabs po daily Yes Maye Noyola MD   Cholecalciferol (VITAMIN D3 PO) Take 1,000 Units by mouth daily Yes Koko Brownlee MD       Past Medical History:   Diagnosis Date    Age-related osteoporosis without current pathological fracture 11/05/2018    Arthritis     Osteoporosis        Social

## 2024-11-04 ENCOUNTER — OFFICE VISIT (OUTPATIENT)
Dept: FAMILY MEDICINE CLINIC | Age: 84
End: 2024-11-04

## 2024-11-04 VITALS
SYSTOLIC BLOOD PRESSURE: 119 MMHG | HEART RATE: 70 BPM | BODY MASS INDEX: 21.26 KG/M2 | WEIGHT: 120 LBS | OXYGEN SATURATION: 98 % | DIASTOLIC BLOOD PRESSURE: 70 MMHG

## 2024-11-04 DIAGNOSIS — H61.22 IMPACTED CERUMEN OF LEFT EAR: Primary | ICD-10-CM

## 2024-11-04 RX ORDER — HYDROCORTISONE 25 MG/G
CREAM TOPICAL
Qty: 28 G | Refills: 1 | Status: SHIPPED | OUTPATIENT
Start: 2024-11-04

## 2025-01-07 NOTE — ADDENDUM NOTE
Addended by: Eugene David on: 11/12/2021 03:30 PM     Modules accepted: Orders Please see the attached refill request.

## 2025-03-14 ENCOUNTER — TELEPHONE (OUTPATIENT)
Dept: FAMILY MEDICINE CLINIC | Age: 85
End: 2025-03-14

## 2025-03-14 NOTE — TELEPHONE ENCOUNTER
Patient daughter called and she is on HIPAA     Daughter   number is [phone] lives out of state and would like to see if patient  is on any kind of memory care medication     If she has been sent to neurology for evaluation.     Daughter feels that patient needs help in the home and needs to see how to get services for her and her dad.     Sheree would like to have a call back and advise [phone]

## 2025-03-18 RX ORDER — POTASSIUM CHLORIDE 750 MG/1
10 TABLET, EXTENDED RELEASE ORAL DAILY
Qty: 90 TABLET | Refills: 1 | Status: SHIPPED | OUTPATIENT
Start: 2025-03-18

## 2025-03-18 NOTE — TELEPHONE ENCOUNTER
Called daughter and she is out of town, but would like to be called during mothers appointment.     Called patient and scheduled her medicare annual visit.

## 2025-04-02 ENCOUNTER — OFFICE VISIT (OUTPATIENT)
Dept: FAMILY MEDICINE CLINIC | Age: 85
End: 2025-04-02

## 2025-04-02 VITALS
WEIGHT: 117 LBS | SYSTOLIC BLOOD PRESSURE: 126 MMHG | HEART RATE: 79 BPM | DIASTOLIC BLOOD PRESSURE: 72 MMHG | OXYGEN SATURATION: 99 % | BODY MASS INDEX: 20.73 KG/M2

## 2025-04-02 DIAGNOSIS — M81.0 AGE-RELATED OSTEOPOROSIS WITHOUT CURRENT PATHOLOGICAL FRACTURE: ICD-10-CM

## 2025-04-02 DIAGNOSIS — R03.0 WHITE COAT SYNDROME WITHOUT HYPERTENSION: ICD-10-CM

## 2025-04-02 DIAGNOSIS — S39.012A STRAIN OF LUMBAR REGION, INITIAL ENCOUNTER: ICD-10-CM

## 2025-04-02 DIAGNOSIS — R41.3 MEMORY DEFICIT: ICD-10-CM

## 2025-04-02 DIAGNOSIS — Z00.00 MEDICARE ANNUAL WELLNESS VISIT, SUBSEQUENT: Primary | ICD-10-CM

## 2025-04-02 PROCEDURE — G0439 PPPS, SUBSEQ VISIT: HCPCS | Performed by: FAMILY MEDICINE

## 2025-04-02 PROCEDURE — 99213 OFFICE O/P EST LOW 20 MIN: CPT | Performed by: FAMILY MEDICINE

## 2025-04-02 PROCEDURE — 1123F ACP DISCUSS/DSCN MKR DOCD: CPT | Performed by: FAMILY MEDICINE

## 2025-04-02 SDOH — ECONOMIC STABILITY: FOOD INSECURITY: WITHIN THE PAST 12 MONTHS, THE FOOD YOU BOUGHT JUST DIDN'T LAST AND YOU DIDN'T HAVE MONEY TO GET MORE.: NEVER TRUE

## 2025-04-02 SDOH — ECONOMIC STABILITY: FOOD INSECURITY: WITHIN THE PAST 12 MONTHS, YOU WORRIED THAT YOUR FOOD WOULD RUN OUT BEFORE YOU GOT MONEY TO BUY MORE.: NEVER TRUE

## 2025-04-02 ASSESSMENT — PATIENT HEALTH QUESTIONNAIRE - PHQ9
SUM OF ALL RESPONSES TO PHQ QUESTIONS 1-9: 0
SUM OF ALL RESPONSES TO PHQ QUESTIONS 1-9: 0
2. FEELING DOWN, DEPRESSED OR HOPELESS: NOT AT ALL
SUM OF ALL RESPONSES TO PHQ QUESTIONS 1-9: 0
SUM OF ALL RESPONSES TO PHQ QUESTIONS 1-9: 0
1. LITTLE INTEREST OR PLEASURE IN DOING THINGS: NOT AT ALL

## 2025-04-02 ASSESSMENT — LIFESTYLE VARIABLES: HOW OFTEN DO YOU HAVE A DRINK CONTAINING ALCOHOL: NEVER

## 2025-04-02 NOTE — PROGRESS NOTES
Medicare Annual Wellness Visit    Hayley Celis is here for Medicare AWV  Accompanied by , Jay Celis  He is becoming more exhausted caring for his wife with worsening dementia  Recommend repeat blood work, MRI of brain  Offer to have her care coordinator reach out to him for support at home  They wish to try to stay in their home for now    Assessment & Plan   Medicare annual wellness visit, subsequent  Healthy diet, stay active    White coat syndrome without hypertension  Blood pressure is stable on no medication, continue to monitor  -     Comprehensive Metabolic Panel, Fasting; Future    Age-related osteoporosis without current pathological fracture  Continue Fosamax weekly  -     Comprehensive Metabolic Panel, Fasting; Future    Memory deficit  Worsening short-term memory,  becoming more exhausted and requesting help  Make him aware of [facility]    Screen with labs and MRI  -     CBC with Auto Differential; Future  -     Lipid, Fasting; Future  -     Hemoglobin A1C; Future  -     Vitamin B12 & Folate; Future  -     Vitamin D 25 Hydroxy; Future  -     TSH reflex to FT4; Future  -     MRI BRAIN WO CONTRAST; Future    Reach out to Jordyn Leon to contact , Jay, for any support at home    Strain of lumbar region, initial encounter  Refer to physical therapy  -     [facility]       No follow-ups on file.     Subjective   The following acute and/or chronic problems were also addressed today:  Worsening memory   Diffuse back pain upper and lower, old mattress  , Recommend physical therapy, agreeable    Patient's complete Health Risk Assessment and screening values have been reviewed and are found in Flowsheets. The following problems were reviewed today and where indicated follow up appointments were made and/or referrals ordered.    Positive Risk Factor Screenings with Interventions:     Cognitive:   Clock Drawing Test (CDT): Normal  Words recalled: 0 Words

## 2025-04-03 ENCOUNTER — CARE COORDINATION (OUTPATIENT)
Dept: CARE COORDINATION | Age: 85
End: 2025-04-03

## 2025-04-03 SDOH — ECONOMIC STABILITY: FOOD INSECURITY: WITHIN THE PAST 12 MONTHS, THE FOOD YOU BOUGHT JUST DIDN'T LAST AND YOU DIDN'T HAVE MONEY TO GET MORE.: NEVER TRUE

## 2025-04-03 SDOH — ECONOMIC STABILITY: FOOD INSECURITY: WITHIN THE PAST 12 MONTHS, YOU WORRIED THAT YOUR FOOD WOULD RUN OUT BEFORE YOU GOT MONEY TO BUY MORE.: NEVER TRUE

## 2025-04-03 SDOH — HEALTH STABILITY: PHYSICAL HEALTH: ON AVERAGE, HOW MANY DAYS PER WEEK DO YOU ENGAGE IN MODERATE TO STRENUOUS EXERCISE (LIKE A BRISK WALK)?: 7 DAYS

## 2025-04-03 SDOH — HEALTH STABILITY: PHYSICAL HEALTH: ON AVERAGE, HOW MANY MINUTES DO YOU ENGAGE IN EXERCISE AT THIS LEVEL?: 20 MIN

## 2025-04-03 SDOH — ECONOMIC STABILITY: TRANSPORTATION INSECURITY
IN THE PAST 12 MONTHS, HAS THE LACK OF TRANSPORTATION KEPT YOU FROM MEDICAL APPOINTMENTS OR FROM GETTING MEDICATIONS?: NO

## 2025-04-03 SDOH — ECONOMIC STABILITY: INCOME INSECURITY: IN THE LAST 12 MONTHS, WAS THERE A TIME WHEN YOU WERE NOT ABLE TO PAY THE MORTGAGE OR RENT ON TIME?: NO

## 2025-04-03 SDOH — ECONOMIC STABILITY: TRANSPORTATION INSECURITY
IN THE PAST 12 MONTHS, HAS LACK OF TRANSPORTATION KEPT YOU FROM MEETINGS, WORK, OR FROM GETTING THINGS NEEDED FOR DAILY LIVING?: NO

## 2025-04-03 ASSESSMENT — SOCIAL DETERMINANTS OF HEALTH (SDOH): HOW HARD IS IT FOR YOU TO PAY FOR THE VERY BASICS LIKE FOOD, HOUSING, MEDICAL CARE, AND HEATING?: NOT HARD AT ALL

## 2025-04-03 NOTE — CARE COORDINATION
Ambulatory Care Coordination Note     4/3/2025 3:53 PM     Patient Current Location:  Home: [address] [address]     This patient was received as a referral from Provider.    ACM contacted the spouse/partner by telephone. Verified name and  with spouse/partner as identifiers. Provided introduction to self, and explanation of the ACM role.   Spouse/Partner accepted care management services at this time.          ACM: Jordyn Leon RN     Challenges to be reviewed by the provider   Additional needs identified to be addressed with provider No  none               Method of communication with provider: chart routing.    Utilization: Initial Call - N/A    Care Summary Note:     ACM made care coordination outreach and spoke with patient's , Jay.   very pleasant on the phone while conversing with ACM. Informed of the role of care coordination and ACM and agreed to future follow up calls. Stated that he is caring for his wife with dementia. Expressed caregiver strain. Stated that his wife has a daughter who [place] and is only able to help over the phone. Has a neighbor next door who brings them food and stayed with patient while Jay went to the ED for his elevated blood pressure.   Expressed issue with [employer] Billing. Stated that her insurance declines all claims due to name discrepancy. Her name on the drivers license and insurance card are different. Name listed on drivers license is Hayley Celis. Name on Insurance Card is JUDAH Celis.  reports that patient's legal name is SWEETIE. Hayley Celis Stated that he has been unable to get this cleared for years. Stated that he has called the hospital and insurance and never gets an answer. ACM to reach out to [facility] to discuss.   Stated that he reached out to [facility] several years ago. Was offered assistance with light housekeeping and an aide. Does not feel comfortable leaving his wife with an aide. Able to do light housework himself and

## 2025-04-08 ENCOUNTER — TELEPHONE (OUTPATIENT)
Dept: FAMILY MEDICINE CLINIC | Age: 85
End: 2025-04-08

## 2025-04-08 NOTE — TELEPHONE ENCOUNTER
Patient daughter called and has some concerns about patient last office visit and who the  was that came with the patient.     She is asking why MRI was ordered   and why was she sent to  neurology before MRI     Patient daughter is on HIPAA but doesn't live in the state.   She said patients  is very closed lipped about everything,     Patient daughter wants more clarification and also how the  got involved.     Please advise   Luz [phone]

## 2025-04-11 DIAGNOSIS — R41.3 MEMORY DEFICIT: ICD-10-CM

## 2025-04-11 DIAGNOSIS — M81.0 AGE-RELATED OSTEOPOROSIS WITHOUT CURRENT PATHOLOGICAL FRACTURE: ICD-10-CM

## 2025-04-11 DIAGNOSIS — R03.0 WHITE COAT SYNDROME WITHOUT HYPERTENSION: ICD-10-CM

## 2025-04-11 LAB
25(OH)D3 SERPL-MCNC: 28.1 NG/ML
ALBUMIN SERPL-MCNC: 4.1 G/DL (ref 3.4–5)
ALBUMIN/GLOB SERPL: 1.5 {RATIO} (ref 1.1–2.2)
ALP SERPL-CCNC: 85 U/L (ref 40–129)
ALT SERPL-CCNC: 24 U/L (ref 10–40)
ANION GAP SERPL CALCULATED.3IONS-SCNC: 10 MMOL/L (ref 3–16)
AST SERPL-CCNC: 28 U/L (ref 15–37)
BASOPHILS # BLD: 0 K/UL (ref 0–0.2)
BASOPHILS NFR BLD: 0.7 %
BILIRUB SERPL-MCNC: 0.4 MG/DL (ref 0–1)
BUN SERPL-MCNC: 12 MG/DL (ref 7–20)
CALCIUM SERPL-MCNC: 9.4 MG/DL (ref 8.3–10.6)
CHLORIDE SERPL-SCNC: 100 MMOL/L (ref 99–110)
CHOLEST SERPL-MCNC: 146 MG/DL (ref 0–199)
CO2 SERPL-SCNC: 27 MMOL/L (ref 21–32)
CREAT SERPL-MCNC: 0.6 MG/DL (ref 0.6–1.2)
DEPRECATED RDW RBC AUTO: 13 % (ref 12.4–15.4)
EOSINOPHIL # BLD: 0.2 K/UL (ref 0–0.6)
EOSINOPHIL NFR BLD: 3 %
EST. AVERAGE GLUCOSE BLD GHB EST-MCNC: 96.8 MG/DL
FOLATE SERPL-MCNC: 28.7 NG/ML (ref 4.78–24.2)
GFR SERPLBLD CREATININE-BSD FMLA CKD-EPI: 88 ML/MIN/{1.73_M2}
GLUCOSE P FAST SERPL-MCNC: 82 MG/DL (ref 70–99)
HBA1C MFR BLD: 5 %
HCT VFR BLD AUTO: 35.8 % (ref 36–48)
HDLC SERPL-MCNC: 56 MG/DL (ref 40–60)
HGB BLD-MCNC: 12.1 G/DL (ref 12–16)
LDL CHOLESTEROL: 51 MG/DL
LYMPHOCYTES # BLD: 1.9 K/UL (ref 1–5.1)
LYMPHOCYTES NFR BLD: 28.4 %
MCH RBC QN AUTO: 32.2 PG (ref 26–34)
MCHC RBC AUTO-ENTMCNC: 33.8 G/DL (ref 31–36)
MCV RBC AUTO: 95.4 FL (ref 80–100)
MONOCYTES # BLD: 0.4 K/UL (ref 0–1.3)
MONOCYTES NFR BLD: 6.6 %
NEUTROPHILS # BLD: 4.1 K/UL (ref 1.7–7.7)
NEUTROPHILS NFR BLD: 61.3 %
PLATELET # BLD AUTO: 225 K/UL (ref 135–450)
PMV BLD AUTO: 7.9 FL (ref 5–10.5)
POTASSIUM SERPL-SCNC: 4.2 MMOL/L (ref 3.5–5.1)
PROT SERPL-MCNC: 6.8 G/DL (ref 6.4–8.2)
RBC # BLD AUTO: 3.75 M/UL (ref 4–5.2)
SODIUM SERPL-SCNC: 137 MMOL/L (ref 136–145)
TRIGL SERPL-MCNC: 195 MG/DL (ref 0–150)
TSH SERPL DL<=0.005 MIU/L-ACNC: 2.24 UIU/ML (ref 0.27–4.2)
VIT B12 SERPL-MCNC: 329 PG/ML (ref 211–911)
VLDLC SERPL CALC-MCNC: 39 MG/DL
WBC # BLD AUTO: 6.6 K/UL (ref 4–11)

## 2025-04-18 ENCOUNTER — CARE COORDINATION (OUTPATIENT)
Dept: CARE COORDINATION | Age: 85
End: 2025-04-18

## 2025-04-18 NOTE — CARE COORDINATION
Ambulatory Care Coordination Note     4/18/2025 11:15 AM     Spouse/Partner outreach attempt by this ACM today to perform care management follow up . ACM was unable to reach the spouse/partner  by telephone today;   left voice message requesting a return phone call to this ACM.     ACM: Jordyn Leon RN    PCP/Specialist follow up:       Follow Up:   Plan for next ACM outreach in approximately 2 weeks to complete:  - goal progression  - education   -[facility] referral   -[facility]PP   -AA referral   -mail fall flyers  -review mailings

## (undated) DEVICE — DRAPE,LAP,CHOLE,W/TROUGHS,STERILE: Brand: MEDLINE

## (undated) DEVICE — PAD THRM M SPL TORSO

## (undated) DEVICE — GOWN,AURORA,NONREINF,RAGLAN,XXL,STERILE: Brand: MEDLINE

## (undated) DEVICE — SUTURE VCRL + SZ 3-0 L18IN ABSRB UD SH 1/2 CIR TAPERCUT NDL VCP864D

## (undated) DEVICE — SOLUTION IV IRRIG WATER 1000ML POUR BRL 2F7114

## (undated) DEVICE — 3M™ IOBAN™ 2 ANTIMICROBIAL INCISE DRAPE 6650EZ: Brand: IOBAN™ 2

## (undated) DEVICE — SUTURE PDS + SZ 1 L96IN ABSRB VLT L65MM TP-1 1/2 CIR PDP880G

## (undated) DEVICE — MERCY FAIRFIELD TURNOVER KIT: Brand: MEDLINE INDUSTRIES, INC.

## (undated) DEVICE — ELECTRODE PT RET AD L9FT HI MOIST COND ADH HYDRGEL CORDED

## (undated) DEVICE — SHEET,DRAPE,53X77,STERILE: Brand: MEDLINE

## (undated) DEVICE — RELOAD STPL L60MM H1.5-3.6MM REG TISS BLU GRIPPING SURF B

## (undated) DEVICE — SPONGE LAP W18XL18IN WHT COT 4 PLY FLD STRUNG RADPQ DISP ST

## (undated) DEVICE — TOWEL,OR,DSP,ST,BLUE,DLX,10/PK,8PK/CS: Brand: MEDLINE

## (undated) DEVICE — STAPLER INT L28CM 60MM 12 FIRING B FRM PWD + ECHELON FLX

## (undated) DEVICE — SEALER ENDOSCP NANO COAT OPN DIV CRV L JAW LIGASURE IMPACT

## (undated) DEVICE — MAJOR SET UP PK

## (undated) DEVICE — BLANKET WRM W29.9XL79.1IN UP BODY FORC AIR MISTRAL-AIR

## (undated) DEVICE — SOLUTION IRRIG 1000ML 0.9% SOD CHL USP POUR PLAS BTL

## (undated) DEVICE — SUTURE VCRL + SZ 0 L18IN ABSRB CLR VCP112G

## (undated) DEVICE — TOTAL TRAY, DB, 100% SILI FOLEY, 16FR 10: Brand: MEDLINE

## (undated) DEVICE — GAUZE,SPONGE,4"X4",8PLY,STRL,LF,10/TRAY: Brand: MEDLINE

## (undated) DEVICE — STERILE POLYISOPRENE POWDER-FREE SURGICAL GLOVES WITH EMOLLIENT COATING: Brand: PROTEXIS

## (undated) DEVICE — Device

## (undated) DEVICE — DRAPE THER FLUID WARMING 66X44 IN FLAT SLUSH DBL DISC ORS

## (undated) DEVICE — COVER LT HNDL BLU PLAS

## (undated) DEVICE — DRAIN CHN 19FR L0.25IN DIA6.3MM SIL RND HUBLESS FULL FLUT